# Patient Record
Sex: FEMALE | Race: WHITE | NOT HISPANIC OR LATINO | Employment: FULL TIME | URBAN - METROPOLITAN AREA
[De-identification: names, ages, dates, MRNs, and addresses within clinical notes are randomized per-mention and may not be internally consistent; named-entity substitution may affect disease eponyms.]

---

## 2018-10-09 ENCOUNTER — EVALUATION (OUTPATIENT)
Dept: PHYSICAL THERAPY | Facility: REHABILITATION | Age: 43
End: 2018-10-09
Payer: COMMERCIAL

## 2018-10-09 DIAGNOSIS — M54.12 CERVICAL RADICULOPATHY: Primary | ICD-10-CM

## 2018-10-09 PROCEDURE — 97140 MANUAL THERAPY 1/> REGIONS: CPT | Performed by: PHYSICAL THERAPIST

## 2018-10-09 PROCEDURE — G8985 CARRY GOAL STATUS: HCPCS | Performed by: PHYSICAL THERAPIST

## 2018-10-09 PROCEDURE — 97162 PT EVAL MOD COMPLEX 30 MIN: CPT | Performed by: PHYSICAL THERAPIST

## 2018-10-09 PROCEDURE — G8984 CARRY CURRENT STATUS: HCPCS | Performed by: PHYSICAL THERAPIST

## 2018-10-09 NOTE — LETTER
October 10, 2018    Celestina Trevino MD  [address] [place] [address]    Patient: Mia Barroso   YOB: 1975   Date of Visit: 10/9/2018     Encounter Diagnosis     ICD-10-CM    1  Cervical radiculopathy M54 12        Dear Dr Tam Rush:    Please review the attached Plan of Care from [facility] recent visit  Please verify that you agree therapy should continue by signing the attached document and sending it back to our office  If you have any questions or concerns, please don't hesitate to call  Sincerely,    Leila Main, PT      Referring Provider:      I certify that I have read the below Plan of Care and certify the need for these services furnished under this plan of treatment while under my care  Celestina Trevino MD  [phone]  VIA Facsimile: [phone]          PT Evaluation   Assessment/Plan  Subjective    Today's date: 10/10/2018  Patient name: Mia Barroso  : 1975  MRN: [id number]  Referring provider: Zora Durna MD  Dx:   Encounter Diagnosis   Name Primary? 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  The cart vibrates and was she pushes it  Evaluation findings appear consistent with a nerve impingement but it is not clear if she has a herniated disc or it is related to muscle spasms  She most likely has a double crush process  This would involve a disc, scalenes and some what her pec minor    She has generalized left UE weakness but a specific positive median nerve upper limb tension test   She has weak cervical musculature as well  I will need to address areas above concomitantly to be effective but this will probably cause her some increased discomfort during the treatment process for the first 6 weeks  If she can not tolerated the therapy she may need to get further work up (to confirm HNP) and get more of a team approach with pain management  Objective     Neurological Testing     Sensation   Cervical/Thoracic   Left   Diminished: light touch    Reflexes   Left   Biceps (C5/C6): trace (1+)  Brachioradialis (C6): trace (1+)    Active Range of Motion   Left Shoulder   Flexion: 175 degrees with pain    Passive Range of Motion   Left Shoulder   Flexion: 170 degrees with pain    Strength/Myotome Testing   Cervical Spine     Left   Neck lateral flexion (C3): 4-    Right etr  Neck lateral flexion (C3): 4-    Left Shoulder     Planes of Motion   Flexion: 4   Abduction: 4   External rotation at 0°:  4   Internal rotation at 0°:  4     Isolated Muscles   Lower trapezius: 4+   Middle trapezius: 4   Serratus anterior: 4   Upper trapezius: 4+     Left Elbow   Flexion: 4-  Extension: 4    Left Wrist/Hand   Wrist extension: 4  Wrist flexion: 4-  Ulnar deviation: 4     (2nd hand position)     Trial 1: 28    Right Wrist/Hand      (2nd hand position)     Trial 1: 40    Additional Strength Details  Deep neck flexor strength = 6 seconds (normal = 60 sec)  Very tight scalenes on left  Very tight pec minor bilaterally Left>Right  Very tight suboccipital muscles  Pt  Is right hand dominante    Tests   Cervical     Left   Negative alar ligament integrity  Right   Negative alar ligament integrity  Left Shoulder   Positive ULTT1  Negative crank, Hawkin's and Neer's       Additional Tests Details  (-) VBI    STG:   Pain level 4/10 left UE and neck  Headaches no more than 1x weekly    LTG:  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 Modalities             TENS with MHP?                                          X= performed        Flowsheet Rows      Most Recent Value   PT/OT G-Codes   FOTO information reviewed  No   Assessment Type  Evaluation   G code set  Carrying, Moving & Handling Objects   Carrying, Moving and Handling Objects Current Status ()  CL   Carrying, Moving and Handling Objects Goal Status ()  CHRISTY Ruelas, PT  10/10/2018,7:00 AM

## 2018-10-10 ENCOUNTER — TRANSCRIBE ORDERS (OUTPATIENT)
Dept: PHYSICAL THERAPY | Facility: REHABILITATION | Age: 43
End: 2018-10-10

## 2018-10-10 DIAGNOSIS — M54.12 BRACHIAL NEURITIS: Primary | ICD-10-CM

## 2018-10-10 NOTE — PROGRESS NOTES
PT Evaluation   Assessment/Plan  Subjective    Today's date: 10/10/2018  Patient name: Samira Masters  : 1975  MRN: [id number]  Referring provider: Zully Gutierrez MD  Dx:   Encounter Diagnosis   Name Primary? 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  The cart vibrates and was she pushes it  Evaluation findings appear consistent with a nerve impingement but it is not clear if she has a herniated disc or it is related to muscle spasms  She most likely has a double crush process  This would involve a disc, scalenes and some what her pec minor  She has generalized left UE weakness but a specific positive median nerve upper limb tension test   She has weak cervical musculature as well  I will need to address areas above concomitantly to be effective but this will probably cause her some increased discomfort during the treatment process for the first 6 weeks  If she can not tolerated the therapy she may need to get further work up (to confirm HNP) and get more of a team approach with pain management        Objective     Neurological Testing     Sensation   Cervical/Thoracic   Left   Diminished: light touch    Reflexes   Left   Biceps (C5/C6): trace (1+)  Brachioradialis (C6): trace (1+)    Active Range of Motion   Left Shoulder   Flexion: 175 degrees with pain    Passive Range of Motion   Left Shoulder   Flexion: 170 degrees with pain    Strength/Myotome Testing   Cervical Spine     Left   Neck lateral flexion (C3): 4-    Right etr  Neck lateral flexion (C3): 4-    Left Shoulder     Planes of Motion   Flexion: 4   Abduction: 4   External rotation at 0°: 4   Internal rotation at 0°: 4     Isolated Muscles   Lower trapezius: 4+   Middle trapezius: 4   Serratus anterior: 4   Upper trapezius: 4+     Left Elbow   Flexion: 4-  Extension: 4    Left Wrist/Hand   Wrist extension: 4  Wrist flexion: 4-  Ulnar deviation: 4     (2nd hand position)     Trial 1: 28    Right Wrist/Hand      (2nd hand position)     Trial 1: 40    Additional Strength Details  Deep neck flexor strength = 6 seconds (normal = 60 sec)  Very tight scalenes on left  Very tight pec minor bilaterally Left>Right  Very tight suboccipital muscles  Pt  Is right hand dominante    Tests   Cervical     Left   Negative alar ligament integrity  Right   Negative alar ligament integrity  Left Shoulder   Positive ULTT1  Negative crank, Hawkin's and Neer's  Additional Tests Details  (-) VBI    STG:   Pain level 4/10 left UE and neck  Headaches no more than 1x weekly    LTG:  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 Modalities             TENS with MHP?                                          X= performed        Flowsheet Rows      Most Recent Value   PT/OT G-Codes   FOTO information reviewed  No Assessment Type  Evaluation   G code set  Carrying, Moving & Handling Objects   Carrying, Moving and Handling Objects Current Status ()  CL   Carrying, Moving and Handling Objects Goal Status ()  CHRISTY Bee, PT  10/10/2018,7:00 AM

## 2018-10-16 ENCOUNTER — OFFICE VISIT (OUTPATIENT)
Dept: PHYSICAL THERAPY | Facility: REHABILITATION | Age: 43
End: 2018-10-16
Payer: COMMERCIAL

## 2018-10-16 DIAGNOSIS — M54.12 CERVICAL RADICULOPATHY: Primary | ICD-10-CM

## 2018-10-16 PROCEDURE — 97140 MANUAL THERAPY 1/> REGIONS: CPT | Performed by: PHYSICAL THERAPIST

## 2018-10-16 NOTE — PROGRESS NOTES
Daily Note     Today's date: 10/16/2018  Patient name: Jerad Das  : 1975  MRN: [id number]  Referring provider: Laureano Baldwin MD  Dx:   Encounter Diagnosis     ICD-10-CM    1  Cervical radiculopathy M54 12                   Subjective: Pt reports that the exercises have not been going well for her that she has been highly irritable with the exercises  She didn't work today because of her pain  She is coming into today with a migraine  Objective: See treatment diary below      Assessment: Pt had poor tolerance to light soft tissue work  SO region was hypertonic and hypersensitive  Symptoms were easily created with soft tissue work and migraine was easily brought on  MH helped to decrease some symptoms  TENS was trailed to decrease symptoms  Pt had minimal to no relief post tens  Pt was instructed to complete cervical retraction until NV  Pt would benefit from skilled outpatient PT to address pain, ROM, and posture in order to maximize her level of function  Plan: Continue per plan of care  Progress treatment as tolerated  Precautions:   Severe headaches    Manuals 10/9 10/17           Grade V thoracic - supine x x           Repeated cervical retraction with extension off EOT x            stm C-spine and pec minor             STM SO, UT  x           Exercise Diary                           Seated cervical retraction with extension 10x            Nerve sliders for left             Corner pec stretch 15"x3                         TB ER with chin tuck             DNF head lift             Prone chin tuck with scap retraction             Scalene stretch?                                                                                            Modalities             TENS with MHP?  x20 min                                       X= performed

## 2018-10-16 NOTE — PROGRESS NOTES
Daily Note     Today's date: 10/16/2018  Patient name: Jeff Alvarez  : 1975  MRN: [id number]  Referring provider: Jorge Vasquez MD  Dx:   Encounter Diagnosis     ICD-10-CM    1  Cervical radiculopathy M54 12                   Subjective: Pt reports that       Objective: See treatment diary below      Assessment: Tolerated treatment {Tolerated treatment :6002832108}  Patient {assessment:}      Plan: {PLAN:[id number]}        Precautions:   Severe headaches    Manuals 10/9            Grade V thoracic - supine x            Repeated cervical retraction with extension off EOT x            stm C-spine and pec minor                          Exercise Diary                           Seated cervical retraction with extension 10x            Nerve sliders for left             Corner pec stretch 15"x3                         TB ER with chin tuck             DNF head lift             Prone chin tuck with scap retraction             Scalene stretch? Modalities             TENS with MHP?                                          X= performed

## 2018-10-18 ENCOUNTER — OFFICE VISIT (OUTPATIENT)
Dept: PHYSICAL THERAPY | Facility: REHABILITATION | Age: 43
End: 2018-10-18
Payer: COMMERCIAL

## 2018-10-18 DIAGNOSIS — M54.12 CERVICAL RADICULOPATHY: Primary | ICD-10-CM

## 2018-10-18 PROCEDURE — 97140 MANUAL THERAPY 1/> REGIONS: CPT | Performed by: PHYSICAL THERAPIST

## 2018-10-18 NOTE — PROGRESS NOTES
Daily Note     Today's date: 10/18/2018  Patient name: Nora Faith  : 1975  MRN: [id number]  Referring provider: Ting Elizalde MD  Dx:   Encounter Diagnosis     ICD-10-CM    1  Cervical radiculopathy M54 12                   Subjective: Pt reports that she took medicine today and has not had a headache since 2pm today  Objective: See treatment diary below      Assessment: N/T was additionally recreated with headache symptoms with soft tissue work  Diane Gonzalez, PT, DPT assisted with tx  Due to high irritability with inconsistent recreation of symptoms with no relief or decrease in symptoms it was discussed with patient at this point she should make an appointment with her MD  Pt verbalized understanding and stated "I will make an appointment as soon as possible"  Plan: Pt will follow up with MD        Precautions:   Severe headaches    Manuals 10/9 10/16 10/18          Grade V thoracic - supine x x           Repeated cervical retraction with extension off EOT x            stm C-spine and pec minor             STM SO, UT  x           STM rhomboids   KAB          Scapular mobs   KAB          Exercise Diary                           Seated cervical retraction with extension 10x            Nerve sliders for left             Corner pec stretch 15"x3                         TB ER with chin tuck             DNF head lift             Prone chin tuck with scap retraction             Scalene stretch?                                                                                            Modalities             TENS with MHP?  x20 min                                       X= performed

## 2018-10-23 ENCOUNTER — APPOINTMENT (OUTPATIENT)
Dept: PHYSICAL THERAPY | Facility: REHABILITATION | Age: 43
End: 2018-10-23
Payer: COMMERCIAL

## 2018-10-25 ENCOUNTER — APPOINTMENT (OUTPATIENT)
Dept: PHYSICAL THERAPY | Facility: REHABILITATION | Age: 43
End: 2018-10-25
Payer: COMMERCIAL

## 2018-10-30 ENCOUNTER — APPOINTMENT (OUTPATIENT)
Dept: PHYSICAL THERAPY | Facility: REHABILITATION | Age: 43
End: 2018-10-30
Payer: COMMERCIAL

## 2018-11-05 ENCOUNTER — TELEPHONE (OUTPATIENT)
Dept: NEUROLOGY | Facility: CLINIC | Age: 43
End: 2018-11-05

## 2018-11-19 ENCOUNTER — TRANSCRIBE ORDERS (OUTPATIENT)
Dept: NEUROLOGY | Facility: CLINIC | Age: 43
End: 2018-11-19

## 2018-11-19 DIAGNOSIS — R20.2 PARESTHESIA OF SKIN: ICD-10-CM

## 2018-11-19 DIAGNOSIS — G43.709 CHRONIC MIGRAINE WITHOUT AURA WITHOUT STATUS MIGRAINOSUS, NOT INTRACTABLE: Primary | ICD-10-CM

## 2018-11-21 ENCOUNTER — OFFICE VISIT (OUTPATIENT)
Dept: NEUROLOGY | Facility: CLINIC | Age: 43
End: 2018-11-21
Payer: COMMERCIAL

## 2018-11-21 ENCOUNTER — TELEPHONE (OUTPATIENT)
Dept: NEUROLOGY | Facility: CLINIC | Age: 43
End: 2018-11-21

## 2018-11-21 VITALS
WEIGHT: 164 LBS | HEIGHT: 65 IN | BODY MASS INDEX: 27.32 KG/M2 | HEART RATE: 68 BPM | SYSTOLIC BLOOD PRESSURE: 102 MMHG | DIASTOLIC BLOOD PRESSURE: 88 MMHG

## 2018-11-21 DIAGNOSIS — R53.1 LEFT-SIDED WEAKNESS: ICD-10-CM

## 2018-11-21 DIAGNOSIS — G44.221 CHRONIC TENSION-TYPE HEADACHE, INTRACTABLE: ICD-10-CM

## 2018-11-21 DIAGNOSIS — R20.2 NUMBNESS AND TINGLING OF LEFT UPPER EXTREMITY: ICD-10-CM

## 2018-11-21 DIAGNOSIS — R51.9 CHRONIC DAILY HEADACHE: ICD-10-CM

## 2018-11-21 DIAGNOSIS — R20.0 NUMBNESS AND TINGLING OF LEFT UPPER EXTREMITY: ICD-10-CM

## 2018-11-21 DIAGNOSIS — G43.709 CHRONIC MIGRAINE WITHOUT AURA WITHOUT STATUS MIGRAINOSUS, NOT INTRACTABLE: Primary | ICD-10-CM

## 2018-11-21 DIAGNOSIS — G44.85 STABBING HEADACHE: ICD-10-CM

## 2018-11-21 DIAGNOSIS — R20.2 PARESTHESIA OF SKIN: ICD-10-CM

## 2018-11-21 DIAGNOSIS — E55.9 VITAMIN D DEFICIENCY: ICD-10-CM

## 2018-11-21 PROCEDURE — 99244 OFF/OP CNSLTJ NEW/EST MOD 40: CPT | Performed by: PSYCHIATRY & NEUROLOGY

## 2018-11-21 RX ORDER — SCOPOLAMINE 1 MG/3 DAY
PATCH,TRANSDERMAL 3 DAY TRANSDERMAL
Refills: 2 | COMMUNITY
Start: 2018-08-23 | End: 2020-04-22

## 2018-11-21 RX ORDER — METHYLPREDNISOLONE 4 MG/1
2 TABLET ORAL
Qty: 2 TABLET | Refills: 0 | Status: SHIPPED | OUTPATIENT
Start: 2018-11-21 | End: 2018-11-24

## 2018-11-21 RX ORDER — BIOTIN 10000 MCG
CAPSULE ORAL
COMMUNITY
End: 2019-03-06 | Stop reason: ALTCHOICE

## 2018-11-21 RX ORDER — DIPHENOXYLATE HYDROCHLORIDE AND ATROPINE SULFATE 2.5; .025 MG/1; MG/1
1 TABLET ORAL DAILY
COMMUNITY
End: 2020-04-22

## 2018-11-21 RX ORDER — VENLAFAXINE HYDROCHLORIDE 37.5 MG/1
CAPSULE, EXTENDED RELEASE ORAL
Qty: 60 CAPSULE | Refills: 1 | Status: SHIPPED | OUTPATIENT
Start: 2018-11-21 | End: 2020-04-22

## 2018-11-21 NOTE — PROGRESS NOTES
[address] [facility] Consult  PATIENT:  Solo Meyer  MRN:  [id number]  :  1975  DATE OF SERVICE:  2018  REFERRED BY: Anil Lopes MD  PMD: Terry Gary MD    Assessment/Plan:     Solo Meyer is a 44 y o  female referred here for evaluation of headache  She reports she has had headaches since her teenage years however they have waxed and waned throughout her life  Since the beginning of September for the past 3 months headaches have increased  She now has a chronic daily headache in 2-3 migraines a week  She reports less than 5 times she has had an aura prior to the headache of a Kaleidoscope effect and both eyes for 15-30 minutes  The quality of the headache varies as location and severity  Associated symptoms include nausea, vomiting, photophobia, phonophobia, osmophobia, blurred vision, lightheadedness  Any type of movement makes it worse especially turning her neck  She reports sometimes she will have ptosis of the left or right eye  Occasionally has been told her pupils were dilated and evenly  She has intermittent paresthesias associated with neck pain  She reports left-sided weakness  She has had trouble opening things with her left hand and carrying her laundry with her left arm  She has a history of anxiety and depression and has never been on medication for this  She saw a therapist in the past over 10 years ago and current feels stable however the pain does bring her down and she becomes tearful during the visit about this  She is encouraged to establish care with a therapist     Neurologic assessment reveals left-sided weakness 4+ out of 5  +Lhermitte's sign  She reports she has had 4-5 MRI brains with the most recent 2018 which was unchanged from 2015 for patient, nonspecific white matter changes  She has never had a MRI of the spine  We have discussed headache hygiene in lifestyle factors that could improve her headaches    We discussed preventative supplements that she can start taking including magnesium, riboflavin and melatonin  We also recommended starting venlafaxine 37 5 mg daily for 2 weeks and consider increasing to 75 mg, to treat chronic pain and mood symptoms  Discussed risks/benefits and possible side effects  Recommended routine labs for workup for reversible causes  Due to her neck pain, left-sided weakness, paresthesias, Lhermitte's sign an MRI brain with nonspecific white matter changes suspicious for multiple sclerosis versus due to migraine, we recommended MRI C and T-spine for further evaluation  Diagnoses and all orders for this visit    Vitamin D deficiency  -     Vitamin D 25 hydroxy; Future    Chronic migraine without aura without status migrainosus, not intractable  Chronic daily headache  Chronic tension-type headache, intractable  Left-sided weakness  Numbness and tingling of left upper extremity         Additional Testing:   Neurodiagnostic workup:  -   MRI cervical spine w wo contrast; Future  -     MRI thoracic spine with and without contrast; Future  -     Vitamin B12; Future  -     Vitamin D 25 hydroxy; Future  -     TSH, 3rd generation; Future  -     Comprehensive metabolic panel; Future      Headache/migraine treatment:   Abortive medications (for immediate treatment of a headache): It is ok to take ibuprofen, acetaminophen or naproxen (Advil, Tylenol,  Aleve, Excedrin) if they help your headaches you should limit these to No more than 3 times a week to avoid medication overuse/rebound headaches  Do not take Aleve or ibuprofen the next three days  (steroid)  -     methylprednisolone (MEDROL) 4 mg tablet;  Take 0 5 tablets (2 mg total) by mouth daily with breakfast for 3 days      Over the counter preventive supplements for headaches/migraines   (to take every day to help prevent headaches - not to take at the time of headache):  [x] Magnesium 500mg daily   [x] Riboflavin (Vitamin B2)  400mg daily (adults)   (FYI B2 may make your urine bright/neon yellow)    Prescription preventive medications for headaches/migraines   (to take every day to help prevent headaches - not to take at the time of headache):  Do not start this until after steroids   [x] - venlafaxine (EFFEXOR-XR) 37 5 mg 24 hr capsule; 1 tab PO daily for 2 weeks, if tolerated increase to 2 tabs PO daily    Sleep:   [x] Melatonin - you may take 3 mg nightly for sleep  You should take this 1 hour prior to bedtime consistently every night for it to work  It works by gradually helping to adjust your sleep time over days to weeks, rather than immediately making you feel sleepy  Lifestyle Recommendations:  [x] SLEEP - Maintain a regular sleep schedule: Adults need at least 7-8 hours of uninterrupted a night  Maintain good sleep hygiene:  Going to bed and waking up at consistent times, avoiding excessive daytime naps, avoiding caffeinated beverages in the evening, avoid excessive stimulation in the evening and generally using bed primarily for sleeping  One hour before bedtime would recommend turning lights down lower, decreasing your activity (may read quietly, listen to music at a low volume)  When you get into bed, should eliminate all technology (no texting, emailing, playing with your phone, iPad or tablet in bed)  [x] HYDRATION - Maintain good hydration  Drink  2L of fluid a day (4 typical small water bottles)  [x] DIET - Maintain good nutrition  In particular don't skip meals and try and eat healthy balanced meals regularly  [x] TRIGGERS - Look for other triggers and avoid them: Limit caffeine to 1-2 cups a day or less  Avoid dietary triggers that you have noticed bring on your headaches (this could include aged cheese, peanuts, MSG, aspartame and nitrates)  Education and Follow-up  [x] Please call with any questions or concerns  [x] Return in about 2 weeks (around 12/5/2018)         CC:   We had the pleasure of evaluating Carmen Bella Kirsten in neurological consultation today  she is a 44 y o    right handed female who presents today for evaluation of headaches  History of Present Illness:   What is your current pain level - 4    Headaches started at what age? 13/13 years old would have them a couple of times a year  How often do the headaches occur? There has been ups and downs with them in her life  2013 they increased to 1/month  Back in 2015 was having a migraine 1-2 a week and chronic daily headache, and pins and needles and numbness   Months later she stopped working and these symptoms disapated and migraine 2-3 times a month  Then they went away to a couple of times a year  Increase since the beginning of September, while she was working she was getting pins and needles in left arm and left leg  And the past couple of months has missed over 30 days of work since they are so bad  Now chronic daily headaches and 2-3 migraines a week   What time of the day do the headaches start? no particular time of day - always wakes up with headache but migraines anytime  How long do the headaches last? Migraines last from 2-4 days    Are you ever headache free? No     Aura? with aura - less than 5 times total has happened  Kaleidoscope effect in both eyes   How long does it last? A few minutes 15-30 mins    Describe your usual headache pain quality? It really varies   Throbbing, pulsating, achy, pressure, tight band, dull, shooting, electrical, stabbing, pain that moves around  Where is your headache located? It moves and can be anywhere on head, the stabbing pain can be the size of a fist and move around the head   Does the pain Radiate? no radiation of pain  What is the intensity of pain?  Usually 8-10/10  Associated symptoms:   [x] Nausea       [x] Vomiting        [] Diarrhea         [x] Insomnia           [x] Stiff or sore neck - neck stiff throbbing and shooting pain         [x] Problems with concentration  [x] Photophobia     [x]Phonophobia [x] Osmophobia  [x] Blurred vision  - both eyes, never total vision loss in one eye or both  [x] Prefer quiet, dark room        [x] Light-headed or dizzy - even when turning head to fast   [] Tinnitus     [] Red ear      [x] Ptosis - R or L - maybe L side more     [] Facial droop  [] Lacrimation  [] Nasal congestion/rhinorrhea   [] Flushing of face         [x] Change in pupil size - has been told on occasion that pupils are dilated differently   [x] Hands or feet tingle or feel numb/paresthesias    - entire neck pain, achy, shooting pain like a line down the back and only tingling and numbness on the left side - lasts 10-15 minutes  - usually not in left leg, but can have it happen if she sleeps on that side, or if she is at work and walking and it will happen, over 10-15 creeping down leg   - can start near neck and go all the way down left arm or start in left and go all the way up  - also can happen pins and needles in chin  - hard to say if only happens with really bad migraines but usually is   -     Number of days missed per month because of headaches:  Work days: 34    Things that make the headache worse? Movement, everything - when has migraines, turning neck     Headache triggers: if had pain at night due to poor sleep,   Kept a journal in the past and could not find anything consistent      What time of the year do headaches occur more frequently?   do not seem to be related to any time of the year    Have you seen someone else for headaches or pain? Yes, Dr Alisia Nickerson     Have you had trigger point injection performed and how often? No  Have you had Botox injection performed and how often? No   Have you had epidural injections or transforaminal injections performed? No  Have you used CBD or THC for your headaches and how often? No  Are you current pregnant or planning on getting pregnant? No, tubes tied   Have you ever had any Brain imaging?    MRI Brains x 4-5 - 11/2018 unchanged from 2015 per patient  MRIs of the spine have been denied    What medications do you take or have you taken for your headaches? ABORTIVE:    fioricet/Flornal - stopped taking in 2015  Excedrin - takes the edge off, does not take it away - hurts your stomach  - if has a migraine takes 2 at time daily   - 0-3  Aleve for neck - does not seem to help - takes 2 BID - since September     PREVENTIVE:   One drug - amitriptyline - gained a lot of weight on it    Alternative therapies used in the past for headaches? physical therapy made it worse     Neck pain?: Yes  When did the neck pain start? September 2018  Does your neck pain cause you to have headaches? yes  Is your neck pain associated with? dizziness, , balance problem, numbness or weakness in your arm or leg - left side weaker - trouble carrying laundry and opening bottles   What aggravates neck pain? Everything, lifting, computer work, using phone, chin to chest, looking at ceiling, turning head to left or right  What alleviates neck pain?  hot pack for a little    pain shoots up spine when chin forward     LIFESTYLE  Sleep - "too much" - trouble staying asleep, wakes up with pain in neck or head, in bed 9 hours    Physical activity: walk at work    Water: 3 bottles   Diet:  Do you ever skip meals? no    Mood: anxiety and depression, comes and goes   - never been on a medication  - talked to a therapist many years ago, over 10 years ago   - can feel when it is getting worse, right now stable   - this pain brings her down    The following portions of the patient's history were reviewed and updated as appropriate: allergies, current medications, past family history, past medical history, past social history, past surgical history and problem list     Past Medical History:     Past Medical History:   Diagnosis Date    Migraine        There is no problem list on file for this patient  Medications:      No current outpatient prescriptions on file       No current facility-administered medications for this visit  Allergies:    No Known Allergies    Family History:   Family history of headaches:  migraine headaches in mother, sister and grandmother both sisters  Any family history of aneurysms - No      Social History:   Work:   Education: 12  Lives with daughter Sahara she is 15  2 kids total    Illicit Drugs: denies  Alcohol/tobacco: Denies tobacco use, alcohol intake: social drinker      Objective:   Physical Exam:                                                                 Vitals:            Constitutional:    /88 (BP Location: Right arm, Patient Position: Sitting, Cuff Size: Large)   Pulse 68   Ht 5' 5" (1 651 m)   Wt 74 4 kg (164 lb)   BMI 27 29 kg/m²   BP Readings from Last 3 Encounters:   11/21/18 102/88   04/06/15 118/74   03/12/15 102/68     Pulse Readings from Last 3 Encounters:   11/21/18 68   04/06/15 100   03/12/15 83         Well developed, well nourished, well groomed  No dysmorphic features  HEENT:  Normocephalic atraumatic  No meningismus  Oropharynx is clear and moist  No oral mucosal lesions  Chest:  Respirations regular and unlabored  Cardiovascular:  Regular rate, intact distal pulses  Distal extremities warm without palpable edema or tenderness, no observed significant swelling  Musculoskeletal:  Full range of motion  (see below under neurologic exam for evaluation of motor function and gait)   Skin:  warm and dry, not diaphoretic  No apparent birthmarks or stigmata of neurocutaneous disease  Psychiatric:  Normal behavior and appropriate affect, tearful        Neurological Examination:     Mental status/cognitive function:   Orientated to time, place and person  Recent and remote memory intact  Attention span and concentration as well as fund of knowledge are appropriate for age  Normal language and spontaneous speech  Cranial Nerves:  II-visual fields full     Fundi appear normal bilaterally  III, IV, VI-Pupils were equal, round, and reactive to light and accomodation  Extraocular movements were full and conjugate without nystagmus  conjugate gaze, normal smooth pursuits, normal saccades   V-facial sensation symmetric  VII-facial expression symmetric, intact forehead wrinkle, strong eye closure, symmetric smile    VIII-hearing grossly intact bilaterally   IX, X-palate elevation symmetric, no dysarthria  XI-shoulder shrug strength intact    XII-tongue protrusion midline  +Lhermitte's sign     Motor Exam: symmetric bulk and tone throughout, no pronator drift  Power/strength 5/5 Right upper and lower extremities, no atrophy, fasciculations or abnormal movements noted  Left UE deltoid, biceps, triceps,  4+/5   Sensory: grossly intact light touch in all extremities  Reflexes: brachioradialis 2+, biceps 2+, knee 2+, ankle 2+ bilaterally  No ankle clonus, toes downgoing   Coordination: Finger nose finger intact bilaterally, no apparent dysmetria, ataxia or tremor noted  Gait: steady casual and tandem gait  Romberg Negative  Pertinent lab results:   3/17/15 are the last labs in the system   Vit D 17 7   TSH 1 6    [id number] MRI brain with and without contrast  Personally reviewed  Nonspecific white matter T2 hyperintensities noted which do not enhance with contrast range in size from 3 mm to 6 mm       Review of Systems:   ROS Personally reviewed  Review of Systems   Constitutional: Positive for chills and fever  HENT: Negative  Eyes: Positive for visual disturbance (blurr)  Respiratory: Negative  Cardiovascular: Negative  Gastrointestinal: Positive for nausea  Genitourinary: Negative  Musculoskeletal: Positive for back pain, neck pain and neck stiffness  Skin: Negative  Allergic/Immunologic: Negative  Neurological: Positive for dizziness, light-headedness and headaches  Hematological: Negative      Psychiatric/Behavioral: Positive for decreased concentration  I have spent 60 minutes with Patient  today in which greater than 50% of this time was spent in counseling/coordination of care regarding Diagnostic results, Prognosis, Risks and benefits of tx options, Intructions for management, Importance of tx compliance, Risk factor reductions and Impressions        Author:  Zachariah Mclean MD 11/21/2018 9:51 AM

## 2018-11-21 NOTE — LETTER
2018     Jeffery Gaming MD  [address] [place] [address]    Patient: Michael Alford   YOB: 1975   Date of Visit: 2018       Dear Dr Katy Campoverde:    Thank you for referring Michael Alford to me for evaluation  Below are my notes for this consultation  If you have questions, please do not hesitate to call me  I look forward to following your patient along with you  Sincerely,        Griffin Leigh MD        CC: No Recipients  Griffin Leigh MD  2018  7:32 PM  Sign at close encounter  [facility] Consult  PATIENT:  Michael Alford  MRN:  [id number]  :  1975  DATE OF SERVICE:  2018  REFERRED BY: Vinh Milan MD  PMD: Jeffery Gaming MD    Assessment/Plan:     Michael Alford is a 44 y o  female referred here for evaluation of headache  She reports she has had headaches since her teenage years however they have waxed and waned throughout her life  Since the beginning of September for the past 3 months headaches have increased  She now has a chronic daily headache in 2-3 migraines a week  She reports less than 5 times she has had an aura prior to the headache of a Kaleidoscope effect and both eyes for 15-30 minutes  The quality of the headache varies as location and severity  Associated symptoms include nausea, vomiting, photophobia, phonophobia, osmophobia, blurred vision, lightheadedness  Any type of movement makes it worse especially turning her neck  She reports sometimes she will have ptosis of the left or right eye  Occasionally has been told her pupils were dilated and evenly  She has intermittent paresthesias associated with neck pain  She reports left-sided weakness  She has had trouble opening things with her left hand and carrying her laundry with her left arm  She has a history of anxiety and depression and has never been on medication for this    She saw a therapist in the past over 10 years ago and current feels stable however the pain does bring her down and she becomes tearful during the visit about this  She is encouraged to establish care with a therapist     Neurologic assessment reveals left-sided weakness 4+ out of 5  +Lhermitte's sign  She reports she has had 4-5 MRI brains with the most recent 11/2018 which was unchanged from 2015 for patient, nonspecific white matter changes  She has never had a MRI of the spine  We have discussed headache hygiene in lifestyle factors that could improve her headaches  We discussed preventative supplements that she can start taking including magnesium, riboflavin and melatonin  We also recommended starting venlafaxine 37 5 mg daily for 2 weeks and consider increasing to 75 mg, to treat chronic pain and mood symptoms  Discussed risks/benefits and possible side effects  Recommended routine labs for workup for reversible causes  Due to her neck pain, left-sided weakness, paresthesias, Lhermitte's sign an MRI brain with nonspecific white matter changes suspicious for multiple sclerosis versus due to migraine, we recommended MRI C and T-spine for further evaluation  Diagnoses and all orders for this visit    Vitamin D deficiency  -     Vitamin D 25 hydroxy; Future    Chronic migraine without aura without status migrainosus, not intractable  Chronic daily headache  Chronic tension-type headache, intractable  Left-sided weakness  Numbness and tingling of left upper extremity         Additional Testing:   Neurodiagnostic workup:  -   MRI cervical spine w wo contrast; Future  -     MRI thoracic spine with and without contrast; Future  -     Vitamin B12; Future  -     Vitamin D 25 hydroxy; Future  -     TSH, 3rd generation; Future  -     Comprehensive metabolic panel; Future      Headache/migraine treatment:   Abortive medications (for immediate treatment of a headache):    It is ok to take ibuprofen, acetaminophen or naproxen (Advil, Tylenol, Aleve, Excedrin) if they help your headaches you should limit these to No more than 3 times a week to avoid medication overuse/rebound headaches  Do not take Aleve or ibuprofen the next three days  (steroid)  -     methylprednisolone (MEDROL) 4 mg tablet; Take 0 5 tablets (2 mg total) by mouth daily with breakfast for 3 days      Over the counter preventive supplements for headaches/migraines   (to take every day to help prevent headaches - not to take at the time of headache):  [x] Magnesium 500mg daily   [x] Riboflavin (Vitamin B2)  400mg daily (adults)   (FYI B2 may make your urine bright/neon yellow)    Prescription preventive medications for headaches/migraines   (to take every day to help prevent headaches - not to take at the time of headache):  Do not start this until after steroids   [x] - venlafaxine (EFFEXOR-XR) 37 5 mg 24 hr capsule; 1 tab PO daily for 2 weeks, if tolerated increase to 2 tabs PO daily    Sleep:   [x] Melatonin - you may take 3 mg nightly for sleep  You should take this 1 hour prior to bedtime consistently every night for it to work  It works by gradually helping to adjust your sleep time over days to weeks, rather than immediately making you feel sleepy  Lifestyle Recommendations:  [x] SLEEP - Maintain a regular sleep schedule: Adults need at least 7-8 hours of uninterrupted a night  Maintain good sleep hygiene:  Going to bed and waking up at consistent times, avoiding excessive daytime naps, avoiding caffeinated beverages in the evening, avoid excessive stimulation in the evening and generally using bed primarily for sleeping  One hour before bedtime would recommend turning lights down lower, decreasing your activity (may read quietly, listen to music at a low volume)  When you get into bed, should eliminate all technology (no texting, emailing, playing with your phone, iPad or tablet in bed)  [x] HYDRATION - Maintain good hydration    Drink  2L of fluid a day (4 typical small water bottles)  [x] DIET - Maintain good nutrition  In particular don't skip meals and try and eat healthy balanced meals regularly  [x] TRIGGERS - Look for other triggers and avoid them: Limit caffeine to 1-2 cups a day or less  Avoid dietary triggers that you have noticed bring on your headaches (this could include aged cheese, peanuts, MSG, aspartame and nitrates)  Education and Follow-up  [x] Please call with any questions or concerns  [x] Return in about 2 weeks (around 12/5/2018)  CC:   We had the pleasure of evaluating Dayton Russell in neurological consultation today  she is a 44 y o    right handed female who presents today for evaluation of headaches  History of Present Illness:   What is your current pain level - 4    Headaches started at what age? 13/13 years old would have them a couple of times a year  How often do the headaches occur? There has been ups and downs with them in her life  2013 they increased to 1/month  Back in 2015 was having a migraine 1-2 a week and chronic daily headache, and pins and needles and numbness   Months later she stopped working and these symptoms disapated and migraine 2-3 times a month  Then they went away to a couple of times a year  Increase since the beginning of September, while she was working she was getting pins and needles in left arm and left leg  And the past couple of months has missed over 30 days of work since they are so bad  Now chronic daily headaches and 2-3 migraines a week   What time of the day do the headaches start? no particular time of day - always wakes up with headache but migraines anytime  How long do the headaches last? Migraines last from 2-4 days    Are you ever headache free? No     Aura? with aura - less than 5 times total has happened  Kaleidoscope effect in both eyes   How long does it last? A few minutes 15-30 mins    Describe your usual headache pain quality?  It really varies   Throbbing, pulsating, achy, pressure, tight band, dull, shooting, electrical, stabbing, pain that moves around  Where is your headache located? It moves and can be anywhere on head, the stabbing pain can be the size of a fist and move around the head   Does the pain Radiate? no radiation of pain  What is the intensity of pain? Usually 8-10/10  Associated symptoms:   [x] Nausea       [x] Vomiting        [] Diarrhea         [x] Insomnia           [x] Stiff or sore neck - neck stiff throbbing and shooting pain         [x] Problems with concentration  [x] Photophobia     [x]Phonophobia      [x] Osmophobia  [x] Blurred vision  - both eyes, never total vision loss in one eye or both  [x] Prefer quiet, dark room        [x] Light-headed or dizzy - even when turning head to fast   [] Tinnitus     [] Red ear      [x] Ptosis - R or L - maybe L side more     [] Facial droop  [] Lacrimation  [] Nasal congestion/rhinorrhea   [] Flushing of face         [x] Change in pupil size - has been told on occasion that pupils are dilated differently   [x] Hands or feet tingle or feel numb/paresthesias    - entire neck pain, achy, shooting pain like a line down the back and only tingling and numbness on the left side - lasts 10-15 minutes  - usually not in left leg, but can have it happen if she sleeps on that side, or if she is at work and walking and it will happen, over 10-15 creeping down leg   - can start near neck and go all the way down left arm or start in left and go all the way up  - also can happen pins and needles in chin  - hard to say if only happens with really bad migraines but usually is   -     Number of days missed per month because of headaches:  Work days: 34    Things that make the headache worse?  Movement, everything - when has migraines, turning neck     Headache triggers: if had pain at night due to poor sleep,   Kept a journal in the past and could not find anything consistent      What time of the year do headaches occur more frequently?   do not seem to be related to any time of the year    Have you seen someone else for headaches or pain? Yes, Dr Cy June     Have you had trigger point injection performed and how often? No  Have you had Botox injection performed and how often? No   Have you had epidural injections or transforaminal injections performed? No  Have you used CBD or THC for your headaches and how often? No  Are you current pregnant or planning on getting pregnant? No, tubes tied   Have you ever had any Brain imaging? MRI Brains x 4-5 - 11/2018 unchanged from 2015 per patient  MRIs of the spine have been denied    What medications do you take or have you taken for your headaches? ABORTIVE:    fioricet/Flornal - stopped taking in 2015  Excedrin - takes the edge off, does not take it away - hurts your stomach  - if has a migraine takes 2 at time daily   - 0-3  Aleve for neck - does not seem to help - takes 2 BID - since September     PREVENTIVE:   One drug - amitriptyline - gained a lot of weight on it    Alternative therapies used in the past for headaches? physical therapy made it worse     Neck pain?: Yes  When did the neck pain start? September 2018  Does your neck pain cause you to have headaches? yes  Is your neck pain associated with? dizziness, , balance problem, numbness or weakness in your arm or leg - left side weaker - trouble carrying laundry and opening bottles   What aggravates neck pain? Everything, lifting, computer work, using phone, chin to chest, looking at ceiling, turning head to left or right  What alleviates neck pain?  hot pack for a little    pain shoots up spine when chin forward     LIFESTYLE  Sleep - "too much" - trouble staying asleep, wakes up with pain in neck or head, in bed 9 hours    Physical activity: walk at work    Water: 3 bottles   Diet:  Do you ever skip meals?  no    Mood: anxiety and depression, comes and goes   - never been on a medication  - talked to a therapist many years ago, over 10 years ago   - can feel when it is getting worse, right now stable   - this pain brings her down    The following portions of the patient's history were reviewed and updated as appropriate: allergies, current medications, past family history, past medical history, past social history, past surgical history and problem list     Past Medical History:     Past Medical History:   Diagnosis Date    Migraine        There is no problem list on file for this patient  Medications:      No current outpatient prescriptions on file  No current facility-administered medications for this visit  Allergies:    No Known Allergies    Family History:   Family history of headaches:  migraine headaches in mother, sister and grandmother both sisters  Any family history of aneurysms  No      Social History:   Work:   Education: 12  Lives with daughter Sahara she is 15  2 kids total    Illicit Drugs: denies  Alcohol/tobacco: Denies tobacco use, alcohol intake: social drinker      Objective:   Physical Exam:                                                                 Vitals:            Constitutional:    /88 (BP Location: Right arm, Patient Position: Sitting, Cuff Size: Large)   Pulse 68   Ht 5' 5" (1 651 m)   Wt 74 4 kg (164 lb)   BMI 27 29 kg/m²    BP Readings from Last 3 Encounters:   11/21/18 102/88   04/06/15 118/74   03/12/15 102/68     Pulse Readings from Last 3 Encounters:   11/21/18 68   04/06/15 100   03/12/15 83         Well developed, well nourished, well groomed  No dysmorphic features  HEENT:  Normocephalic atraumatic  No meningismus  Oropharynx is clear and moist  No oral mucosal lesions  Chest:  Respirations regular and unlabored  Cardiovascular:  Regular rate, intact distal pulses  Distal extremities warm without palpable edema or tenderness, no observed significant swelling  Musculoskeletal:  Full range of motion   (see below under neurologic exam for evaluation of motor function and gait)   Skin:  warm and dry, not diaphoretic  No apparent birthmarks or stigmata of neurocutaneous disease  Psychiatric:  Normal behavior and appropriate affect, tearful        Neurological Examination:     Mental status/cognitive function:   Orientated to time, place and person  Recent and remote memory intact  Attention span and concentration as well as fund of knowledge are appropriate for age  Normal language and spontaneous speech  Cranial Nerves:  II-visual fields full  Fundi appear normal bilaterally  III, IV, VI-Pupils were equal, round, and reactive to light and accomodation  Extraocular movements were full and conjugate without nystagmus  conjugate gaze, normal smooth pursuits, normal saccades   V-facial sensation symmetric  VII-facial expression symmetric, intact forehead wrinkle, strong eye closure, symmetric smile    VIII-hearing grossly intact bilaterally   IX, X-palate elevation symmetric, no dysarthria  XI-shoulder shrug strength intact    XII-tongue protrusion midline  +Lhermitte's sign     Motor Exam: symmetric bulk and tone throughout, no pronator drift  Power/strength 5/5 Right upper and lower extremities, no atrophy, fasciculations or abnormal movements noted  Left UE deltoid, biceps, triceps,  4+/5   Sensory: grossly intact light touch in all extremities  Reflexes: brachioradialis 2+, biceps 2+, knee 2+, ankle 2+ bilaterally  No ankle clonus, toes downgoing   Coordination: Finger nose finger intact bilaterally, no apparent dysmetria, ataxia or tremor noted  Gait: steady casual and tandem gait  Romberg Negative  Pertinent lab results:   3/17/15 are the last labs in the system   Vit D 17 7   TSH 1 6    Imagin2018 MRI brain with and without contrast  Personally reviewed  Nonspecific white matter T2 hyperintensities noted which do not enhance with contrast range in size from 3 mm to 6 mm       Review of Systems:   ROS Personally reviewed  Review of Systems   Constitutional: Positive for chills and fever  HENT: Negative  Eyes: Positive for visual disturbance (blurr)  Respiratory: Negative  Cardiovascular: Negative  Gastrointestinal: Positive for nausea  Genitourinary: Negative  Musculoskeletal: Positive for back pain, neck pain and neck stiffness  Skin: Negative  Allergic/Immunologic: Negative  Neurological: Positive for dizziness, light-headedness and headaches  Hematological: Negative  Psychiatric/Behavioral: Positive for decreased concentration  I have spent 60 minutes with Patient  today in which greater than 50% of this time was spent in counseling/coordination of care regarding Diagnostic results, Prognosis, Risks and benefits of tx options, Intructions for management, Importance of tx compliance, Risk factor reductions and Impressions        Author:  Da Roberson MD 11/21/2018 9:51 AM

## 2018-11-21 NOTE — PROGRESS NOTES
Patient ID: Sam Berman is a 44 y o  female  Assessment/Plan:    No problem-specific Assessment & Plan notes found for this encounter  {Assess/PlanSmartLinks:58298}       Subjective:    HPI    {[facility] HPI texts:39471}    {Common ambulatory SmartLinks:35650}         Objective:    Blood pressure 102/88, pulse 68, height 5' 5" (1 651 m), weight 74 4 kg (164 lb)  Physical Exam    Neurological Exam      ROS:    Review of Systems   Constitutional: Positive for chills and fever  HENT: Negative  Eyes: Positive for visual disturbance (blurr)  Respiratory: Negative  Cardiovascular: Negative  Gastrointestinal: Positive for nausea  Genitourinary: Negative  Musculoskeletal: Positive for back pain, neck pain and neck stiffness  Skin: Negative  Allergic/Immunologic: Negative  Neurological: Positive for dizziness, light-headedness and headaches  Hematological: Negative  Psychiatric/Behavioral: Positive for decreased concentration

## 2018-11-21 NOTE — PATIENT INSTRUCTIONS
Vitamin D deficiency  -     Vitamin D 25 hydroxy; Future    Chronic migraine without aura without status migrainosus, not intractable  Chronic daily headache  Chronic tension-type headache, intractable  Left-sided weakness  Numbness and tingling of left upper extremity         Additional Testing:   Neurodiagnostic workup:  -   MRI cervical spine w wo contrast; Future  -     MRI thoracic spine with and without contrast; Future  -     Vitamin B12; Future  -     Vitamin D 25 hydroxy; Future  -     TSH, 3rd generation; Future  -     Comprehensive metabolic panel; Future      Headache/migraine treatment:   Abortive medications (for immediate treatment of a headache): It is ok to take ibuprofen, acetaminophen or naproxen (Advil, Tylenol,  Aleve, Excedrin) if they help your headaches you should limit these to No more than 3 times a week to avoid medication overuse/rebound headaches  Do not take Aleve or ibuprofen the next three days  (steroid)  -     methylprednisolone (MEDROL) 4 mg tablet; Take 0 5 tablets (2 mg total) by mouth daily with breakfast for 3 days      Over the counter preventive supplements for headaches/migraines   (to take every day to help prevent headaches - not to take at the time of headache):  [x] Magnesium 500mg daily   [x] Riboflavin (Vitamin B2)  400mg daily (adults)   (FYI B2 may make your urine bright/neon yellow)    Prescription preventive medications for headaches/migraines   (to take every day to help prevent headaches - not to take at the time of headache):  Do not start this until after steroids   [x] - venlafaxine (EFFEXOR-XR) 37 5 mg 24 hr capsule; 1 tab PO daily for 2 weeks, if tolerated increase to 2 tabs PO daily    Sleep:   [x] Melatonin - you may take 3 mg nightly for sleep  You should take this 1 hour prior to bedtime consistently every night for it to work   It works by gradually helping to adjust your sleep time over days to weeks, rather than immediately making you feel sleepy  Lifestyle Recommendations:  [x] SLEEP - Maintain a regular sleep schedule: Adults need at least 7-8 hours of uninterrupted a night  Maintain good sleep hygiene:  Going to bed and waking up at consistent times, avoiding excessive daytime naps, avoiding caffeinated beverages in the evening, avoid excessive stimulation in the evening and generally using bed primarily for sleeping  One hour before bedtime would recommend turning lights down lower, decreasing your activity (may read quietly, listen to music at a low volume)  When you get into bed, should eliminate all technology (no texting, emailing, playing with your phone, iPad or tablet in bed)  [x] HYDRATION - Maintain good hydration  Drink  2L of fluid a day (4 typical small water bottles)  [x] DIET - Maintain good nutrition  In particular don't skip meals and try and eat healthy balanced meals regularly  [x] TRIGGERS - Look for other triggers and avoid them: Limit caffeine to 1-2 cups a day or less  Avoid dietary triggers that you have noticed bring on your headaches (this could include aged cheese, peanuts, MSG, aspartame and nitrates)  Education and Follow-up  [x] Please call with any questions or concerns  Vitamin D deficiency  -     Vitamin D 25 hydroxy; Future    Chronic migraine without aura without status migrainosus, not intractable  Chronic daily headache  Chronic tension-type headache, intractable  Left-sided weakness  Numbness and tingling of left upper extremity         Additional Testing:   Neurodiagnostic workup:  -   MRI cervical spine w wo contrast; Future  -     MRI thoracic spine with and without contrast; Future  -     Vitamin B12; Future  -     Vitamin D 25 hydroxy; Future  -     TSH, 3rd generation; Future  -     Comprehensive metabolic panel; Future      Headache/migraine treatment:   Abortive medications (for immediate treatment of a headache):    It is ok to take ibuprofen, acetaminophen or naproxen (Advil, Tylenol,  Aleve, Excedrin) if they help your headaches you should limit these to No more than 3 times a week to avoid medication overuse/rebound headaches  Do not take Aleve or ibuprofen the next three days  (steroid)  -     methylprednisolone (MEDROL) 4 mg tablet; Take 0 5 tablets (2 mg total) by mouth daily with breakfast for 3 days      Over the counter preventive supplements for headaches/migraines   (to take every day to help prevent headaches - not to take at the time of headache):  [x] Magnesium 500mg daily   [x] Riboflavin (Vitamin B2)  400mg daily (adults)   (FYI B2 may make your urine bright/neon yellow)    Prescription preventive medications for headaches/migraines   (to take every day to help prevent headaches - not to take at the time of headache):  Do not start this until after steroids   [x] - venlafaxine (EFFEXOR-XR) 37 5 mg 24 hr capsule; 1 tab PO daily for 2 weeks, if tolerated increase to 2 tabs PO daily    Sleep:   [x] Melatonin - you may take 3 mg nightly for sleep  You should take this 1 hour prior to bedtime consistently every night for it to work  It works by gradually helping to adjust your sleep time over days to weeks, rather than immediately making you feel sleepy  Lifestyle Recommendations:  [x] SLEEP - Maintain a regular sleep schedule: Adults need at least 7-8 hours of uninterrupted a night  Maintain good sleep hygiene:  Going to bed and waking up at consistent times, avoiding excessive daytime naps, avoiding caffeinated beverages in the evening, avoid excessive stimulation in the evening and generally using bed primarily for sleeping  One hour before bedtime would recommend turning lights down lower, decreasing your activity (may read quietly, listen to music at a low volume)  When you get into bed, should eliminate all technology (no texting, emailing, playing with your phone, iPad or tablet in bed)  [x] HYDRATION - Maintain good hydration    Drink  2L of fluid a day (4 typical small water bottles)  [x] DIET - Maintain good nutrition  In particular don't skip meals and try and eat healthy balanced meals regularly  [x] TRIGGERS - Look for other triggers and avoid them: Limit caffeine to 1-2 cups a day or less  Avoid dietary triggers that you have noticed bring on your headaches (this could include aged cheese, peanuts, MSG, aspartame and nitrates)  Education and Follow-up  [x] Please call with any questions or concerns     [x] 2-3 weeks

## 2018-11-21 NOTE — TELEPHONE ENCOUNTER
Writer looked into patients insurance  Printed lists of LCSW and LPC for patient  Called patient to discuss  She will review options once received in the mail and call to schedule with therapist of her choice  Writer placed card in mail in case patient has any follow up questions once she receives the mail

## 2018-11-21 NOTE — TELEPHONE ENCOUNTER
MD Christine Monsivais             Could you please help this ok lady find a therapist that her insurance covers? She is eager to find someone

## 2018-11-30 ENCOUNTER — TELEPHONE (OUTPATIENT)
Dept: NEUROLOGY | Facility: CLINIC | Age: 43
End: 2018-11-30

## 2018-11-30 NOTE — TELEPHONE ENCOUNTER
Maura with [facility] states she is assisting the patient with her imaging  She states the patient is authorized to get her MRIs at [address] (patient has had them done here in the past) and wanted to confirm that Dr Suellen Dyer is okay with patient getting the imaging there  If agreeable, we can fax them to [phone]    (no need to call her back)

## 2018-11-30 NOTE — TELEPHONE ENCOUNTER
Yes as long as the CD of the images and the official report are uploaded in our system at least a day prior to her next visit, as I can not otherwise look at CDs that are brought to clinic

## 2018-12-07 ENCOUNTER — TRANSCRIBE ORDERS (OUTPATIENT)
Dept: LAB | Facility: OTHER | Age: 43
End: 2018-12-07

## 2018-12-07 ENCOUNTER — APPOINTMENT (OUTPATIENT)
Dept: LAB | Facility: OTHER | Age: 43
End: 2018-12-07
Payer: COMMERCIAL

## 2018-12-07 DIAGNOSIS — E55.9 VITAMIN D DEFICIENCY: ICD-10-CM

## 2018-12-07 DIAGNOSIS — G43.709 CHRONIC MIGRAINE WITHOUT AURA WITHOUT STATUS MIGRAINOSUS, NOT INTRACTABLE: ICD-10-CM

## 2018-12-07 LAB
25(OH)D3 SERPL-MCNC: 19.7 NG/ML (ref 30–100)
ALBUMIN SERPL BCP-MCNC: 3.8 G/DL (ref 3.5–5)
ALP SERPL-CCNC: 56 U/L (ref 46–116)
ALT SERPL W P-5'-P-CCNC: 16 U/L (ref 12–78)
ANION GAP SERPL CALCULATED.3IONS-SCNC: 4 MMOL/L (ref 4–13)
AST SERPL W P-5'-P-CCNC: 13 U/L (ref 5–45)
BILIRUB SERPL-MCNC: 0.67 MG/DL (ref 0.2–1)
BUN SERPL-MCNC: 12 MG/DL (ref 5–25)
CALCIUM SERPL-MCNC: 9.7 MG/DL (ref 8.3–10.1)
CHLORIDE SERPL-SCNC: 105 MMOL/L (ref 100–108)
CO2 SERPL-SCNC: 27 MMOL/L (ref 21–32)
CREAT SERPL-MCNC: 0.9 MG/DL (ref 0.6–1.3)
GFR SERPL CREATININE-BSD FRML MDRD: 79 ML/MIN/1.73SQ M
GLUCOSE P FAST SERPL-MCNC: 78 MG/DL (ref 65–99)
POTASSIUM SERPL-SCNC: 3.9 MMOL/L (ref 3.5–5.3)
PROT SERPL-MCNC: 7.3 G/DL (ref 6.4–8.2)
SODIUM SERPL-SCNC: 136 MMOL/L (ref 136–145)
TSH SERPL DL<=0.05 MIU/L-ACNC: 2.53 UIU/ML (ref 0.36–3.74)
VIT B12 SERPL-MCNC: 432 PG/ML (ref 100–900)

## 2018-12-07 PROCEDURE — 80053 COMPREHEN METABOLIC PANEL: CPT

## 2018-12-07 PROCEDURE — 36415 COLL VENOUS BLD VENIPUNCTURE: CPT

## 2018-12-07 PROCEDURE — 82607 VITAMIN B-12: CPT

## 2018-12-07 PROCEDURE — 82306 VITAMIN D 25 HYDROXY: CPT

## 2018-12-07 PROCEDURE — 84443 ASSAY THYROID STIM HORMONE: CPT

## 2018-12-08 DIAGNOSIS — E55.9 VITAMIN D DEFICIENCY: Primary | ICD-10-CM

## 2018-12-10 ENCOUNTER — TELEPHONE (OUTPATIENT)
Dept: NEUROLOGY | Facility: CLINIC | Age: 43
End: 2018-12-10

## 2018-12-10 RX ORDER — ERGOCALCIFEROL 1.25 MG/1
50000 CAPSULE ORAL WEEKLY
Qty: 6 CAPSULE | Refills: 0 | Status: SHIPPED | OUTPATIENT
Start: 2018-12-10 | End: 2019-08-09 | Stop reason: ALTCHOICE

## 2018-12-10 NOTE — TELEPHONE ENCOUNTER
----- Message from Sarah Jain MD sent at 12/10/2018  8:53 AM EST -----  Please call patient and let them know their Vitamin D is low  I would recommend they start taking 2000 units Vitamin D daily (over the counter) and I will also prescribe Vit D 50,000 units weekly for 6 weeks

## 2018-12-16 NOTE — PROGRESS NOTES
[facility] Consult - Follow up   PATIENT:  Elisa Cantrell  MRN:  [id number]  :  1975  DATE OF SERVICE:  18  REFERRED BY: Chaparro Mcdowell MD  PMD: Ketty Pro MD    Assessment/Plan:     Chronic migraine without aura without status migrainosus, not intractable  Chronic daily headache  Chronic tension-type headache, intractable   Elisa Cantrell is a 44 y o  female referred here for evaluation of headache  Initial evaluation 2018 where she reported a lifetime of waxing and waning headaches and migraines that increased in 2018  On initial evaluation was having chronic daily headache and 2-3 migraines a week  -   She reports she has had 4-5 MRI brains with the most recent 2018 which was unchanged from  for patient, nonspecific white matter changes  Preventative:  - We have discussed headache hygiene in lifestyle factors that could improve her headaches  - We discussed preventative supplements that she can start taking including magnesium, riboflavin and melatonin  As of 2018 she has not yet started these, recommended at least starting melatonin  - 2018 We started venlafaxine, titrated up to 75 mg, to treat chronic pain and mood symptoms  - as of 2018 she has reported mild decreased frequency and severity of migraines, daily headache continues    Abortive:  - on initial visit 2018 was reporting sensitivity to all medications therefore very small dose Medrol ordered without effect  -2018 added Toradol 10 mg as needed for the more severe headaches and migraines  Discussed use, risks and possible side effects, no more than 10 per month  - 2018 she was describing her stabbing headache which is daily and we discussed trial of gabapentin 100-300 mg as abortive verses possible additional daily preventative if effective    Discussed risks and possible side effects   - has not tried dexamethasone, indomethacin Left-sided weakness  Numbness and tingling of left upper extremity/paresthesias  - She has intermittent paresthesias associated with her headaches as well as neck pain, left back pain up spine per patient and also has some pain in same area that she feels in front across left chest and into left armpit  - She reports left upper extremity weakness that started in September 2018 along with her other symptoms including increase in migraines after returning to work  - She has had trouble opening things with her left hand and carrying her laundry with her left arm   - she has tried physical therapy and reports this made her symptoms worse  -  Due to her neck pain, left-sided weakness (4+ entire LUE in all muscle groups, with breakthrough weakness, paresthesias, Lhermitte's sign and MRI brain with nonspecific white matter changes recommended MRI C and T-spine for further evaluation  - 12/13/2018 MRI C-spine and T-spine with and without contrast was normal  - exam unchanged today, from my expierence, possibly some effort related weakness, but could also be true weakness, and in the setting of paresthesias (although these could be related to HA) would like to rule out other pathologic cause, therefore EMG/NCS of upper extremities ordered  - also discussed following up with her primary care doctor regarding her epigastric pain, as she may have possible gastritis  Since she also mentioned pain radiating across the left chest under her armpits, recommended discussing this with her primary care doctor regarding whether further workup is indicated  She reports she had normal mammogram in the past, no family history of breast cancer         Anxiety and depression   She has a history of anxiety and depression and has never been on medication for this, before 11/21/2018 when venlafaxine started for headache prevention    - She was encouraged to establish care with a therapist, and provided a list of those covered under her insurance by our   - she plans to establish care after the holidays  - she reports mild improvement in her mood since starting venlafaxine 75 mg, although she is wary it is just situational since her son is home for a bit       Vitamin D deficiency  - vitamin-D 19 7 on 12/07/2018  - 6 weeks week with 80602 units, daily 2000 units  - recheck in 6 months                    PATIENT INSTRUCTIONS:    Left arm weakness, Paresthesia  -     EMG 2 Limb Upper Extremity; Future      When looking for a counselor/psychologist, ask if they do cognitive behavioral therapy     Headache/migraine treatment:   Abortive medications (for immediate treatment of a headache): It is ok to take ibuprofen, acetaminophen or naproxen (Advil, Tylenol,  Aleve, Excedrin) if they help your headaches you should limit these to No more than 3 times a week to avoid medication overuse/rebound headaches       You can take this daily as needed 1 to 3 times a day, start out with just 1 and see how you feel, if this helps we can transition to take this daily or increase dose  -     gabapentin (NEURONTIN) 100 mg capsule; Take 1 capsule (100 mg total) by mouth 3 (three) times a day as needed (stabbing headache)    Take this for the more severe headaches, but limited 10 a month due to it can be hard on your stomach  -     ketorolac (TORADOL) 10 mg tablet; Take 1 tablet (10 mg total) by mouth daily as needed for severe pain Max 1/day, 3/week, 10/month   Do not use with other OTC pain meds    Over the counter preventive supplements for headaches/migraines   - you can consider starting these, if things not improving  (to take every day to help prevent headaches - not to take at the time of headache):  [x] Magnesium 500mg daily   [x] Riboflavin (Vitamin B2)  400mg daily  (FYI B2 may make your urine bright/neon yellow)     Prescription preventive medications for headaches/migraines   (to take every day to help prevent headaches - not to take at the time of headache):  [x] - venlafaxine (EFFEXOR-XR) continue 75 mg PO daily     Sleep/headache prevention: * if her going to take any of the supplements, I recommend taking this 1 every night for the next few months  [x] Melatonin -  take 3 mg nightly for sleep  You should take this 1 hour prior to bedtime consistently every night for it to work  It works by gradually helping to adjust your sleep time over days to weeks, rather than immediately making you feel sleepy        Lifestyle Recommendations:  [x] SLEEP - Maintain a regular sleep schedule: Adults need at least 7-8 hours of uninterrupted a night  Maintain good sleep hygiene:  Going to bed and waking up at consistent times, avoiding excessive daytime naps, avoiding caffeinated beverages in the evening, avoid excessive stimulation in the evening and generally using bed primarily for sleeping  One hour before bedtime would recommend turning lights down lower, decreasing your activity (may read quietly, listen to music at a low volume)  When you get into bed, should eliminate all technology (no texting, emailing, playing with your phone, iPad or tablet in bed)  [x] HYDRATION - Maintain good hydration  Drink  2L of fluid a day (4 typical small water bottles)  [x] DIET - Maintain good nutrition  In particular don't skip meals and try and eat healthy balanced meals regularly  [x] TRIGGERS - Look for other triggers and avoid them: Limit caffeine to 1-2 cups a day or less  Avoid dietary triggers that you have noticed bring on your headaches (this could include aged cheese, peanuts, MSG, aspartame and nitrates)      Education and Follow-up  [x] Please call with any questions or concerns     [x] follow up with Primary doctor regarding pain that comes down mid sternum and radiates under left arm that started in September      Has follow-up with Dr Candance Gehrig on 02/14/2019 in [facility] at 3:00 p m , arrive by [phone]  After that, can follow up with either of us, depending on your preference or treatment plan, but just to be safe lets make an appointment for April with me      CC: We had the pleasure of evaluating Dayton Russell in neurological consultation today  she is a 44 y o    right handed female who presents today for evaluation of headaches       History of Present Illness:   Interval history as of 12/17/2018  - medrol 2 mg x 3 days - did not help   - Vitamin-D low and started on 6 weeks of 15494 units weekly and daily 2000 units  - 12/13/2018 MRI C-spine and T-spine with and without contrast normal  - has not met with psychology yet       What medications do you take or have you taken for your headaches? ABORTIVE:      Excedrin - takes the edge off, does not take it away - hurts your stomach  - has not been taking since has not had severe migraine since - has taken once a week (2-3 times that day)         - even ibuprofen is hard on her stomach when taken too much with other medications  - never has tried a triptan   - never has tried toradol, indomethacin, dexamethasone      PREVENTIVE:       - venlafaxine 75 mg started 11/21/2018 - no change yet in headaches - the first week or so had a stomach ache, these symptoms went away      Alternative therapies used in the past for headaches? physical therapy made it worse         What is your current pain level - 7     Headaches started at what age? 13/13 years old would have them a couple of times a year  How often do the headaches occur?  There has been ups and downs with them in her life  - 2013 they increased to 1/month  - Back in 2015 was having a migraine 1-2 a week and chronic daily headache, and pins and needles and numbness   - Months later she stopped working and these symptoms disapated and migraine 2-3 times a month  - Then they went away to a couple of times a year  - Increase since the beginning of September 2018, while she was working she was getting pins and needles in left arm and left leg  And the past couple of months has missed over 30 days of work since they are so bad  - As of 11/21/18 chronic daily headaches and 2-3 migraines a week, this has not yet improved in frequency, improved in severity*   What time of the day do the headaches start?  no particular time of day - always wakes up with headache but migraines anytime  How long do the headaches last?  Migraines last from 2-4 days    Are you ever headache free? No      Aura? Typically not   She reports less than 5 times she has had an aura prior to the headache of a Kaleidoscope effect and both eyes for 15-30 minutes        Describe your usual headache pain quality? It really varies   Throbbing, pulsating, achy, pressure, tight band, dull, shooting, electrical, stabbing, pain that moves around  Where is your headache located? It moves and can be anywhere on head, the stabbing pain can be the size of a fist and move around the head - there all the time   The migraines are either right or left with nausea and visual disturbance   Does the pain Radiate?  no radiation of pain  What is the intensity of pain?  Usually 8-10/10  Associated symptoms:   [x] Nausea       [x] Vomiting        [] Diarrhea         [x] Insomnia           [x] Stiff or sore neck - neck stiff throbbing and shooting pain         [x] Problems with concentration  [x] Photophobia     [x]Phonophobia      [x] Osmophobia  [x] Blurred vision  - both eyes, never total vision loss in one eye or both  [x] Prefer quiet, dark room        [x] Light-headed or dizzy - even when turning head to fast   [] Tinnitus      [] Red ear      [x] Ptosis  She reports sometimes she will have ptosis of the left or right eye      [] Facial droop  [] Lacrimation  [] Nasal congestion/rhinorrhea   [] Flushing of face         [x] Change in pupil size - has been told on occasion that pupils are dilated differently   [x] Hands or feet tingle or feel numb/paresthesias    - entire neck pain, achy, shooting pain like a line down the back and only tingling and numbness on the left side - lasts 10-15 minutes  - usually not in left leg, but can have it happen if she sleeps on that side, or if she is at work and walking and it will happen, over 10-15 creeping down leg   - can start near neck and go all the way down left arm or start in left and go all the way up  - also can happen pins and needles in chin  - happens with really bad migraines      Number of days missed per month because of headaches:  Work days: 29     Things that make the headache worse? Movement, everything - when has migraines, turning neck      Headache triggers: if had pain at night due to poor sleep,   Kept a journal in the past and could not find anything consistent      What time of the year do headaches occur more frequently?   do not seem to be related to any time of the year     Have you seen someone else for headaches or pain? Yes, Dr Cris Manzano     Have you had trigger point injection performed and how often? No  Have you had Botox injection performed and how often? No   Have you had epidural injections or transforaminal injections performed? No  Have you used CBD or THC for your headaches and how often? No  Are you current pregnant or planning on getting pregnant? No, tubes tied   Have you ever had any Brain imaging? MRI Brains x 4-5 - 11/2018 unchanged from 2015 per patient  MRIs of the spine have been denied        Neck pain?:  Yes  Left back pain up spine   Across left chest and into left armpit and down     When did the neck pain start? September 2018  Does your neck pain cause you to have headaches? yes  Is your neck pain associated with? dizziness, , balance problem, numbness or weakness in your arm or leg - left side weaker - trouble carrying laundry and opening bottles   What aggravates neck pain?    Everything, lifting, computer work, using phone, chin to chest, looking at ceiling, turning head to left or right  What alleviates neck pain?   hot pack for a little     pain shoots up spine when chin forward      LIFESTYLE  Sleep -  "too much" - trouble staying asleep, wakes up with pain in neck or head, in bed 9 hours   Physical activity:  walk at work  Water:  3 bottles   Diet:  Do you ever skip meals? no     Mood:  anxiety and depression, comes and goes   - never been on a medication previously   -  talked to a therapist many years ago, over 10 years ago   -  can feel when it is getting worse, right now stable   -  this pain brings her down    - referred 11/21/18 to psychology and started venlafaxine   - has not established care with a therapist or psychology yet, but plans to do in the new year after the holidays  - reports mood is slightly improved, she thinks may be situational since her son recently graduated college and is home for bit before moving again to [facility]     The following portions of the patient's history were reviewed and updated as appropriate: allergies, current medications, past family history, past medical history, past social history, past surgical history and problem list             Past Medical History:     Past Medical History:   Diagnosis Date    Migraine        Patient Active Problem List   Diagnosis    Vitamin D deficiency    Chronic migraine without aura without status migrainosus, not intractable    Chronic daily headache       Medications:      Current Outpatient Prescriptions   Medication Sig Dispense Refill    Biotin 10 MG CAPS Take by mouth      ergocalciferol (VITAMIN D2) 50,000 units Take 1 capsule (50,000 Units total) by mouth once a week For 6 weeks 6 capsule 0    multivitamin (THERAGRAN) TABS Take 1 tablet by mouth      TRANSDERM-SCOP, 1 5 MG, 1 MG/3DAYS TD 72 hr patch APPLY ONE EVERY THREE DAYS AS NEEDED  2    venlafaxine (EFFEXOR-XR) 37 5 mg 24 hr capsule 1 tab PO daily for 2 weeks, if tolerated increase to 2 tabs PO daily 60 capsule 1     No current facility-administered medications for this visit  Allergies:    No Known Allergies    Family History:     Family History   Problem Relation Age of Onset    No Known Problems Sister     No Known Problems Son     No Known Problems Daughter        Social History:   Work:   Education: 15  Lives with daughter Sahara she is 15  2 kids total      Social History     Social History    Marital status: /Civil Union     Spouse name: N/A    Number of children: N/A    Years of education: N/A     Occupational History    Not on file  Social History Main Topics    Smoking status: Never Smoker    Smokeless tobacco: Never Used    Alcohol use Yes      Comment: social    Drug use: No    Sexual activity: Not on file     Other Topics Concern    Not on file     Social History Narrative    No narrative on file         Objective:   Physical Exam:                                                                   Vitals:            Constitutional:    /90 (BP Location: Right arm, Patient Position: Sitting, Cuff Size: Standard)   Pulse 93   Ht 5' (1 524 m)   Wt 74 8 kg (165 lb)   BMI 32 22 kg/m²   BP Readings from Last 3 Encounters:   12/17/18 122/90   11/21/18 102/88   04/06/15 118/74     Pulse Readings from Last 3 Encounters:   12/17/18 93   11/21/18 68   04/06/15 100         Well developed, well nourished, well groomed  No dysmorphic features  HEENT:  Normocephalic atraumatic  No meningismus  Oropharynx is clear and moist  No oral mucosal lesions  Chest:  Respirations regular and unlabored  Cardiovascular:  Regular rate, intact distal pulses  Distal extremities warm without palpable edema or tenderness, no observed significant swelling  Musculoskeletal:  Full range of motion  (see below under neurologic exam for evaluation of motor function and gait)   Skin:  warm and dry, not diaphoretic  No apparent birthmarks or stigmata of neurocutaneous disease     Psychiatric:  Depressed affect       Neurological Examination: Mental status/cognitive function:   Orientated to time, place and person  Recent and remote memory intact  Attention span and concentration as well as fund of knowledge are appropriate for age  Normal language and spontaneous speech  Cranial Nerves:  II-visual fields full  Fundi appear normal bilaterally  III, IV, VI-Pupils were equal, round, and reactive to light and accomodation  Extraocular movements were full and conjugate without nystagmus  conjugate gaze, normal smooth pursuits, normal saccades   V-facial sensation symmetric  VII-facial expression symmetric, intact forehead wrinkle, strong eye closure, symmetric smile    VIII-hearing grossly intact bilaterally   IX, X-palate elevation symmetric, no dysarthria  XI-shoulder shrug strength intact    XII-tongue protrusion midline  Motor Exam: symmetric bulk and tone throughout, no pronator drift  Power/strength 5/5 right upper and bilateral lower extremities, no atrophy, fasciculations or abnormal movements noted  Left UE deltoid, biceps, triceps,  4+/5, breakthrough weakness     Sensory: grossly intact light touch in all extremities  Reflexes: brachioradialis 2+, biceps 2+, knee 2+, ankle 2+ bilaterally  No ankle clonus  Coordination: Finger nose finger intact bilaterally, no apparent dysmetria, ataxia or tremor noted  Gait: steady casual and tandem gait  Romberg Negative         Pertinent lab results:   3/17/15 are the last labs in the system   Vit D 17 7   TSH 1 6     2018:  CMP within normal limits  Vitamin-D 19 7  B12 432  TSH 2 53    Imagin2018 MRI brain with and without contrast  Nonspecific white matter T2 hyperintensities noted which do not enhance with contrast range in size from 3 mm to 6 mm    (Personally reviewed last visit - and had uploaded to PACs and returned to patient today, however, I can not seem to view images in PACs today  )    The 2018 MRI C-spine and T-spine with and without contrast: External imaging, personally reviewed CT and agree with radiology read of Normal  - we will send to PACs to be uploaded     Review of Systems:   ROS Personally reviewed  Review of Systems   Constitutional: Positive for fatigue  HENT: Negative  Eyes: Negative  Respiratory: Negative  Cardiovascular: Negative  Gastrointestinal: Positive for nausea  Endocrine: Negative  Genitourinary: Negative  Musculoskeletal: Positive for neck pain and neck stiffness  Skin: Negative  Allergic/Immunologic: Negative  Neurological: Positive for dizziness, light-headedness and headaches (daily)  Hematological: Negative  Psychiatric/Behavioral: Negative  I have spent 45 minutes with Patient  today in which greater than 50% of this time was spent in counseling/coordination of care regarding Diagnostic results, Prognosis, Risks and benefits of tx options, Intructions for management, Importance of tx compliance, Risk factor reductions and Impressions        Author:  Da Roberson MD 12/17/2018 8:53 AM

## 2018-12-17 ENCOUNTER — OFFICE VISIT (OUTPATIENT)
Dept: NEUROLOGY | Facility: CLINIC | Age: 43
End: 2018-12-17
Payer: COMMERCIAL

## 2018-12-17 VITALS
DIASTOLIC BLOOD PRESSURE: 90 MMHG | WEIGHT: 165 LBS | BODY MASS INDEX: 32.39 KG/M2 | SYSTOLIC BLOOD PRESSURE: 122 MMHG | HEIGHT: 60 IN | HEART RATE: 93 BPM

## 2018-12-17 DIAGNOSIS — F32.A ANXIETY AND DEPRESSION: ICD-10-CM

## 2018-12-17 DIAGNOSIS — R29.898 LEFT ARM WEAKNESS: ICD-10-CM

## 2018-12-17 DIAGNOSIS — E55.9 VITAMIN D DEFICIENCY: ICD-10-CM

## 2018-12-17 DIAGNOSIS — R20.2 PARESTHESIA: ICD-10-CM

## 2018-12-17 DIAGNOSIS — G43.709 CHRONIC MIGRAINE WITHOUT AURA WITHOUT STATUS MIGRAINOSUS, NOT INTRACTABLE: Primary | ICD-10-CM

## 2018-12-17 DIAGNOSIS — R51.9 CHRONIC DAILY HEADACHE: ICD-10-CM

## 2018-12-17 DIAGNOSIS — F41.9 ANXIETY AND DEPRESSION: ICD-10-CM

## 2018-12-17 DIAGNOSIS — G44.229 CHRONIC TENSION-TYPE HEADACHE, NOT INTRACTABLE: ICD-10-CM

## 2018-12-17 PROCEDURE — 99215 OFFICE O/P EST HI 40 MIN: CPT | Performed by: PSYCHIATRY & NEUROLOGY

## 2018-12-17 RX ORDER — GABAPENTIN 100 MG/1
100 CAPSULE ORAL 3 TIMES DAILY PRN
Qty: 90 CAPSULE | Refills: 3 | Status: SHIPPED | OUTPATIENT
Start: 2018-12-17 | End: 2019-03-26

## 2018-12-17 RX ORDER — KETOROLAC TROMETHAMINE 10 MG/1
10 TABLET, FILM COATED ORAL DAILY PRN
Qty: 10 TABLET | Refills: 0 | Status: SHIPPED | OUTPATIENT
Start: 2018-12-17 | End: 2019-01-28

## 2018-12-17 NOTE — PROGRESS NOTES
Patient ID: Tejinder Martinez is a 44 y o  female  Assessment/Plan:    No problem-specific Assessment & Plan notes found for this encounter  {Assess/PlanSmartLinks:69647}       Subjective:    HPI    {[facility] HPI texts:99383}    {Common ambulatory SmartLinks:74972}         Objective:    Blood pressure 122/90, pulse 93, height 5' (1 524 m), weight 74 8 kg (165 lb)  Physical Exam    Neurological Exam      ROS:    Review of Systems   Constitutional: Positive for fatigue  HENT: Negative  Eyes: Negative  Respiratory: Negative  Cardiovascular: Negative  Gastrointestinal: Positive for nausea  Endocrine: Negative  Genitourinary: Negative  Musculoskeletal: Positive for neck pain and neck stiffness  Skin: Negative  Allergic/Immunologic: Negative  Neurological: Positive for dizziness, light-headedness and headaches (daily)  Hematological: Negative  Psychiatric/Behavioral: Negative

## 2018-12-18 ENCOUNTER — HOSPITAL ENCOUNTER (OUTPATIENT)
Dept: NEUROLOGY | Facility: AMBULATORY SURGERY CENTER | Age: 43
Discharge: HOME | End: 2018-12-18
Payer: COMMERCIAL

## 2018-12-18 DIAGNOSIS — R29.898 LEFT ARM WEAKNESS: ICD-10-CM

## 2018-12-18 DIAGNOSIS — R20.2 PARESTHESIA: ICD-10-CM

## 2018-12-18 PROCEDURE — 95886 MUSC TEST DONE W/N TEST COMP: CPT | Performed by: PSYCHIATRY & NEUROLOGY

## 2018-12-18 PROCEDURE — 95911 NRV CNDJ TEST 9-10 STUDIES: CPT | Performed by: PSYCHIATRY & NEUROLOGY

## 2018-12-19 ENCOUNTER — TELEPHONE (OUTPATIENT)
Dept: NEUROLOGY | Facility: CLINIC | Age: 43
End: 2018-12-19

## 2018-12-19 DIAGNOSIS — R29.898 LEFT ARM WEAKNESS: ICD-10-CM

## 2018-12-19 DIAGNOSIS — M54.12 CERVICAL RADICULOPATHY: Primary | ICD-10-CM

## 2018-12-19 DIAGNOSIS — R20.2 PARESTHESIA: ICD-10-CM

## 2018-12-19 NOTE — TELEPHONE ENCOUNTER
Made patient aware  Verbalizes clear understanding     ----- Message from Kimberly Ritchie MD sent at 12/19/2018  9:41 AM EST -----  Could you please call patient and let her know that the EMG results from yesterday show that she has left C5-C6 radiculopathy and that I recommend following up with pain management to see what other options there are for treatment  (I just put in referral now ) This is not a referral to a surgeon, the pain specialists may offer options regarding injections or other options that could really help her neck pain and possibly her headaches as well

## 2019-01-04 ENCOUNTER — TELEPHONE (OUTPATIENT)
Dept: NEUROLOGY | Facility: CLINIC | Age: 44
End: 2019-01-04

## 2019-01-17 DIAGNOSIS — G43.019 INTRACTABLE MIGRAINE WITHOUT AURA AND WITHOUT STATUS MIGRAINOSUS: Primary | ICD-10-CM

## 2019-01-17 NOTE — TELEPHONE ENCOUNTER
pt called and states that meds are not helping her migraines  currently taking venlafaxine 75mg daily  gabapentin 100mg  1 cap tid prn stabbing headache  ketorolac 10mg 1 tab prn  mag   vit d 2000 units  she did not start vit b2 and is not taking melatonin   having daily migraines  tend to last 2-4 days, never fully resolve  typically between 8-9/10  +N/V/light/sound sensitivity  during headaches when she lays down or sits up she gets extremely dizzy  it reminds her like she is on a carosel with her eyes closed and she is seeing a hint of light  her eyes are closed at this time  she has not seen pain management yet as she has heard from them  i forwarded referral for scheduling and gave pt their phone number  lately she has been having insomnia  in the last 3 weeks it has occured  once every week, she had gone 48 hours without sleeping  currently has a headache   7-8/10   earlier today had nausea, but not athis moment    [phone]

## 2019-01-17 NOTE — TELEPHONE ENCOUNTER
I would recommend for now: Depakote 500 mg PO QHS for 3 days then 250 mg QHS for 2 days then stop  If this is effective for her we could consider switching her to this medication daily, or in the future we could increase her venlafaxine a bit or increase the gabapentin

## 2019-01-18 RX ORDER — DIVALPROEX SODIUM 250 MG/1
TABLET, DELAYED RELEASE ORAL
Qty: 8 TABLET | Refills: 0 | Status: SHIPPED | OUTPATIENT
Start: 2019-01-18 | End: 2019-01-28

## 2019-01-18 NOTE — TELEPHONE ENCOUNTER
The Depakote may help and if she tolerates it with improvement in her headache, we could consider having her take this nightly

## 2019-01-18 NOTE — TELEPHONE ENCOUNTER
Pt made aware  She is agreeable to depakote  Script entered for Cheryl Arnold  She states that she was able to get 4 hours of sleep this am but that is the first time she sleep since Tuesday night  She tried melatonin in the past, she states that it was not effective for her  She is unsure what dose she took  She is asking if you can recommend anything to possibly help her sleep  She is agreeable to trying again    Please advise

## 2019-01-28 ENCOUNTER — CLINICAL SUPPORT (OUTPATIENT)
Dept: PAIN MEDICINE | Facility: CLINIC | Age: 44
End: 2019-01-28
Payer: COMMERCIAL

## 2019-01-28 VITALS
TEMPERATURE: 98.6 F | HEIGHT: 65 IN | SYSTOLIC BLOOD PRESSURE: 119 MMHG | DIASTOLIC BLOOD PRESSURE: 84 MMHG | HEART RATE: 88 BPM | WEIGHT: 170 LBS | BODY MASS INDEX: 28.32 KG/M2

## 2019-01-28 DIAGNOSIS — M54.12 CERVICAL RADICULOPATHY: Primary | ICD-10-CM

## 2019-01-28 PROCEDURE — 99244 OFF/OP CNSLTJ NEW/EST MOD 40: CPT | Performed by: ANESTHESIOLOGY

## 2019-01-28 NOTE — PROGRESS NOTES
Assessment:  1  Cervical radiculopathy        Plan:  18-year-old female with a history of migraines presenting for initial consultation regarding neck pain and cervical radiculopathy into the left upper extremity for the past 5 months  She denies any trauma or inciting event  MRI of the patient's cervical and thoracic spine were unremarkable and did not show any compressive pathology or degenerative disease  However, EMG of the patient's upper extremities shows chronic left C5-6 radiculopathy  The patient is currently taking venlafaxine 75 mg daily and gabapentin 100 mg t i d  Which is prescribed by Neurology  She has tried various NSAIDs without relief  She has done physical therapy in the past which has been ineffective  1  I will schedule the patient for cervical epidural steroid injection to reduce the inflammatory component of her pain  2  The patient may continue with venlafaxine and gabapentin as prescribed by Neurology  Patient may benefit from further titration upwards on these medications  I will leave this to the discretion of Neurology  3  Patient will continue with her home exercise program  4  I will follow up the patient in 2 months    Complete risks and benefits including bleeding, infection, tissue reaction, nerve injury and allergic reaction were discussed  The approach was demonstrated using models and literature was provided  Verbal and written consent was obtained  My impressions and treatment recommendations were discussed in detail with the patient who verbalized understanding and had no further questions  Discharge instructions were provided  I personally saw and examined the patient and I agree with the above discussed plan of care  No orders of the defined types were placed in this encounter  No orders of the defined types were placed in this encounter        History of Present Illness:    Ruthy Rodriguez is a 44 y o  female with a history of migraines presenting for initial consultation regarding a 5 month history of neck pain that radiates into the left upper extremity in no specific dermatomal fashion with associated numbness, paresthesias, and subjective weakness  She denies any right upper extremity symptoms, bladder bowel incontinence  She does have some balance issues  MRI of the patient's cervical and thoracic spine were unremarkable and did not show any compressive pathology or degenerative disease  However, EMG of the patient's upper extremities shows chronic left C5-6 radiculopathy  The patient is currently taking venlafaxine 75 mg daily and gabapentin 100 mg t i d  Which is prescribed by Neurology  She has tried various NSAIDs without relief  She has done physical therapy in the past which has been ineffective  She has not found any relief with a TENS unit  She does find some moderate transient relief with heat and ice application  The patient rates her pain a 9/10 on the pain is nearly constant  The pain does not follow any particular pattern throughout the day  The pain is described as shooting, numbness, dull, aching, pins and needles, and throbbing  The pain is increased with lying down, standing, bending, sitting, walking, exercise, and relaxation  There are no specific positional alleviating factors  I have personally reviewed and/or updated the patient's past medical history, past surgical history, family history, social history, current medications, allergies, and vital signs today  Other than as stated above, the patient denies any interval changes in medications, medical condition, mental condition, symptoms, or allergies since the last office visit  Review of Systems:    Review of Systems   Constitutional: Positive for fever and unexpected weight change  HENT: Negative for trouble swallowing  Eyes: Positive for visual disturbance  Respiratory: Negative for shortness of breath and wheezing      Cardiovascular: Positive for chest pain  Negative for palpitations  Gastrointestinal: Positive for nausea and vomiting  Negative for constipation and diarrhea  Endocrine: Negative for cold intolerance, heat intolerance and polydipsia  Genitourinary: Negative for difficulty urinating and frequency  Musculoskeletal: Negative for arthralgias, gait problem, joint swelling and myalgias  Skin: Negative for rash  Neurological: Positive for dizziness and weakness  Negative for seizures, syncope and headaches  Hematological: Does not bruise/bleed easily  Psychiatric/Behavioral: Negative for dysphoric mood  All other systems reviewed and are negative        Patient Active Problem List   Diagnosis    Vitamin D deficiency    Chronic migraine without aura without status migrainosus, not intractable    Chronic daily headache    Left arm weakness    Anxiety and depression    Chronic tension-type headache, not intractable    Paresthesia    Cervical radiculopathy       Past Medical History:   Diagnosis Date    Depression     Migraine        Past Surgical History:   Procedure Laterality Date    TUBAL LIGATION         Family History   Problem Relation Age of Onset    No Known Problems Sister     No Known Problems Son     No Known Problems Daughter        Social History     Occupational History          Social History Main Topics    Smoking status: Never Smoker    Smokeless tobacco: Never Used    Alcohol use Yes      Comment: social    Drug use: No    Sexual activity: Not on file       Current Outpatient Prescriptions on File Prior to Visit   Medication Sig    Biotin 10 MG CAPS Take by mouth    ergocalciferol (VITAMIN D2) 50,000 units Take 1 capsule (50,000 Units total) by mouth once a week For 6 weeks    gabapentin (NEURONTIN) 100 mg capsule Take 1 capsule (100 mg total) by mouth 3 (three) times a day as needed (stabbing headache)    multivitamin (THERAGRAN) TABS Take 1 tablet by mouth    TRANSDERM-SCOP, 1 5 MG, 1 MG/3DAYS TD 72 hr patch APPLY ONE EVERY THREE DAYS AS NEEDED    venlafaxine (EFFEXOR-XR) 37 5 mg 24 hr capsule 1 tab PO daily for 2 weeks, if tolerated increase to 2 tabs PO daily    [DISCONTINUED] divalproex sodium (DEPAKOTE) 250 mg EC tablet Take 2 tabs at bedtime for 3 days then take 1 tab at bedtime for 2 days and then stop   [DISCONTINUED] ketorolac (TORADOL) 10 mg tablet Take 1 tablet (10 mg total) by mouth daily as needed for severe pain Max 1/day, 3/week, 10/month  Do not use with other OTC pain meds     No current facility-administered medications on file prior to visit  No Known Allergies    Physical Exam:    /84   Pulse 88   Temp 98 6 °F (37 °C) (Oral)   Ht 5' 5" (1 651 m)   Wt 77 1 kg (170 lb)   BMI 28 29 kg/m²     Constitutional: normal, well developed, well nourished, alert, in no distress and non-toxic and no overt pain behavior  Eyes: anicteric  HEENT: grossly intact  Neck: supple, symmetric, trachea midline and no masses   Pulmonary:even and unlabored  Cardiovascular:No edema or pitting edema present  Skin:Normal without rashes or lesions and well hydrated  Psychiatric:Mood and affect appropriate  Neurologic:Cranial Nerves II-XII grossly intact  Musculoskeletal:normal gait  Bilateral cervical paraspinals and left trapezius tender to palpation ropy in texture  Full range of motion of cervical spine in all planes  Bilateral biceps, triceps, and brachioradialis reflexes were 2/4 and symmetrical   Negative Cordero's reflex bilaterally  Bilateral upper extremity strength 5/5 in all muscle groups with the exception of left  strength which was 4/5  Sensation intact to light touch in the C5 through T1 dermatomes bilaterally, however decreased to light touch in the entire left upper extremity in no specific dermatomal fashion  Negative Spurling's bilaterally      Imaging      PACS Images     Show images for EXTERNAL IMAGING   Order Report      Order Details   Scans on Order [id number]     Radiology Results Ext - Document on 12/14/2018 12:33 PM : Radiology   Imaging     EXTERNAL IMAGING (Order #[id number]) on 12/14/2018 - Imaging Information   Exam Information     Status Exam Begun  Exam Ended    Final [99]       External Results Report     Open External Results Report    Encounter     View Encounter          Transcription                  Associated Documents:    Radiology Results Ext - Document on 12/14/2018 12:33 PM : Radiology        Document Information      Patient Care Timeline     No data selected in time range   Pre-op Summary     Pre-op           Recovery Summary     Recovery              PACS Images     Show images for EXTERNAL IMAGING   Order Report      Order Details   Scans on Order [id number]     Radiology Results Ext - Document on 12/14/2018 12:33 PM : Radiology   Imaging     EXTERNAL IMAGING (Order #[id number]) on 12/14/2018 - Imaging Information   Exam Information     Status Exam Begun  Exam Ended    Final [99]       External Results Report     Open External Results Report    Encounter     View Encounter          Transcription                  Associated Documents:    Radiology Results Ext - Document on 12/14/2018 12:33 PM : Radiology        Document Information      Patient Care Timeline     No data selected in time range   Pre-op Summary     Pre-op           Recovery Summary     Recovery

## 2019-01-29 ENCOUNTER — TELEPHONE (OUTPATIENT)
Dept: RADIOLOGY | Facility: CLINIC | Age: 44
End: 2019-01-29

## 2019-01-29 ENCOUNTER — TELEPHONE (OUTPATIENT)
Dept: PAIN MEDICINE | Facility: CLINIC | Age: 44
End: 2019-01-29

## 2019-01-29 NOTE — TELEPHONE ENCOUNTER
Called patient to schedule HUGH, 02/26/19 arrival 2:15, pt denies RX blood thinners and NSAIDS    Went over pre procedure instructions, NPO 1 hr prior, if sick or on abx needs to call to rs, wear loose, comf clothing like Tshirt- no jewelry, needs   Pt verbalized understanding

## 2019-01-29 NOTE — TELEPHONE ENCOUNTER
Patient  [address]     Patient called to schedule a procedure appt for the [facility]  Please call to schedule  Thank you Tulio Martinez

## 2019-02-19 ENCOUNTER — TELEPHONE (OUTPATIENT)
Dept: NEUROLOGY | Facility: CLINIC | Age: 44
End: 2019-02-19

## 2019-02-19 NOTE — TELEPHONE ENCOUNTER
Received fax but letter was not address to [place] [id number]  Inform patient to contact [employer] and have letter readdress to [place] [id number]   Once received will send  to [facility] to forward Neurology office notes

## 2019-02-19 NOTE — TELEPHONE ENCOUNTER
Letter received and fax to [facility] and scan into patient chart

## 2019-02-19 NOTE — TELEPHONE ENCOUNTER
Left message for Tiffanie Rebolledo  To fax note from [facility] [place] [address] as the ones sent thru [facility] is not the original  [employer] number was left   Once received will send to [facility] to release office notes

## 2019-02-26 ENCOUNTER — HOSPITAL ENCOUNTER (OUTPATIENT)
Dept: RADIOLOGY | Facility: CLINIC | Age: 44
Discharge: HOME | End: 2019-02-26
Payer: COMMERCIAL

## 2019-02-26 VITALS
OXYGEN SATURATION: 98 % | SYSTOLIC BLOOD PRESSURE: 116 MMHG | TEMPERATURE: 97.8 F | DIASTOLIC BLOOD PRESSURE: 79 MMHG | RESPIRATION RATE: 18 BRPM | HEART RATE: 74 BPM

## 2019-02-26 DIAGNOSIS — M54.12 CERVICAL RADICULOPATHY: ICD-10-CM

## 2019-02-26 PROCEDURE — 62321 NJX INTERLAMINAR CRV/THRC: CPT | Performed by: ANESTHESIOLOGY

## 2019-02-26 RX ORDER — LIDOCAINE HYDROCHLORIDE 10 MG/ML
5 INJECTION, SOLUTION EPIDURAL; INFILTRATION; INTRACAUDAL; PERINEURAL ONCE
Status: COMPLETED | OUTPATIENT
Start: 2019-02-26 | End: 2019-02-26

## 2019-02-26 RX ORDER — PAPAVERINE HCL 150 MG
10 CAPSULE, EXTENDED RELEASE ORAL ONCE
Status: COMPLETED | OUTPATIENT
Start: 2019-02-26 | End: 2019-02-26

## 2019-02-26 RX ADMIN — IOHEXOL 1 ML: 300 INJECTION, SOLUTION INTRAVENOUS at 09:51

## 2019-02-26 RX ADMIN — DEXAMETHASONE SODIUM PHOSPHATE 10 MG: 10 INJECTION, SOLUTION INTRAMUSCULAR; INTRAVENOUS at 09:52

## 2019-02-26 RX ADMIN — LIDOCAINE HYDROCHLORIDE 3 ML: 10 INJECTION, SOLUTION EPIDURAL; INFILTRATION; INTRACAUDAL; PERINEURAL at 09:49

## 2019-02-26 NOTE — DISCHARGE INSTRUCTIONS
Epidural Steroid Injection   WHAT YOU NEED TO KNOW:   An epidural steroid injection (KESHAV) is a procedure to inject steroid medicine into the epidural space  The epidural space is between your spinal cord and vertebrae  Steroids reduce inflammation and fluid buildup in your spine that may be causing pain  You may be given pain medicine along with the steroids  ACTIVITY  · Do not drive or operate machinery today  · No strenuous activity today - bending, lifting, etc   · You may resume normal activites starting tomorrow - start slowly and as tolerated  · You may shower today, but no tub baths or hot tubs  · You may have numbness for several hours from the local anesthetic  Please use caution and common sense, especially with weight-bearing activities  CARE OF THE INJECTION SITE  · If you have soreness or pain, apply ice to the area today (20 minutes on/20 minutes off)  · Starting tomorrow, you may use warm, moist heat or ice if needed  · You may have an increase or change in your discomfort for 36-48 hours after your treatment  · Apply ice and continue with any pain medication you have been prescribed  · Notify the [facility] if you have any of the following: redness, drainage, swelling, headache, stiff neck or fever above 100°F     SPECIAL INSTRUCTIONS  · Our office will contact you in approximately 7 days for a progress report  MEDICATIONS  · Continue to take all routine medications  · Our office may have instructed you to hold some medications  If you have a problem specifically related to your procedure, please call our office at [phone]  Problems not related to your procedure should be directed to your primary care physician

## 2019-02-26 NOTE — H&P
History of Present Illness: The patient is a 44 y o  female who presents with complaints of neck and arm pain  Patient Active Problem List   Diagnosis    Vitamin D deficiency    Chronic migraine without aura without status migrainosus, not intractable    Chronic daily headache    Left arm weakness    Anxiety and depression    Chronic tension-type headache, not intractable    Paresthesia    Cervical radiculopathy       Past Medical History:   Diagnosis Date    Depression     Migraine        Past Surgical History:   Procedure Laterality Date    TUBAL LIGATION           Current Outpatient Medications:     Biotin 10 MG CAPS, Take by mouth, Disp: , Rfl:     ergocalciferol (VITAMIN D2) 50,000 units, Take 1 capsule (50,000 Units total) by mouth once a week For 6 weeks, Disp: 6 capsule, Rfl: 0    gabapentin (NEURONTIN) 100 mg capsule, Take 1 capsule (100 mg total) by mouth 3 (three) times a day as needed (stabbing headache), Disp: 90 capsule, Rfl: 3    multivitamin (THERAGRAN) TABS, Take 1 tablet by mouth, Disp: , Rfl:     TRANSDERM-SCOP, 1 5 MG, 1 MG/3DAYS TD 72 hr patch, APPLY ONE EVERY THREE DAYS AS NEEDED, Disp: , Rfl: 2    venlafaxine (EFFEXOR-XR) 37 5 mg 24 hr capsule, 1 tab PO daily for 2 weeks, if tolerated increase to 2 tabs PO daily, Disp: 60 capsule, Rfl: 1    No Known Allergies    Physical Exam:   Vitals:    02/26/19 0932   BP: 129/84   Pulse: 90   Resp: 18   Temp: 97 8 °F (36 6 °C)   SpO2: 99%     General: Awake, Alert, Oriented x 3, Mood and affect appropriate  Respiratory: Respirations even and unlabored  Cardiovascular: Peripheral pulses intact; no edema  Musculoskeletal Exam:  Bilateral cervical paraspinals tender to palpation  ASA Score: 2    Patient/Chart Verification  Patient ID Verified: Verbal  ID Band Applied: No  Consents Confirmed: Procedural  H&P( within 30 days) Verified:  To be obtained in the Pre-Procedure area  Interval H&P(within 24 hr) Complete (required for Outpatients and Surgery Admit only): To be obtained in the Pre-Procedure area  Allergies Reviewed: Yes  Anticoag/NSAID held?: NA  Currently on antibiotics?: No  Pregnancy denied?: Yes    Assessment:   1   Cervical radiculopathy        Plan: cervical radiculopathy - HUGH

## 2019-03-05 ENCOUNTER — TELEPHONE (OUTPATIENT)
Dept: PAIN MEDICINE | Facility: CLINIC | Age: 44
End: 2019-03-05

## 2019-03-05 NOTE — PROGRESS NOTES
[facility]  PATIENT:  Sam Berman  MRN:  [id number]  :  1975  DATE OF SERVICE:  3/6/2019      Assessment/Plan:        Problem List Items Addressed This Visit        Cardiovascular and Mediastinum    Chronic migraine without aura without status migrainosus, not intractable - Primary    Relevant Medications    magnesium oxide (MAG-OX) 400 mg    Riboflavin ([employer] VITAMIN B-2) 100 MG TABS    topiramate (TOPAMAX) 25 mg tablet    OLANZapine (ZyPREXA) 5 mg tablet    rizatriptan (MAXALT) 10 MG tablet    prochlorperazine (COMPAZINE) 10 mg tablet    cyclobenzaprine (FLEXERIL) 10 mg tablet       Nervous and Auditory    Cervical radiculopathy    Relevant Medications    cyclobenzaprine (FLEXERIL) 10 mg tablet    RESOLVED: Chronic tension-type headache, not intractable    Relevant Medications    topiramate (TOPAMAX) 25 mg tablet    rizatriptan (MAXALT) 10 MG tablet    cyclobenzaprine (FLEXERIL) 10 mg tablet       Other    Trigeminal autonomic cephalgias    Relevant Medications    topiramate (TOPAMAX) 25 mg tablet    OLANZapine (ZyPREXA) 5 mg tablet    rizatriptan (MAXALT) 10 MG tablet    cyclobenzaprine (FLEXERIL) 10 mg tablet            Diagnosis: Suspect patient has  Chronic migraine headaches  Suspect trigeminal autonomic cephalgia  Chronic cervicalgia with cervical dystonia  Left cervical radiculopathy at C5-C6      Preventive therapy for headaches:   -Over-the-counter supplements: to decrease intensity and frequency of migraines  - Magnesium Oxide 400mg a day  If any diarrhea or upset stomach, decrease dose  as tolerated  -  B2 200 mg twice a day   This supplement will change the color of the urine to fluorescent yellow no matter how hydrated, which is normal    - Topamax:  1 tab at bedtime for 3 days then 2 tabs at bedtime for 3 days then 3 tabs at bedtime for 3 days then 4 tabs after that  - Olanzapine 5 mg at bedtime daily  - Will apply for Botox  Abortive therapy for headaches:   - At the onset of a severe headache patient is to take Maxalt (rizatriptan) 10 mg and Compazine (prochlorperazine) 10 mg  If headache persists she may take her rizatriptan again 10 mg but this time with Benadryl 50 mg     Neck pain/cervical dystonia mild:   - Has tries physical therapy which made symptoms worse  - Currently seeing Pain Management and had an epidural injection  - will start patient on Flexeril 10 mg at bedtime for 10 days then increase up to 10 mg twice a day     Headache management instructions  - When patient has a moderate to severe headache, they should seek rest, initiate relaxation and apply cold compresses to the head  - Maintain regular sleep schedule  Adults need at least 7-8 hours of uninterrupted a night  - Limit over the counter medications such as Tylenol, Ibuprofen, Aleve, Excedrin  (No more than 3 times a week)  - Maintain headache diary  We discussed an NOEL for a smart phone is "Migraine juan ramon"  - Limit caffeine to 1-2 cups 8 to 16 oz a day or less  - Avoid dietary trigger  (aged cheese, peanuts, MSG, aspartame and nitrates)  - Patient is to have regular frequent meals to prevent headache onset  - Please drink at least 64 ounces of water a day to help remain hydrated  Please call with any questions or concerns  Office number is [phone]        History of Present Illness: We had the pleasure of evaluating Jona Louise in neurological consultation today for headaches  As you know,  she is a 44 y o  right handed  female  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 Daily headache: daily  Severe headache: daily    What time of the day do the headaches start?    Daily pain: wakes up with them  Severe headache: there all the time    How long do the headaches last?   Daily headache:  7-8/10  Severe headache: 8 to 10/10 - 10/10 for for few days    Are you ever headache free? Few days a month with no pain at time    Aura/Warning and how long does it last - blurry vision - within half an hour    Describe your usual headache -   Daily pain: Throbbing  Severe pain: Stabbing    Where is your headache located? Daily headache: anywhere in the head  Severe headache: left frontal and parietal region    What is the intensity of pain? Daily headaches: 7-8/10  Severe headaches:     Associated symptoms:   - Decrease of appetite, nausea, vomiting, diarrhea  - Insomnia  - Photophobia, phonophobia, sensitivity to smell   - Problem with concentration  - Blurred vision,   - Pupil dilated on the right  - Hands or feet tingle or feel numb  -  flushing of face  - Lacrimation, runny or stuffed-up nose,   - light-headed or dizzy, stiff or sore neck  - prefer to be alone and in a dark room, unable to work    Number of days missed per month because of headaches:  Work (or school) days: has not worked since November  Social or Family activities: often    Headache are worse if the patient: cough, sneeze, bending over  Headache triggers:  Not sure  What time of the year do headaches occur more frequently? Not sure    Have you seen someone else for headaches or pain? Yes  Have you had trigger point injection performed and how often? No  Have you had Botox injection performed and how often? No   Have you had epidural injections or transforaminal injections performed? Yes    What medications do you take or have you taken for your headaches? PREVENTATIVE:  - melatonin, multivitamin, B12, biotin, vitamin-D  - Depakote, gabapentin  -amitriptyline (gain weight on it) venlafaxine  ABORTIVE:  - methylprednisolone,  dexamethasone  - ketorolac  - Fioricet (stopped in 2015)  - Excedrin, Aleve, ibuprofen    Neck pain/cervical dystonia:    When did the neck pain start? yes  Does your neck pain cause you to have headaches? yes  At what time of the day is your neck pain:  - Worst: in am and pm  - Best: maybe during the day  Is your neck pain associated with: dizziness,  What aggravates neck pain? lifting, turning head to left or right  What alleviates neck pain? Nothing at this time  Head tilt: to the left and prominent sternocleidomastoid muscles  Has tried physical therapy:  Which may neck pain much worse  EM2018: This is an abnormal study  There is electrophysiological evidence consistent with an active subacute to chronic left C5/C6 radiculopathy  There was no electrophysiological evidence of median or ulnar entrapment neuropathy, or brachial plexopathy in the left upper extremity     Sleep Habit  Is your sleep restful? no  How often do you get up at night? 3-4 times due to pain  Do you wake up with headaches? yes  Do you snore while asleep? no  Have you been told that you stop breathing during sleeping?    no  Do you wake up tired in the morning? yes  Do you take frequent naps during the day? no  Do you have jaw pain? yes  Do you grind/clench your teeth at night? yes  Do you have restless leg syndrome? no  Do you have nightmare or sleep walk? no    Alternative therapies used in the past for headaches? physical therapy  Have you used CBD or THC for your headaches and how often? No  Are you current pregnant or planning on getting pregnant? No  Have you ever had any Brain imaging? yes   I personally reviewed these images      Past Medical History:   Diagnosis Date    Depression     Migraine        Patient Active Problem List   Diagnosis    Vitamin D deficiency    Chronic migraine without aura without status migrainosus, not intractable    Left arm weakness    Anxiety and depression    Cervical radiculopathy    Trigeminal autonomic cephalgias       Medications:      Current Outpatient Medications   Medication Sig Dispense Refill    cyanocobalamin (VITAMIN B-12) 100 mcg tablet Take 100 mcg by mouth daily      ergocalciferol (VITAMIN D2) 50,000 units Take 1 capsule (50,000 Units total) by mouth once a week For 6 weeks 6 capsule 0    gabapentin (NEURONTIN) 100 mg capsule Take 1 capsule (100 mg total) by mouth 3 (three) times a day as needed (stabbing headache) 90 capsule 3    MELATONIN PO Take 1 tablet by mouth daily      multivitamin (THERAGRAN) TABS Take 1 tablet by mouth daily       TRANSDERM-SCOP, 1 5 MG, 1 MG/3DAYS TD 72 hr patch APPLY ONE EVERY THREE DAYS AS NEEDED  2    venlafaxine (EFFEXOR-XR) 37 5 mg 24 hr capsule 1 tab PO daily for 2 weeks, if tolerated increase to 2 tabs PO daily (Patient taking differently: Take 75 mg by mouth daily 1 tab PO daily for 2 weeks, if tolerated increase to 2 tabs PO daily) 60 capsule 1    cyclobenzaprine (FLEXERIL) 10 mg tablet 1 tab at bedtime for 10 days then 1 tab twice a day after that 60 tablet 3    magnesium oxide (MAG-OX) 400 mg 1 tab in a m  Daily 30 tablet 6    OLANZapine (ZyPREXA) 5 mg tablet 1 at bedtime daily 30 tablet 6    prochlorperazine (COMPAZINE) 10 mg tablet 1 tab at onset of migraine, can repeat in 8 hours, can take with triptan/NSAID 20 tablet 3    Riboflavin ([employer]S VITAMIN B-2) 100 MG TABS 2 tabs in a m  2 tabs in p m  120 tablet 6    rizatriptan (MAXALT) 10 MG tablet 1 at onset of a migraine headache May repeat every 2 hours if needed, max 20mg/24 hours 12 tablet 3    topiramate (TOPAMAX) 25 mg tablet 1 tab at bedtime for 3 days then 2 tabs at bedtime for 3 days then 3 tabs at bedtime for 3 days then 4 tabs after that 120 tablet 2     No current facility-administered medications for this visit           Allergies:    No Known Allergies    Family History:     Family History   Problem Relation Age of Onset    No Known Problems Sister     No Known Problems Son     No Known Problems Daughter        Social History:     Social History     Socioeconomic History    Marital status: /Civil Union     Spouse name: Not on file    Number of children: Not on file    Years of education: Not on file    Highest education level: Not on file   Occupational History    Occupation:    Social Needs    Financial resource strain: Not on file    Food insecurity:     Worry: Not on file     Inability: Not on file   Namely needs:     Medical: Not on file     Non-medical: Not on file   Tobacco Use    Smoking status: Never Smoker    Smokeless tobacco: Never Used   Substance and Sexual Activity    Alcohol use: Yes     Frequency: 2-4 times a month     Comment: Socially     Drug use: No    Sexual activity: Not on file   Lifestyle    Physical activity:     Days per week: Not on file     Minutes per session: Not on file    Stress: Not on file   Relationships    Social connections:     Talks on phone: Not on file     Gets together: Not on file     Attends Jain service: Not on file     Active member of club or organization: Not on file     Attends meetings of clubs or organizations: Not on file     Relationship status: Not on file    Intimate partner violence:     Fear of current or ex partner: Not on file     Emotionally abused: Not on file     Physically abused: Not on file     Forced sexual activity: Not on file   Other Topics Concern    Not on file   Social History Narrative    Pt lives with daughter         Objective:   Physical Exam:                                                                   Vitals:               /76 (BP Location: Left arm, Patient Position: Sitting, Cuff Size: Standard)   Pulse 88   Ht 5' 5" (1 651 m)   Wt 82 6 kg (182 lb 3 2 oz)   BMI 30 32 kg/m²   BP Readings from Last 3 Encounters:   03/06/19 110/76   02/26/19 116/79   01/28/19 119/84     Pulse Readings from Last 3 Encounters:   03/06/19 88   02/26/19 74   01/28/19 88                CONSTITUTIONAL: Well developed, well nourished, well groomed  No dysmorphic features  Eyes:  PERRLA, EOM normal      Neck:  Normal ROM, neck supple  HEENT:  Normocephalic atraumatic  No meningismus      Oropharynx is clear and moist  No oral mucosal lesions  Chest:  Respirations regular and unlabored  Cardiovascular:  Distal extremities warm without palpable edema or tenderness, no observed significant swelling  Musculoskeletal:  Full range of motion  Skin:  warm and dry   Psychiatric:  Normal behavior and appropriate affect        Neurological Examination:   Mental status/cognitive function: Orientated to time, place and person  Cranial Nerves: 2 to 12 intact  Motor Exam:  5/5 upper and lower extremity  Sensory: grossly intact light touch in all extremities  Reflexes: 2/4 throughout  Coordination: Finger nose finger intact bilaterally, no tremor noted  Gait: steady casual and tandem gait  Romberg Negative  Review of Systems:   Review of Systems   Constitutional: Positive for appetite change, fatigue and fever  HENT: Negative  Eyes: Positive for photophobia and pain  Blurred Vision    Respiratory: Negative  Cardiovascular: Positive for chest pain  Gastrointestinal: Positive for nausea and vomiting  Endocrine: Negative  Genitourinary: Negative  Musculoskeletal: Positive for neck pain and neck stiffness  Skin: Negative  Allergic/Immunologic: Negative  Neurological: Positive for dizziness, weakness, light-headedness, numbness and headaches  Hematological: Bruises/bleeds easily  Psychiatric/Behavioral: Positive for decreased concentration and sleep disturbance (Trouble falling and staying asleep )  I personally reviewed and updated the ROS that was entered by the medical assistant       I have spent 60 minutes with Patient  today in which greater than 50% of this time was spent in counseling/coordination of care regarding Diagnostic results, Prognosis, Risks and benefits of tx options, Intructions for management, Patient and family education, Importance of tx compliance, Risk factor reductions, Impressions and plan of care        Author:  Hitesh Vargas MD 3/6/2019 10:01 AM

## 2019-03-06 ENCOUNTER — TELEPHONE (OUTPATIENT)
Dept: NEUROLOGY | Facility: CLINIC | Age: 44
End: 2019-03-06

## 2019-03-06 ENCOUNTER — OFFICE VISIT (OUTPATIENT)
Dept: NEUROLOGY | Facility: CLINIC | Age: 44
End: 2019-03-06
Payer: COMMERCIAL

## 2019-03-06 VITALS
BODY MASS INDEX: 30.35 KG/M2 | DIASTOLIC BLOOD PRESSURE: 76 MMHG | HEART RATE: 88 BPM | SYSTOLIC BLOOD PRESSURE: 110 MMHG | WEIGHT: 182.2 LBS | HEIGHT: 65 IN

## 2019-03-06 DIAGNOSIS — G44.099 TRIGEMINAL AUTONOMIC CEPHALGIAS: ICD-10-CM

## 2019-03-06 DIAGNOSIS — G43.709 CHRONIC MIGRAINE WITHOUT AURA WITHOUT STATUS MIGRAINOSUS, NOT INTRACTABLE: Primary | ICD-10-CM

## 2019-03-06 DIAGNOSIS — G44.229 CHRONIC TENSION-TYPE HEADACHE, NOT INTRACTABLE: ICD-10-CM

## 2019-03-06 DIAGNOSIS — M54.12 CERVICAL RADICULOPATHY: ICD-10-CM

## 2019-03-06 PROBLEM — R51.9 CHRONIC DAILY HEADACHE: Status: RESOLVED | Noted: 2018-12-17 | Resolved: 2019-03-06

## 2019-03-06 PROBLEM — R20.2 PARESTHESIA: Status: RESOLVED | Noted: 2018-12-17 | Resolved: 2019-03-06

## 2019-03-06 PROCEDURE — 99215 OFFICE O/P EST HI 40 MIN: CPT | Performed by: PSYCHIATRY & NEUROLOGY

## 2019-03-06 RX ORDER — OLANZAPINE 5 MG/1
TABLET ORAL
Qty: 30 TABLET | Refills: 6 | Status: SHIPPED | OUTPATIENT
Start: 2019-03-06 | End: 2019-03-26

## 2019-03-06 RX ORDER — TOPIRAMATE 25 MG/1
TABLET ORAL
Qty: 120 TABLET | Refills: 2 | Status: SHIPPED | OUTPATIENT
Start: 2019-03-06 | End: 2020-04-22

## 2019-03-06 RX ORDER — UBIDECARENONE 75 MG
100 CAPSULE ORAL DAILY
COMMUNITY
End: 2020-04-22

## 2019-03-06 RX ORDER — RIZATRIPTAN BENZOATE 10 MG/1
TABLET ORAL
Qty: 12 TABLET | Refills: 3 | Status: SHIPPED | OUTPATIENT
Start: 2019-03-06 | End: 2020-04-22 | Stop reason: SDUPTHER

## 2019-03-06 RX ORDER — PROCHLORPERAZINE MALEATE 10 MG
TABLET ORAL
Qty: 20 TABLET | Refills: 3 | Status: SHIPPED | OUTPATIENT
Start: 2019-03-06 | End: 2020-04-22 | Stop reason: SDUPTHER

## 2019-03-06 RX ORDER — CYCLOBENZAPRINE HCL 10 MG
TABLET ORAL
Qty: 60 TABLET | Refills: 3 | Status: SHIPPED | OUTPATIENT
Start: 2019-03-06 | End: 2020-04-22

## 2019-03-06 NOTE — TELEPHONE ENCOUNTER
Called patient and left a message to call back to discuss scheduling infusion  I want to see which infusion center she would like to use before I call to check for availability

## 2019-03-06 NOTE — TELEPHONE ENCOUNTER
Patient called back and said that the [facility] works best for her but she can also do the [facility] infusion center as well

## 2019-03-06 NOTE — PATIENT INSTRUCTIONS
Diagnosis: Suspect patient has  Chronic migraine headaches  Suspect trigeminal autonomic cephalgia  Chronic cervicalgia with cervical dystonia  Left cervical radiculopathy at C5-C6      Preventive therapy for headaches:   -Over-the-counter supplements: to decrease intensity and frequency of migraines  - Magnesium Oxide 400mg a day  If any diarrhea or upset stomach, decrease dose  as tolerated  -  B2 200 mg twice a day  This supplement will change the color of the urine to fluorescent yellow no matter how hydrated, which is normal    - Topamax:  1 tab at bedtime for 3 days then 2 tabs at bedtime for 3 days then 3 tabs at bedtime for 3 days then 4 tabs after that  - olanzapine 5 mg at bedtime daily  - will apply for Botox  Abortive therapy for headaches:   - at the onset of a severe headache patient is to take Maxalt (rizatriptan) 10 mg and Compazine (prochlorperazine) 10 mg  If headache persists she may take her rizatriptan again 10 mg but this time with Benadryl 50 mg     Neck pain/cervical dystonia mild:   - has tries physical therapy which made symptoms worse  - currently seeing Pain Management and had an epidural injection  - will start patient on Flexeril 10 mg at bedtime for 10 days then increase up to 10 mg twice a day     Headache management instructions  - When patient has a moderate to severe headache, they should seek rest, initiate relaxation and apply cold compresses to the head  - Maintain regular sleep schedule  Adults need at least 7-8 hours of uninterrupted a night  - Limit over the counter medications such as Tylenol, Ibuprofen, Aleve, Excedrin  (No more than 3 times a week)  - Maintain headache diary  We discussed an NOEL for a smart phone is "Migraine juan ramon"  - Limit caffeine to 1-2 cups 8 to 16 oz a day or less  - Avoid dietary trigger  (aged cheese, peanuts, MSG, aspartame and nitrates)  - Patient is to have regular frequent meals to prevent headache onset      - Please drink at least 64 ounces of water a day to help remain hydrated  Please call with any questions or concerns   Office number is [phone]

## 2019-03-07 NOTE — TELEPHONE ENCOUNTER
Patient called back and I informed her of her appointments and I told her I would mail her the address and her appointment dates and times

## 2019-03-08 ENCOUNTER — TELEPHONE (OUTPATIENT)
Dept: PAIN MEDICINE | Facility: MEDICAL CENTER | Age: 44
End: 2019-03-08

## 2019-03-08 NOTE — TELEPHONE ENCOUNTER
SW patient, states she had Left HUGH on 2/26/19, patient is still having pain (pins/needles) with no relief since the procedure  Patient said now the right arm to elbow is bruised and swollen with weakness noted and having pain  Patient is aware it can take several weeks for the injection to work  Patient was seen by her Neuro physician today and started on a new medication, but she can't remember the name of it and hasn't started taking it yet  +Patient's main concern is could the injection on her left side be related to the pain she is experiencing on her right side? Patient is asking if she should schedule the right side for HUGH? Patient said she has stayed away from tylenol, aleve and advil because she is on so many other medications  Advised patient to use ice / heat to the area and will c/b with JW recommendations

## 2019-03-08 NOTE — TELEPHONE ENCOUNTER
It can take up to 2 weeks for the patient to notice full effect of injection  Although we did focus the medication off to the left, it is a liquid steroid and will also likely provide relief for the right sided symptoms as well  If the arm just feels bruised and swollen, this can be common for postprocedural pain, however if the arm is physically bruised and swollen, the patient should be evaluated  Also, if the patient had any trauma to the arm and it is bruised and swollen she should have it evaluated to ensure nothing else is going on  Agree with recommendations given by RN  I did review the neurologist's note and did not see any mention of physical bruising or swelling to the upper extremity    We will see how the patient does over the next week to determine what next step in treatment will be

## 2019-03-08 NOTE — TELEPHONE ENCOUNTER
Pt is calling stating that she is having bruising and swelling on her  right elbow with pain that goes up her neck as well  Pt is not sure if this is from the injection or something else but wanted to let the doctor know         Pt can be reached at [phone]

## 2019-03-11 NOTE — TELEPHONE ENCOUNTER
S/w pt, reviewed below  She said the bruising/swelling in her elbow really is not as concerning as the pin/needles and numbness feeling she is having in all her limbs  Pt said she has always had this in her L arm but now its in all her limbs  Pt said her joints are all very sore as well  She said she feels very weak  Pt denies doing anything to aggravate it  RN informed pt that she may have some inflammation of the nerves and I will forward to JW to see if he has any recommendations  Pt has been using ice/heat

## 2019-03-11 NOTE — TELEPHONE ENCOUNTER
Pt left a voicemail today @ 11:45a stating that she is returning a call to the nurse but would also like to mention that she has been feeling pins and needles all weekend in all her limbs mainly in her right arm and left leg/foot   Pt can be reached at [phone]

## 2019-03-11 NOTE — TELEPHONE ENCOUNTER
No further recommendations at this time  The patient is to inform us if there is any signs or symptoms of infection including bleeding, infection, fever, chills, or drainage from the site

## 2019-03-15 RX ORDER — DIPHENHYDRAMINE HCL 25 MG
50 TABLET ORAL ONCE
Status: COMPLETED | OUTPATIENT
Start: 2019-03-18 | End: 2019-03-18

## 2019-03-15 RX ORDER — METHOCARBAMOL 500 MG/1
1000 TABLET, FILM COATED ORAL ONCE
Status: COMPLETED | OUTPATIENT
Start: 2019-03-18 | End: 2019-03-18

## 2019-03-15 RX ORDER — SODIUM CHLORIDE 9 MG/ML
20 INJECTION, SOLUTION INTRAVENOUS CONTINUOUS
Status: DISCONTINUED | OUTPATIENT
Start: 2019-03-18 | End: 2019-03-21 | Stop reason: HOSPADM

## 2019-03-18 ENCOUNTER — HOSPITAL ENCOUNTER (OUTPATIENT)
Dept: INFUSION CENTER | Facility: CLINIC | Age: 44
Discharge: HOME | End: 2019-03-18
Payer: COMMERCIAL

## 2019-03-18 VITALS
HEART RATE: 79 BPM | SYSTOLIC BLOOD PRESSURE: 117 MMHG | RESPIRATION RATE: 18 BRPM | DIASTOLIC BLOOD PRESSURE: 84 MMHG | TEMPERATURE: 97.7 F

## 2019-03-18 PROCEDURE — 96367 TX/PROPH/DG ADDL SEQ IV INF: CPT

## 2019-03-18 PROCEDURE — 96366 THER/PROPH/DIAG IV INF ADDON: CPT

## 2019-03-18 PROCEDURE — 96375 TX/PRO/DX INJ NEW DRUG ADDON: CPT

## 2019-03-18 PROCEDURE — 96365 THER/PROPH/DIAG IV INF INIT: CPT

## 2019-03-18 RX ORDER — DIPHENHYDRAMINE HCL 25 MG
50 TABLET ORAL ONCE
Status: COMPLETED | OUTPATIENT
Start: 2019-03-19 | End: 2019-03-19

## 2019-03-18 RX ORDER — LORAZEPAM 2 MG/ML
1 INJECTION INTRAMUSCULAR EVERY 6 HOURS PRN
Status: DISCONTINUED | OUTPATIENT
Start: 2019-03-18 | End: 2019-03-19 | Stop reason: HOSPADM

## 2019-03-18 RX ORDER — SODIUM CHLORIDE 9 MG/ML
20 INJECTION, SOLUTION INTRAVENOUS CONTINUOUS
Status: DISCONTINUED | OUTPATIENT
Start: 2019-03-19 | End: 2019-03-22 | Stop reason: HOSPADM

## 2019-03-18 RX ORDER — METHOCARBAMOL 500 MG/1
1000 TABLET, FILM COATED ORAL ONCE
Status: COMPLETED | OUTPATIENT
Start: 2019-03-19 | End: 2019-03-19

## 2019-03-18 RX ADMIN — MAGNESIUM SULFATE HEPTAHYDRATE: 500 INJECTION, SOLUTION INTRAMUSCULAR; INTRAVENOUS at 12:25

## 2019-03-18 RX ADMIN — SODIUM CHLORIDE 1000 MG: 0.9 INJECTION, SOLUTION INTRAVENOUS at 09:44

## 2019-03-18 RX ADMIN — ONDANSETRON 4 MG: 2 INJECTION, SOLUTION INTRAMUSCULAR; INTRAVENOUS at 10:26

## 2019-03-18 RX ADMIN — VALPROATE SODIUM 1000 MG: 100 INJECTION, SOLUTION INTRAVENOUS at 11:56

## 2019-03-18 RX ADMIN — METHOCARBAMOL 1000 MG: 500 TABLET ORAL at 08:16

## 2019-03-18 RX ADMIN — FAMOTIDINE 20 MG: 10 INJECTION INTRAVENOUS at 09:01

## 2019-03-18 RX ADMIN — LORAZEPAM 1 MG: 2 INJECTION INTRAMUSCULAR; INTRAVENOUS at 12:40

## 2019-03-18 RX ADMIN — DIHYDROERGOTAMINE MESYLATE: 1 INJECTION, SOLUTION INTRAMUSCULAR; INTRAVENOUS; SUBCUTANEOUS at 10:53

## 2019-03-18 RX ADMIN — DIPHENHYDRAMINE HCL 50 MG: 25 TABLET, FILM COATED ORAL at 08:16

## 2019-03-18 RX ADMIN — SODIUM CHLORIDE 20 ML/HR: 0.9 INJECTION, SOLUTION INTRAVENOUS at 08:55

## 2019-03-18 NOTE — PLAN OF CARE
Problem: PAIN - ADULT  Goal: Verbalizes/displays adequate comfort level or baseline comfort level  Description  Interventions:  - Encourage patient to monitor pain and request assistance  - Assess pain using appropriate pain scale  - Administer analgesics based on type and severity of pain and evaluate response  - Implement non-pharmacological measures as appropriate and evaluate response  - Consider cultural and social influences on pain and pain management  - Notify physician/advanced practitioner if interventions unsuccessful or patient reports new pain  Outcome: Progressing     Problem: INFECTION - ADULT  Goal: Absence or prevention of progression during hospitalization  Description  INTERVENTIONS:  - Assess and monitor for signs and symptoms of infection  - Monitor lab/diagnostic results  - Monitor all insertion sites, i e  indwelling lines, tubes, and drains  - Monitor endotracheal (as able) and nasal secretions for changes in amount and color  - [place] appropriate cooling/warming therapies per order  - Administer medications as ordered  - Instruct and encourage patient and family to use good hand hygiene technique  - Identify and instruct in appropriate isolation precautions for identified infection/condition  Outcome: Progressing  Goal: Absence of fever/infection during neutropenic period  Description  INTERVENTIONS:  - Monitor WBC  - Implement neutropenic guidelines  Outcome: Progressing     Problem: Knowledge Deficit  Goal: Patient/family/caregiver demonstrates understanding of disease process, treatment plan, medications, and discharge instructions  Description  Complete learning assessment and assess knowledge base    Interventions:  - Provide teaching at level of understanding  - Provide teaching via preferred learning methods  Outcome: Progressing

## 2019-03-18 NOTE — PROGRESS NOTES
Pt  Tolerated infusion without adverse event  Lorazepam given IV at 1240 for anxiety and restlessness  Pt  Verbalized peripheral symptoms that she typically gets with Migraines  Pt  Did not feel pain medication was needed  Pt  Noted sleeping post Lorazepam and stated she did feel much more relaxed after nap    Pt  States headache remained a 5/10 on numerical pain scale, but was not a migraine headache

## 2019-03-18 NOTE — PROGRESS NOTES
Pt  Denies new symptoms or concerns at this time  Pt  Has not taken Maxalt for at least 1 week, Headache cocktail ordered for infusion today and daily through 3/22/19  Pt  Verbalizes headache pain 5/10, but denied need for pain medication at this time

## 2019-03-19 ENCOUNTER — HOSPITAL ENCOUNTER (OUTPATIENT)
Dept: INFUSION CENTER | Facility: CLINIC | Age: 44
Discharge: HOME | End: 2019-03-19
Payer: COMMERCIAL

## 2019-03-19 VITALS
TEMPERATURE: 97.8 F | SYSTOLIC BLOOD PRESSURE: 124 MMHG | DIASTOLIC BLOOD PRESSURE: 82 MMHG | RESPIRATION RATE: 16 BRPM | HEART RATE: 95 BPM

## 2019-03-19 PROCEDURE — 96366 THER/PROPH/DIAG IV INF ADDON: CPT

## 2019-03-19 PROCEDURE — 96367 TX/PROPH/DG ADDL SEQ IV INF: CPT

## 2019-03-19 PROCEDURE — 96365 THER/PROPH/DIAG IV INF INIT: CPT

## 2019-03-19 RX ORDER — SODIUM CHLORIDE 9 MG/ML
20 INJECTION, SOLUTION INTRAVENOUS CONTINUOUS
Status: DISCONTINUED | OUTPATIENT
Start: 2019-03-20 | End: 2019-03-23 | Stop reason: HOSPADM

## 2019-03-19 RX ORDER — METHOCARBAMOL 500 MG/1
1000 TABLET, FILM COATED ORAL ONCE
Status: COMPLETED | OUTPATIENT
Start: 2019-03-20 | End: 2019-03-20

## 2019-03-19 RX ORDER — DIPHENHYDRAMINE HCL 25 MG
50 TABLET ORAL ONCE
Status: COMPLETED | OUTPATIENT
Start: 2019-03-20 | End: 2019-03-20

## 2019-03-19 RX ADMIN — SODIUM CHLORIDE 20 ML/HR: 0.9 INJECTION, SOLUTION INTRAVENOUS at 08:58

## 2019-03-19 RX ADMIN — METHOCARBAMOL 1000 MG: 500 TABLET ORAL at 09:03

## 2019-03-19 RX ADMIN — SODIUM CHLORIDE 1000 MG: 0.9 INJECTION, SOLUTION INTRAVENOUS at 09:39

## 2019-03-19 RX ADMIN — FAMOTIDINE 20 MG: 10 INJECTION INTRAVENOUS at 09:04

## 2019-03-19 RX ADMIN — MAGNESIUM SULFATE HEPTAHYDRATE: 500 INJECTION, SOLUTION INTRAMUSCULAR; INTRAVENOUS at 12:28

## 2019-03-19 RX ADMIN — ONDANSETRON 4 MG: 2 INJECTION, SOLUTION INTRAMUSCULAR; INTRAVENOUS at 10:26

## 2019-03-19 RX ADMIN — DIHYDROERGOTAMINE MESYLATE: 1 INJECTION, SOLUTION INTRAMUSCULAR; INTRAVENOUS; SUBCUTANEOUS at 11:00

## 2019-03-19 RX ADMIN — VALPROATE SODIUM 1000 MG: 100 INJECTION, SOLUTION INTRAVENOUS at 11:56

## 2019-03-19 RX ADMIN — DIPHENHYDRAMINE HCL 50 MG: 25 TABLET ORAL at 08:55

## 2019-03-19 NOTE — PROGRESS NOTES
Patient arrived on unit for Day #2 of headache infusion  Denies taking any triptans within last 24 hours  Headache pain on admission was a "4 throbbing pain" Denies any nausea/vomiting/dizziness  Infusion initiated

## 2019-03-19 NOTE — PLAN OF CARE
Problem: Potential for Falls  Goal: Patient will remain free of falls  Description  INTERVENTIONS:  - Assess patient frequently for physical needs  -  Identify cognitive and physical deficits and behaviors that affect risk of falls    -  [place] fall precautions as indicated by assessment   - Educate patient/family on patient safety including physical limitations  - Instruct patient to call for assistance with activity based on assessment  - Modify environment to reduce risk of injury  - Consider OT/PT consult to assist with strengthening/mobility  Outcome: Progressing

## 2019-03-19 NOTE — PROGRESS NOTES
Patient tolerated infusion  Patient stated her headache pain is at a "5"  Denies any nausea/vomiting/dizziness  Aware to return tomorrow for Day #3 of infusion  AVS given to patient   Family member drove home

## 2019-03-20 ENCOUNTER — HOSPITAL ENCOUNTER (OUTPATIENT)
Dept: INFUSION CENTER | Facility: CLINIC | Age: 44
Discharge: HOME | End: 2019-03-20
Payer: COMMERCIAL

## 2019-03-20 VITALS
SYSTOLIC BLOOD PRESSURE: 115 MMHG | DIASTOLIC BLOOD PRESSURE: 82 MMHG | RESPIRATION RATE: 16 BRPM | HEART RATE: 52 BPM | TEMPERATURE: 97.8 F

## 2019-03-20 PROCEDURE — 96366 THER/PROPH/DIAG IV INF ADDON: CPT

## 2019-03-20 PROCEDURE — 96365 THER/PROPH/DIAG IV INF INIT: CPT

## 2019-03-20 PROCEDURE — 96367 TX/PROPH/DG ADDL SEQ IV INF: CPT

## 2019-03-20 RX ORDER — DIPHENHYDRAMINE HCL 25 MG
50 TABLET ORAL ONCE
Status: COMPLETED | OUTPATIENT
Start: 2019-03-21 | End: 2019-03-21

## 2019-03-20 RX ORDER — METHOCARBAMOL 500 MG/1
1000 TABLET, FILM COATED ORAL ONCE
Status: COMPLETED | OUTPATIENT
Start: 2019-03-21 | End: 2019-03-21

## 2019-03-20 RX ORDER — SODIUM CHLORIDE 9 MG/ML
20 INJECTION, SOLUTION INTRAVENOUS CONTINUOUS
Status: DISCONTINUED | OUTPATIENT
Start: 2019-03-21 | End: 2019-03-24 | Stop reason: HOSPADM

## 2019-03-20 RX ADMIN — VALPROATE SODIUM 1000 MG: 100 INJECTION, SOLUTION INTRAVENOUS at 11:29

## 2019-03-20 RX ADMIN — MAGNESIUM SULFATE HEPTAHYDRATE: 500 INJECTION, SOLUTION INTRAMUSCULAR; INTRAVENOUS at 12:19

## 2019-03-20 RX ADMIN — SODIUM CHLORIDE: 9 INJECTION, SOLUTION INTRAVENOUS at 10:30

## 2019-03-20 RX ADMIN — SODIUM CHLORIDE 20 ML/HR: 0.9 INJECTION, SOLUTION INTRAVENOUS at 08:23

## 2019-03-20 RX ADMIN — FAMOTIDINE 20 MG: 10 INJECTION INTRAVENOUS at 08:44

## 2019-03-20 RX ADMIN — SODIUM CHLORIDE 500 MG: 0.9 INJECTION, SOLUTION INTRAVENOUS at 09:09

## 2019-03-20 RX ADMIN — ONDANSETRON 4 MG: 2 INJECTION, SOLUTION INTRAMUSCULAR; INTRAVENOUS at 09:56

## 2019-03-20 RX ADMIN — METHOCARBAMOL 1000 MG: 500 TABLET ORAL at 08:43

## 2019-03-20 RX ADMIN — DIPHENHYDRAMINE HCL 50 MG: 25 TABLET ORAL at 08:28

## 2019-03-20 NOTE — PLAN OF CARE
Problem: Potential for Falls  Goal: Patient will remain free of falls  Description  INTERVENTIONS:  - Assess patient frequently for physical needs  -  Identify cognitive and physical deficits and behaviors that affect risk of falls    -  [place] fall precautions as indicated by assessment   - Educate patient/family on patient safety including physical limitations  - Instruct patient to call for assistance with activity based on assessment  - Modify environment to reduce risk of injury  - Consider OT/PT consult to assist with strengthening/mobility  Outcome: Progressing

## 2019-03-20 NOTE — PROGRESS NOTES
After the DHE, patient stated she felt a tightness around head, headache pain increased to a "6", and chest tightness  [id number]  O2 sat 98% on RA  Offered pain med but patient refused at present time  Depacon hung as ordered   Will monitor

## 2019-03-20 NOTE — PROGRESS NOTES
Patient completed infusion  Patient continues to have  a tight feeling in hands and legs  Headache pain at a "6 throbbing type pain"  VS 97 8-52-/82  Pulse  OX 97% on RA  Spoke with Dr Edgardo Stallworth and made aware of patient's symptoms after DHE  Dr Edgardo Stallworth stated that she will decrease DHE dose tomorrow  Patient made aware  Encouraged patient to call Dr Edgardo Stallworth if any issues after leaving infusion  Patient verbalized understanding  AVS given to patient  Aware to return tomorrow   Mother driving home

## 2019-03-20 NOTE — PROGRESS NOTES
Patient arrived on unit for Day #3 of infusion  Stated she didn't sleep well last night  Headache pain at a "4 throbbing on top of head"  Denies any nausea/vomiting/dizziness   Infusion initiated

## 2019-03-21 ENCOUNTER — HOSPITAL ENCOUNTER (OUTPATIENT)
Dept: INFUSION CENTER | Facility: CLINIC | Age: 44
Discharge: HOME | End: 2019-03-21
Payer: COMMERCIAL

## 2019-03-21 VITALS
DIASTOLIC BLOOD PRESSURE: 82 MMHG | HEART RATE: 70 BPM | TEMPERATURE: 98.2 F | SYSTOLIC BLOOD PRESSURE: 119 MMHG | RESPIRATION RATE: 18 BRPM

## 2019-03-21 RX ADMIN — DIPHENHYDRAMINE HCL 50 MG: 25 TABLET ORAL at 08:35

## 2019-03-21 RX ADMIN — METHOCARBAMOL 1000 MG: 500 TABLET ORAL at 08:35

## 2019-03-21 RX ADMIN — SODIUM CHLORIDE 20 ML/HR: 0.9 INJECTION, SOLUTION INTRAVENOUS at 08:42

## 2019-03-21 NOTE — PLAN OF CARE
Problem: Potential for Falls  Goal: Patient will remain free of falls  Description  INTERVENTIONS:  - Assess patient frequently for physical needs  -  Identify cognitive and physical deficits and behaviors that affect risk of falls    -  Brady fall precautions as indicated by assessment   - Educate patient/family on patient safety including physical limitations  - Instruct patient to call for assistance with activity based on assessment  - Modify environment to reduce risk of injury  - Consider OT/PT consult to assist with strengthening/mobility  Outcome: Progressing

## 2019-03-21 NOTE — PROGRESS NOTES
Presented today for headache infusion  Multiple IV sticks attempted unsuccessfully by 2 nurses  Dr Jai Pappas notified of inability to obtain access  She will call the patient at home this afternoon and treat with po meds  Patient agreeble to same   AVS given and discussed

## 2019-03-22 ENCOUNTER — TELEPHONE (OUTPATIENT)
Dept: NEUROLOGY | Facility: CLINIC | Age: 44
End: 2019-03-22

## 2019-03-22 DIAGNOSIS — G43.709 CHRONIC MIGRAINE WITHOUT AURA WITHOUT STATUS MIGRAINOSUS, NOT INTRACTABLE: Primary | ICD-10-CM

## 2019-03-22 RX ORDER — KETOROLAC TROMETHAMINE 10 MG/1
TABLET, FILM COATED ORAL
Qty: 6 TABLET | Refills: 3 | Status: SHIPPED | OUTPATIENT
Start: 2019-03-22 | End: 2020-04-22

## 2019-03-22 NOTE — TELEPHONE ENCOUNTER
Pt called back in regarding this, upset that she has not heard anything back yet, states she would like to get an answer before the weekend  I did call over, [facility] answered and stated she was going to bring this to your attention

## 2019-03-22 NOTE — TELEPHONE ENCOUNTER
I spoke with pt regarding records request  I explained that per 2/19 encounter, request for office notes (after 11/15/18) was faxed to [facility]  States that [facility] [employer] has not received notes  I advised pt that we can fax over notes ASAP but will need JAMIL on file as this was not included in [employer] company's request and we unfortunately do we have one signed by pt on file  I also advised her that I would call over to her  to make her aware and to also see if they have an JAMIL that can be faxed to us ASAP  [facility]  Cintia Rist [phone]  ext [phone] however received message that she is away from her desk  Her message did provide her direct fax # [phone]  I left a detailed message and requested she call us ASAP  I made pt aware  Pt will come to [place] office on this Monday to sign JAMIL as she does not acces to a fax at this time  Pt very appreciative  I will keep this encounter open so we can follow up on Monday

## 2019-03-22 NOTE — TELEPHONE ENCOUNTER
Pt called stated that she was not able to get her HA infusion yesterday bc they were not able to get an IV on her yesterday  She had 3 full days of infusion, and was notified that her infusion today was cancelled  States that she is not any better questioning recommendations?

## 2019-03-22 NOTE — TELEPHONE ENCOUNTER
Will have patient increase her olanzapine to 2 tabs at bedtime  For her headaches will have patient take Toradol 10 mg t i d  For 48 hours  Scripts sent out  Also discussed with patient that given patient is feeling short of breath if this continues patient should go to the ER to rule out pulmonary embolism  Patient also states that after her epidural she is feeling worse with weakness and pain will see her pain management on Tuesday  Inform patient that if she feels symptoms are worse she should go the ER for evaluation for this as well  Patient was to be put on short-term disability for her chronic symptoms  Patient states that her employer has not received this information and her short-term disability may be in jeopardy  Our office will contact patient to review this with her

## 2019-03-25 NOTE — TELEPHONE ENCOUNTER
JAMIL received and scanned into chart  Office notes faxed directly to   [employer] for CM to return call and confirm receipt  I did LM making pt aware to contact company and call us with any questions/concerns

## 2019-03-26 ENCOUNTER — TELEPHONE (OUTPATIENT)
Dept: NEUROLOGY | Facility: CLINIC | Age: 44
End: 2019-03-26

## 2019-03-26 ENCOUNTER — OFFICE VISIT (OUTPATIENT)
Dept: PAIN MEDICINE | Facility: CLINIC | Age: 44
End: 2019-03-26
Payer: COMMERCIAL

## 2019-03-26 VITALS
WEIGHT: 178 LBS | SYSTOLIC BLOOD PRESSURE: 130 MMHG | HEIGHT: 65 IN | BODY MASS INDEX: 29.66 KG/M2 | DIASTOLIC BLOOD PRESSURE: 83 MMHG

## 2019-03-26 DIAGNOSIS — M54.12 CERVICAL RADICULOPATHY: Primary | ICD-10-CM

## 2019-03-26 DIAGNOSIS — G43.709 CHRONIC MIGRAINE WITHOUT AURA WITHOUT STATUS MIGRAINOSUS, NOT INTRACTABLE: Primary | ICD-10-CM

## 2019-03-26 PROCEDURE — 99214 OFFICE O/P EST MOD 30 MIN: CPT | Performed by: NURSE PRACTITIONER

## 2019-03-26 RX ORDER — OLANZAPINE 5 MG/1
5 TABLET ORAL
COMMUNITY
End: 2020-04-22

## 2019-03-26 RX ORDER — GABAPENTIN 300 MG/1
300 CAPSULE ORAL 3 TIMES DAILY
Qty: 90 CAPSULE | Refills: 1 | Status: SHIPPED | OUTPATIENT
Start: 2019-03-26 | End: 2019-04-30 | Stop reason: SDUPTHER

## 2019-03-26 NOTE — TELEPHONE ENCOUNTER
Received a message form sol from [employer] stating that she did receive 3 faxes from our office

## 2019-03-26 NOTE — PATIENT INSTRUCTIONS
Gabapentin 300mg: take 1 capsule by mouth at bedtime x 1 week, then increase to 1 capsule twice a day x 1 week, then increase 1 capsule in the morning and 2 capsules at bedtime and CONTINUE  Call with any side effects or concerns

## 2019-03-26 NOTE — TELEPHONE ENCOUNTER
Referral Notes   Number of Notes: 1   Type Date User Summary Attachment   General 03/14/2019 10:42 AM Jignesh Kennedy care coordination  -   Note    Botox- authorization #: [id number] valid from 3/09/2019 until 03/08/2020  Please use [address]         Thank you!

## 2019-03-26 NOTE — PROGRESS NOTES
Assessment:  1  Cervical radiculopathy        Plan:  1  Patient has gabapentin on hand on an as needed basis for migraines, however states she is seeing neurology who has prescribed her other modalities for migraines  I will initiate the patient on gabapentin on a continual basis as this time to address the neuropathic component of her neck and upper extremity symptoms  She will initiate on 300mg QHS and titrate to 300mg TID  Patient was educated on medications most common side effects which include dizziness, drowsiness, and swelling of the hands and feet  Patient was instructed to call the office with any adverse medication side effects  The patient is also aware that if they would like to come off of the medication, they need to let us know as suddenly stopping the medication puts them at risk to have a seizure  2  I will schedule the patient for repeat cervical epidural steroid injection to address the inflammatory component of the patient's pain  She did get relief of neck pain, however did not receive much relief from bilateral upper extremity symptoms  Complete risks and benefits including bleeding, infection, tissue reaction, nerve injury and allergic reaction were discussed  The patient was agreeable and verbalized an understanding  3   Patient will continue to follow with neurology as scheduled  4  If patient fails conservative therapy, will consider neurosurgical consultation as the patient has evidence of left cervical radiculopathy on EMG  5   The patient will follow-up in after the procedure for medication prescription refill and reevaluation  The patient was advised to contact the office should their symptoms worsen in the interim  The patient was agreeable and verbalized an understanding  History of Present Illness:     The patient is a 44 y o  female with a history of migraines last seen on 1/28/19 who presents for a follow up office visit in regards to chronic neck pain that radiates into the bilateral upper extremities, left greater than right with associated numbness and tingling  The patient denies bowel or bladder incontinence  The patient is s/p cervical epidural steroid injection with Dr Kendrick Schulz on February 26, 2019 and reports mild relief of her neck pain, however continues with the neuropathic symptoms on the underside of her arms  She's also noticing numbness and tingling in her fingers and toes as well  MRI of the cervical and thoracic spine were unremarkable and did not show any compressive pathology or degenerative disease  EMG of the patient's upper extremities showed a chronic left C5-6 radiculopathy  Patient continues to follow with Neurology for chronic migraines  She has had a full rheumatology workup in the past which negative, however she does question whether she should have a Lyme titer redrawn  The patient currently rates her pain as 7/10 on the numeric pain rating scale  States the pain is constant in nature and follows no particular pattern throughout the day  She characterizes the pain as throbbing, shooting, numbness and pins and needles  The patient has tried various NSAIDs without relief  She has done PT without relief  She is on topamax, maxalt and tizanidine as prescribed by neurology without much relief at this time  I have personally reviewed and/or updated the patient's past medical history, past surgical history, family history, social history, current medications, allergies, and vital signs today  Review of Systems:    Review of Systems   Respiratory: Negative for shortness of breath  Cardiovascular: Negative for chest pain  Gastrointestinal: Negative for constipation, diarrhea, nausea and vomiting  Musculoskeletal: Negative for arthralgias, gait problem, joint swelling and myalgias  Skin: Negative for rash  Neurological: Negative for dizziness, seizures and weakness     All other systems reviewed and are negative  Past Medical History:   Diagnosis Date    Depression     Migraine        Past Surgical History:   Procedure Laterality Date    TUBAL LIGATION         Family History   Problem Relation Age of Onset    No Known Problems Sister     No Known Problems Son     No Known Problems Daughter        Social History     Occupational History    Occupation:    Tobacco Use    Smoking status: Never Smoker    Smokeless tobacco: Never Used   Substance and Sexual Activity    Alcohol use: Yes     Frequency: 2-4 times a month     Comment: Socially     Drug use: No    Sexual activity: Not on file         Current Outpatient Medications:     OLANZapine (ZYPREXA) 5 mg tablet, Take 5 mg by mouth daily at bedtime, Disp: , Rfl:     prochlorperazine (COMPAZINE) 10 mg tablet, 1 tab at onset of migraine, can repeat in 8 hours, can take with triptan/NSAID, Disp: 20 tablet, Rfl: 3    cyanocobalamin (VITAMIN B-12) 100 mcg tablet, Take 100 mcg by mouth daily, Disp: , Rfl:     cyclobenzaprine (FLEXERIL) 10 mg tablet, 1 tab at bedtime for 10 days then 1 tab twice a day after that, Disp: 60 tablet, Rfl: 3    ergocalciferol (VITAMIN D2) 50,000 units, Take 1 capsule (50,000 Units total) by mouth once a week For 6 weeks, Disp: 6 capsule, Rfl: 0    gabapentin (NEURONTIN) 300 mg capsule, Take 1 capsule (300 mg total) by mouth 3 (three) times a day, Disp: 90 capsule, Rfl: 1    ketorolac (TORADOL) 10 mg tablet, Three times a day for two days, Disp: 6 tablet, Rfl: 3    magnesium oxide (MAG-OX) 400 mg, 1 tab in a m   Daily, Disp: 30 tablet, Rfl: 6    multivitamin (THERAGRAN) TABS, Take 1 tablet by mouth daily , Disp: , Rfl:     Riboflavin (CVS VITAMIN B-2) 100 MG TABS, 2 tabs in a m  2 tabs in p m , Disp: 120 tablet, Rfl: 6    rizatriptan (MAXALT) 10 MG tablet, 1 at onset of a migraine headache May repeat every 2 hours if needed, max 20mg/24 hours, Disp: 12 tablet, Rfl: 3    topiramate (TOPAMAX) 25 mg tablet, 1 tab at bedtime for 3 days then 2 tabs at bedtime for 3 days then 3 tabs at bedtime for 3 days then 4 tabs after that, Disp: 120 tablet, Rfl: 2    TRANSDERM-SCOP, 1 5 MG, 1 MG/3DAYS TD 72 hr patch, APPLY ONE EVERY THREE DAYS AS NEEDED, Disp: , Rfl: 2    venlafaxine (EFFEXOR-XR) 37 5 mg 24 hr capsule, 1 tab PO daily for 2 weeks, if tolerated increase to 2 tabs PO daily (Patient taking differently: Take 75 mg by mouth daily 1 tab PO daily for 2 weeks, if tolerated increase to 2 tabs PO daily), Disp: 60 capsule, Rfl: 1    No Known Allergies    Physical Exam:    /83   Ht 5' 5" (1 651 m)   Wt 80 7 kg (178 lb)   BMI 29 62 kg/m²     Constitutional:normal, well developed, well nourished, alert, in no distress and non-toxic and no overt pain behavior  Eyes:anicteric  HEENT:grossly intact  Neck:supple, symmetric, trachea midline and no masses   Pulmonary:even and unlabored  Cardiovascular:No edema or pitting edema present  Skin:Normal without rashes or lesions and well hydrated  Psychiatric:Mood and affect appropriate  Neurologic:Cranial Nerves II-XII grossly intact  Musculoskeletal:normal gait  Bilateral cervical paraspinal musculature tender to palpation         Imaging  FL spine and pain procedure    (Results Pending)         Orders Placed This Encounter   Procedures    FL spine and pain procedure

## 2019-03-27 ENCOUNTER — TELEPHONE (OUTPATIENT)
Dept: PAIN MEDICINE | Facility: CLINIC | Age: 44
End: 2019-03-27

## 2019-03-27 NOTE — TELEPHONE ENCOUNTER
Faxed records [address] [place] [address] Life [employer] Case # [id number] Jennifer Silver  @ [phone]

## 2019-03-27 NOTE — TELEPHONE ENCOUNTER
Regarding: FW: Non-Urgent Medical Question  Contact: [phone]      ----- Message -----  From: Cristobal Bianchi  Sent: 3/26/2019   3:24 PM  To: Neurology [facility]  Subject: Non-Urgent Medical Question                      ----- Message from [address] [place] [address] [address], Generic sent at 3/26/2019  3:24 PM EDT -----    Dr Murtaza Lovett,  As per all of the symptoms that I have and speaking to Reshma at [facility] at today's appointment, can we try to rule out Lyme by setting up appointment at an Infections Disease Doctor  She recommended that it be run by you  I am scheduled for a second epidural injection on 4/10, but after speaking with her there seems to be more to all of this   In the long run, to rule this out would be ideal  And I am asking this way because I am not scheduled to return to your office until May 1st   Thank you,  Cristobal Bianchi

## 2019-03-29 NOTE — TELEPHONE ENCOUNTER
Called and spoke with rep Andria Hart who stated that the order could be here by Tuesday 04/02/19 pending on whether or not they get consent from the patient  I gave the address to the  and our office hours  I will call back again on Monday to check on the status

## 2019-04-04 NOTE — TELEPHONE ENCOUNTER
Called and spoke with rep Lugo who stated that they spoke to the patient and she stated she would call back to go over co-payment and Orma Seats has not yet received a call back  I will call the patient tomorrow and ask her to call in

## 2019-04-08 NOTE — TELEPHONE ENCOUNTER
Called and spoke with  [facility] who stated that the order is currently on hold  They are waiting to hear back from the patient to see whether or not she will get the co-payment assistance

## 2019-04-09 ENCOUNTER — APPOINTMENT (OUTPATIENT)
Dept: LAB | Facility: OTHER | Age: 44
End: 2019-04-09
Payer: COMMERCIAL

## 2019-04-09 ENCOUNTER — TRANSCRIBE ORDERS (OUTPATIENT)
Dept: LAB | Facility: OTHER | Age: 44
End: 2019-04-09

## 2019-04-09 ENCOUNTER — TELEPHONE (OUTPATIENT)
Dept: PAIN MEDICINE | Facility: CLINIC | Age: 44
End: 2019-04-09

## 2019-04-09 DIAGNOSIS — M79.10 MYALGIA: Primary | ICD-10-CM

## 2019-04-09 DIAGNOSIS — M79.10 MYALGIA: ICD-10-CM

## 2019-04-09 DIAGNOSIS — M25.50 PAIN IN JOINT, MULTIPLE SITES: Primary | ICD-10-CM

## 2019-04-09 DIAGNOSIS — R53.83 TIREDNESS: ICD-10-CM

## 2019-04-09 LAB
ALBUMIN SERPL BCP-MCNC: 3.6 G/DL (ref 3.5–5)
ALP SERPL-CCNC: 79 U/L (ref 46–116)
ALT SERPL W P-5'-P-CCNC: 23 U/L (ref 12–78)
ANION GAP SERPL CALCULATED.3IONS-SCNC: 9 MMOL/L (ref 4–13)
AST SERPL W P-5'-P-CCNC: 16 U/L (ref 5–45)
BASOPHILS # BLD AUTO: 0.04 THOUSANDS/ΜL (ref 0–0.1)
BASOPHILS NFR BLD AUTO: 1 % (ref 0–1)
BILIRUB SERPL-MCNC: 0.31 MG/DL (ref 0.2–1)
BUN SERPL-MCNC: 17 MG/DL (ref 5–25)
CALCIUM SERPL-MCNC: 9.1 MG/DL (ref 8.3–10.1)
CHLORIDE SERPL-SCNC: 108 MMOL/L (ref 100–108)
CO2 SERPL-SCNC: 22 MMOL/L (ref 21–32)
CREAT SERPL-MCNC: 0.93 MG/DL (ref 0.6–1.3)
CRP SERPL QL: <3 MG/L
EOSINOPHIL # BLD AUTO: 0.34 THOUSAND/ΜL (ref 0–0.61)
EOSINOPHIL NFR BLD AUTO: 4 % (ref 0–6)
ERYTHROCYTE [DISTWIDTH] IN BLOOD BY AUTOMATED COUNT: 12.9 % (ref 11.6–15.1)
ERYTHROCYTE [SEDIMENTATION RATE] IN BLOOD: 22 MM/HOUR (ref 0–20)
GFR SERPL CREATININE-BSD FRML MDRD: 76 ML/MIN/1.73SQ M
GLUCOSE P FAST SERPL-MCNC: 81 MG/DL (ref 65–99)
HCT VFR BLD AUTO: 41.1 % (ref 34.8–46.1)
HGB BLD-MCNC: 13.5 G/DL (ref 11.5–15.4)
IMM GRANULOCYTES # BLD AUTO: 0.04 THOUSAND/UL (ref 0–0.2)
IMM GRANULOCYTES NFR BLD AUTO: 1 % (ref 0–2)
LYMPHOCYTES # BLD AUTO: 2.77 THOUSANDS/ΜL (ref 0.6–4.47)
LYMPHOCYTES NFR BLD AUTO: 34 % (ref 14–44)
MCH RBC QN AUTO: 30.8 PG (ref 26.8–34.3)
MCHC RBC AUTO-ENTMCNC: 32.8 G/DL (ref 31.4–37.4)
MCV RBC AUTO: 94 FL (ref 82–98)
MONOCYTES # BLD AUTO: 0.67 THOUSAND/ΜL (ref 0.17–1.22)
MONOCYTES NFR BLD AUTO: 8 % (ref 4–12)
NEUTROPHILS # BLD AUTO: 4.29 THOUSANDS/ΜL (ref 1.85–7.62)
NEUTS SEG NFR BLD AUTO: 52 % (ref 43–75)
NRBC BLD AUTO-RTO: 0 /100 WBCS
PLATELET # BLD AUTO: 296 THOUSANDS/UL (ref 149–390)
PMV BLD AUTO: 10.9 FL (ref 8.9–12.7)
POTASSIUM SERPL-SCNC: 4.7 MMOL/L (ref 3.5–5.3)
PROT SERPL-MCNC: 7.7 G/DL (ref 6.4–8.2)
RBC # BLD AUTO: 4.39 MILLION/UL (ref 3.81–5.12)
SODIUM SERPL-SCNC: 139 MMOL/L (ref 136–145)
WBC # BLD AUTO: 8.15 THOUSAND/UL (ref 4.31–10.16)

## 2019-04-09 PROCEDURE — 86038 ANTINUCLEAR ANTIBODIES: CPT

## 2019-04-09 PROCEDURE — 36415 COLL VENOUS BLD VENIPUNCTURE: CPT

## 2019-04-09 PROCEDURE — 85025 COMPLETE CBC W/AUTO DIFF WBC: CPT

## 2019-04-09 PROCEDURE — 86140 C-REACTIVE PROTEIN: CPT

## 2019-04-09 PROCEDURE — 86618 LYME DISEASE ANTIBODY: CPT

## 2019-04-09 PROCEDURE — 80053 COMPREHEN METABOLIC PANEL: CPT

## 2019-04-09 PROCEDURE — 86617 LYME DISEASE ANTIBODY: CPT

## 2019-04-09 PROCEDURE — 85652 RBC SED RATE AUTOMATED: CPT

## 2019-04-10 ENCOUNTER — HOSPITAL ENCOUNTER (OUTPATIENT)
Dept: RADIOLOGY | Facility: CLINIC | Age: 44
Discharge: HOME | End: 2019-04-10
Attending: ANESTHESIOLOGY | Admitting: ANESTHESIOLOGY
Payer: COMMERCIAL

## 2019-04-10 VITALS
OXYGEN SATURATION: 98 % | SYSTOLIC BLOOD PRESSURE: 126 MMHG | TEMPERATURE: 97.8 F | RESPIRATION RATE: 20 BRPM | DIASTOLIC BLOOD PRESSURE: 90 MMHG | HEART RATE: 100 BPM

## 2019-04-10 DIAGNOSIS — M54.12 CERVICAL RADICULOPATHY: ICD-10-CM

## 2019-04-10 LAB — RYE IGE QN: NEGATIVE

## 2019-04-10 PROCEDURE — 62321 NJX INTERLAMINAR CRV/THRC: CPT | Performed by: ANESTHESIOLOGY

## 2019-04-10 RX ORDER — LIDOCAINE HYDROCHLORIDE 10 MG/ML
5 INJECTION, SOLUTION EPIDURAL; INFILTRATION; INTRACAUDAL; PERINEURAL ONCE
Status: COMPLETED | OUTPATIENT
Start: 2019-04-10 | End: 2019-04-10

## 2019-04-10 RX ORDER — PAPAVERINE HCL 150 MG
10 CAPSULE, EXTENDED RELEASE ORAL ONCE
Status: COMPLETED | OUTPATIENT
Start: 2019-04-10 | End: 2019-04-10

## 2019-04-10 RX ADMIN — IOHEXOL 1 ML: 300 INJECTION, SOLUTION INTRAVENOUS at 11:50

## 2019-04-10 RX ADMIN — DEXAMETHASONE SODIUM PHOSPHATE 10 MG: 10 INJECTION, SOLUTION INTRAMUSCULAR; INTRAVENOUS at 11:52

## 2019-04-10 RX ADMIN — LIDOCAINE HYDROCHLORIDE 3 ML: 10 INJECTION, SOLUTION EPIDURAL; INFILTRATION; INTRACAUDAL; PERINEURAL at 11:48

## 2019-04-11 LAB
B BURGDOR IGG SER IA-ACNC: 0.13
B BURGDOR IGM SER IA-ACNC: 2.05

## 2019-04-12 LAB

## 2019-04-12 NOTE — TELEPHONE ENCOUNTER
Botox Arrived in the [facility] location, it has been logged and stored in the fridge       200 units SDV  LOT# [id number]  NDC# 7952-6118-66  EXP- 08/2021

## 2019-04-17 ENCOUNTER — TELEPHONE (OUTPATIENT)
Dept: PAIN MEDICINE | Facility: CLINIC | Age: 44
End: 2019-04-17

## 2019-04-30 ENCOUNTER — TELEPHONE (OUTPATIENT)
Dept: PAIN MEDICINE | Facility: CLINIC | Age: 44
End: 2019-04-30

## 2019-04-30 ENCOUNTER — OFFICE VISIT (OUTPATIENT)
Dept: PAIN MEDICINE | Facility: CLINIC | Age: 44
End: 2019-04-30
Payer: COMMERCIAL

## 2019-04-30 VITALS
BODY MASS INDEX: 31.32 KG/M2 | WEIGHT: 188 LBS | DIASTOLIC BLOOD PRESSURE: 84 MMHG | HEIGHT: 65 IN | SYSTOLIC BLOOD PRESSURE: 131 MMHG | HEART RATE: 84 BPM

## 2019-04-30 DIAGNOSIS — G89.29 CHRONIC PAIN OF RIGHT UPPER EXTREMITY: ICD-10-CM

## 2019-04-30 DIAGNOSIS — M79.601 CHRONIC PAIN OF RIGHT UPPER EXTREMITY: ICD-10-CM

## 2019-04-30 DIAGNOSIS — M54.12 CERVICAL RADICULOPATHY: Primary | ICD-10-CM

## 2019-04-30 PROCEDURE — 99213 OFFICE O/P EST LOW 20 MIN: CPT | Performed by: NURSE PRACTITIONER

## 2019-04-30 RX ORDER — CYCLOSPORINE 0.5 MG/ML
EMULSION OPHTHALMIC
COMMUNITY
Start: 2016-05-29 | End: 2019-08-09 | Stop reason: ALTCHOICE

## 2019-04-30 RX ORDER — GABAPENTIN 300 MG/1
300 CAPSULE ORAL 3 TIMES DAILY
Qty: 90 CAPSULE | Refills: 2 | Status: SHIPPED | OUTPATIENT
Start: 2019-04-30 | End: 2019-12-11 | Stop reason: SDUPTHER

## 2019-05-01 ENCOUNTER — PROCEDURE VISIT (OUTPATIENT)
Dept: NEUROLOGY | Facility: CLINIC | Age: 44
End: 2019-05-01
Payer: COMMERCIAL

## 2019-05-01 VITALS — TEMPERATURE: 98 F | DIASTOLIC BLOOD PRESSURE: 84 MMHG | HEART RATE: 83 BPM | SYSTOLIC BLOOD PRESSURE: 129 MMHG

## 2019-05-01 DIAGNOSIS — G43.709 CHRONIC MIGRAINE WITHOUT AURA WITHOUT STATUS MIGRAINOSUS, NOT INTRACTABLE: Primary | ICD-10-CM

## 2019-05-01 PROCEDURE — 64615 CHEMODENERV MUSC MIGRAINE: CPT | Performed by: PSYCHIATRY & NEUROLOGY

## 2019-05-09 ENCOUNTER — OFFICE VISIT (OUTPATIENT)
Dept: OBGYN CLINIC | Facility: OTHER | Age: 44
End: 2019-05-09
Payer: COMMERCIAL

## 2019-05-09 ENCOUNTER — APPOINTMENT (OUTPATIENT)
Dept: RADIOLOGY | Facility: OTHER | Age: 44
End: 2019-05-09
Payer: COMMERCIAL

## 2019-05-09 ENCOUNTER — TRANSCRIBE ORDERS (OUTPATIENT)
Dept: OBGYN CLINIC | Facility: OTHER | Age: 44
End: 2019-05-09

## 2019-05-09 VITALS
WEIGHT: 191 LBS | DIASTOLIC BLOOD PRESSURE: 72 MMHG | HEART RATE: 100 BPM | SYSTOLIC BLOOD PRESSURE: 116 MMHG | BODY MASS INDEX: 31.82 KG/M2 | HEIGHT: 65 IN

## 2019-05-09 DIAGNOSIS — M25.521 PAIN IN RIGHT ELBOW: ICD-10-CM

## 2019-05-09 DIAGNOSIS — M77.11 LATERAL EPICONDYLITIS OF RIGHT ELBOW: ICD-10-CM

## 2019-05-09 DIAGNOSIS — M54.12 CERVICAL RADICULOPATHY: ICD-10-CM

## 2019-05-09 DIAGNOSIS — M25.511 RIGHT SHOULDER PAIN, UNSPECIFIED CHRONICITY: ICD-10-CM

## 2019-05-09 DIAGNOSIS — M25.511 RIGHT SHOULDER PAIN, UNSPECIFIED CHRONICITY: Primary | ICD-10-CM

## 2019-05-09 PROCEDURE — 99243 OFF/OP CNSLTJ NEW/EST LOW 30: CPT | Performed by: ORTHOPAEDIC SURGERY

## 2019-05-09 PROCEDURE — 73030 X-RAY EXAM OF SHOULDER: CPT

## 2019-05-09 PROCEDURE — 73080 X-RAY EXAM OF ELBOW: CPT

## 2019-06-06 ENCOUNTER — TELEPHONE (OUTPATIENT)
Dept: NEUROLOGY | Facility: CLINIC | Age: 44
End: 2019-06-06

## 2019-07-12 ENCOUNTER — TELEPHONE (OUTPATIENT)
Dept: NEUROLOGY | Facility: CLINIC | Age: 44
End: 2019-07-12

## 2019-07-12 NOTE — TELEPHONE ENCOUNTER
Type Date User Summary Attachment   General 07/12/2019  3:39  [address] Coordination -   Note    Botox- authorization #: [id number]- 2nd visit- valid from 3/9/2019 until 3/8/2020   Please use [address]         Thank you,     Tila Zaman

## 2019-07-16 NOTE — TELEPHONE ENCOUNTER
Per Carina Sweeney- patient now has [place] as of 8/1/19  Will await a copy of new insurance cards  Once they are received I will obtain authorization  Patient's Botox appointment has been rescheduled for 8/20/19

## 2019-08-02 ENCOUNTER — TELEPHONE (OUTPATIENT)
Dept: NEUROLOGY | Facility: CLINIC | Age: 44
End: 2019-08-02

## 2019-08-02 NOTE — TELEPHONE ENCOUNTER
Type Date User Summary Attachment   General 08/02/2019  2:03  [address] Coordination  -   Note    Botox- authorization #: [id number]- valid from 8/2/2019 until 8/2/2020   Please use [facility]      Thank you,     Anyi Love

## 2019-08-02 NOTE — TELEPHONE ENCOUNTER
Called [facility]- spoke with Veronica Dotson- I made her aware I was calling to enroll the patient for Botox with there specialty pharmacy  I was able to provide the patient's demographics, insurance information, and contact information  She was able to get me over to a pharmacist to provide a verbal Rx  Spoke with Garry Jose, pharmacist- provided a verbal Rx for Botox 200 units QTY: 1 under [facility] Lelia for Chronic migraine with 3 refills  Need by date set for 8/14/2019  Will do a status check in 2 days

## 2019-08-02 NOTE — TELEPHONE ENCOUNTER
Type Date User Summary Attachment   General 08/02/2019  2:03  [address] Coordination  -   Note    Botox- authorization #: [id number]- valid from 8/2/2019 until 8/2/2020   Please use [facility]      Thank you,     Lexi Santos

## 2019-08-05 NOTE — TELEPHONE ENCOUNTER
Patient's new insurance information:    [address]  ID#: [id number]  [address] [place] [address] Sw: [id number]  RXPCN: [id number]  [id number]: [id number]    New insurance card will be scanned into the patient's registration tab under documents as well as media

## 2019-08-05 NOTE — TELEPHONE ENCOUNTER
Called [facility]- spoke with Hayden Jansen- I made her aware I was calling to provide insurance information for the patient's Botox  She states the patient's order is in insurance verification  Patient will need to call in to give consent to ship and take care of HIPPA and new patient enrollment  Order marked as STAT

## 2019-08-07 NOTE — TELEPHONE ENCOUNTER
Called [facility]- spoke with Mattie Arroyo- she states the patient's Botox order is currently in insurance verification- she states this was just received on 7/5/2019- they tried to process this under pharmacy but it was not covered  Order marked as STAT with a need by date of 8/13/2019  Will do a status check in 2 days

## 2019-08-08 ENCOUNTER — TELEPHONE (OUTPATIENT)
Dept: INTERNAL MEDICINE CLINIC | Facility: CLINIC | Age: 44
End: 2019-08-08

## 2019-08-09 ENCOUNTER — OFFICE VISIT (OUTPATIENT)
Dept: INTERNAL MEDICINE CLINIC | Facility: CLINIC | Age: 44
End: 2019-08-09
Payer: COMMERCIAL

## 2019-08-09 VITALS
WEIGHT: 183.8 LBS | SYSTOLIC BLOOD PRESSURE: 100 MMHG | DIASTOLIC BLOOD PRESSURE: 72 MMHG | HEART RATE: 76 BPM | HEIGHT: 65 IN | OXYGEN SATURATION: 98 % | BODY MASS INDEX: 30.62 KG/M2 | TEMPERATURE: 98.4 F

## 2019-08-09 DIAGNOSIS — E55.9 VITAMIN D DEFICIENCY: ICD-10-CM

## 2019-08-09 DIAGNOSIS — Z13.220 SCREENING, LIPID: ICD-10-CM

## 2019-08-09 DIAGNOSIS — T75.3XXS MOTION SICKNESS, SEQUELA: ICD-10-CM

## 2019-08-09 DIAGNOSIS — Z12.39 SCREENING FOR BREAST CANCER: Primary | ICD-10-CM

## 2019-08-09 DIAGNOSIS — F41.9 ANXIETY AND DEPRESSION: ICD-10-CM

## 2019-08-09 DIAGNOSIS — F32.A ANXIETY AND DEPRESSION: ICD-10-CM

## 2019-08-09 PROBLEM — T75.3XXA MOTION SICKNESS: Status: ACTIVE | Noted: 2019-08-09

## 2019-08-09 PROBLEM — M77.11 LATERAL EPICONDYLITIS OF RIGHT ELBOW: Status: RESOLVED | Noted: 2019-05-09 | Resolved: 2019-08-09

## 2019-08-09 PROCEDURE — 3008F BODY MASS INDEX DOCD: CPT | Performed by: INTERNAL MEDICINE

## 2019-08-09 PROCEDURE — 99203 OFFICE O/P NEW LOW 30 MIN: CPT | Performed by: INTERNAL MEDICINE

## 2019-08-09 NOTE — ASSESSMENT & PLAN NOTE
Continue follow-up with Neurology in current regimen  She gets Botox injections every 3 months which have essentially resolved her migraine headaches  Previously having migraine headaches daily

## 2019-08-09 NOTE — PROGRESS NOTES
Assessment/Plan:    Chronic migraine without aura without status migrainosus, not intractable  Continue follow-up with Neurology in current regimen  She gets Botox injections every 3 months which have essentially resolved her migraine headaches  Previously having migraine headaches daily  Cervical radiculopathy  Continue follow-up with spine and pain  Anxiety and depression  She is requesting referral to Psychology/Behavioral Health  Defer on restarting Effexor  Motion sickness  Continue with PRN scopolamine patches  Diagnoses and all orders for this visit:    Screening for breast cancer  -     Mammo screening bilateral w cad; Future    Vitamin D deficiency    Anxiety and depression  -     Ambulatory referral to [facility]; Future    Motion sickness, sequela    Screening, lipid  -     Lipid panel; Future    BMI 30 0-30 9,adult    Other orders  -     Cancel: Mammo screening bilateral w 3d & cad; Future        BMI Counseling: Body mass index is 30 59 kg/m²  Discussed the patient's BMI with her  The BMI is above average  BMI counseling and education was provided to the patient  Nutrition recommendations include reducing portion sizes, decreasing overall calorie intake, 3-5 servings of fruits/vegetables daily, reducing fast food intake, consuming healthier snacks, decreasing soda and/or juice intake, moderation in carbohydrate intake, increasing intake of lean protein, reducing intake of saturated fat and trans fat and reducing intake of cholesterol  Exercise recommendations include moderate aerobic physical activity for 150 minutes/week and exercising 3-5 times per week  Subjective:      Patient ID: Jus Yu is a 38 y o  female  38year old female is seen today to establish care  Lab studies from 4/2019 and 12/2018 reviewed  She has a PMHx of migraines, cervical radiculopathy-LUE (nontraumatic), anxiety/depression, vitamin d deficiency     She sees spine and pain for management of cervical radiculopathy and neurology for management of migraine headaches  She socially drinks alcohol denies any tobacco or illicit drug use  Regarding her migraines, she was having migraine headaches regularly without improvement of oral medications however did have resolution of migraine headaches with current regimen which consist of Botox injections every 3 months, Topamax and magnesium oxide for prophylaxis, and Triptan for breakthrough headaches  Again, she follows up with Neurology regularly  She follows up with spine and pain management regarding cervical radiculopathy-radiation to left upper extremity  She is currently on gabapentin 3 times a day and as needed Flexeril for muscle spasms and Toradol for pain  Anxiety   Presents for initial visit  Onset was more than 5 years ago  The problem has been unchanged  Symptoms include excessive worry and nervous/anxious behavior  Patient reports no chest pain, compulsions, confusion, decreased concentration, depressed mood, dizziness, dry mouth, feeling of choking, hyperventilation, impotence, insomnia, irritability, malaise, muscle tension, nausea, obsessions, palpitations, panic, restlessness, shortness of breath or suicidal ideas  The severity of symptoms is mild  The patient sleeps 7 hours per night  The quality of sleep is fair  Nighttime awakenings: one to two, several      There is no history of anemia, anxiety/panic attacks, arrhythmia, asthma, bipolar disorder, CAD, CHF, chronic lung disease, depression, fibromyalgia, hyperthyroidism or suicide attempts  Past treatments include non-SSRI antidepressants (stopped taking in May 2019 due to insurance coverage  )  The treatment provided mild relief  Compliance with prior treatments has been variable         The following portions of the patient's history were reviewed and updated as appropriate: allergies, current medications, past family history, past medical history, past social history, past surgical history and problem list     Review of Systems   Constitutional: Negative for activity change, appetite change, chills, diaphoresis, fatigue, fever and irritability  HENT: Negative for congestion, postnasal drip, rhinorrhea, sinus pressure, sinus pain, sneezing and sore throat  Eyes: Negative for visual disturbance  Respiratory: Negative for apnea, cough, choking, chest tightness, shortness of breath and wheezing  Cardiovascular: Negative for chest pain, palpitations and leg swelling  Gastrointestinal: Negative for abdominal distention, abdominal pain, anal bleeding, blood in stool, constipation, diarrhea, nausea and vomiting  Endocrine: Negative for cold intolerance and heat intolerance  Genitourinary: Negative for difficulty urinating, dysuria, hematuria and impotence  Musculoskeletal: Negative  Skin: Negative  Neurological: Negative for dizziness, weakness, light-headedness, numbness and headaches  Hematological: Negative for adenopathy  Psychiatric/Behavioral: Negative for agitation, confusion, decreased concentration, sleep disturbance and suicidal ideas  The patient is nervous/anxious  The patient does not have insomnia  All other systems reviewed and are negative  Past Medical History:   Diagnosis Date    Anxiety     Depression     Migraine          Current Outpatient Medications:     Botulinum Toxin Type A 200 units SOLR, Inject up to 200 units IM by physician into the head and neck muscles every three months, Disp: 200 Units, Rfl: 4    magnesium oxide (MAG-OX) 400 mg, 1 tab in a m   Daily, Disp: 30 tablet, Rfl: 6    multivitamin (THERAGRAN) TABS, Take 1 tablet by mouth daily , Disp: , Rfl:     Riboflavin ([employer] VITAMIN B-2) 100 MG TABS, 2 tabs in a m  2 tabs in p m , Disp: 120 tablet, Rfl: 6    TRANSDERM-SCOP, 1 5 MG, 1 MG/3DAYS TD 72 hr patch, APPLY ONE EVERY THREE DAYS AS NEEDED, Disp: , Rfl: 2    cyanocobalamin (VITAMIN B-12) 100 mcg tablet, Take 100 mcg by mouth daily, Disp: , Rfl:     cyclobenzaprine (FLEXERIL) 10 mg tablet, 1 tab at bedtime for 10 days then 1 tab twice a day after that (Patient not taking: Reported on 8/9/2019), Disp: 60 tablet, Rfl: 3    gabapentin (NEURONTIN) 300 mg capsule, Take 1 capsule (300 mg total) by mouth 3 (three) times a day (Patient not taking: Reported on 8/9/2019), Disp: 90 capsule, Rfl: 2    ketorolac (TORADOL) 10 mg tablet, Three times a day for two days (Patient not taking: Reported on 8/9/2019), Disp: 6 tablet, Rfl: 3    OLANZapine (ZYPREXA) 5 mg tablet, Take 5 mg by mouth daily at bedtime, Disp: , Rfl:     prochlorperazine (COMPAZINE) 10 mg tablet, 1 tab at onset of migraine, can repeat in 8 hours, can take with triptan/NSAID (Patient not taking: Reported on 8/9/2019), Disp: 20 tablet, Rfl: 3    rizatriptan (MAXALT) 10 MG tablet, 1 at onset of a migraine headache May repeat every 2 hours if needed, max 20mg/24 hours (Patient not taking: Reported on 8/9/2019), Disp: 12 tablet, Rfl: 3    topiramate (TOPAMAX) 25 mg tablet, 1 tab at bedtime for 3 days then 2 tabs at bedtime for 3 days then 3 tabs at bedtime for 3 days then 4 tabs after that (Patient not taking: Reported on 8/9/2019), Disp: 120 tablet, Rfl: 2    venlafaxine (EFFEXOR-XR) 37 5 mg 24 hr capsule, 1 tab PO daily for 2 weeks, if tolerated increase to 2 tabs PO daily (Patient not taking: Reported on 8/9/2019), Disp: 60 capsule, Rfl: 1    No Known Allergies    Social History   Past Surgical History:   Procedure Laterality Date    TUBAL LIGATION       Family History   Problem Relation Age of Onset    Hypertension Mother     Hypertension Father     No Known Problems Sister     COPD Maternal Grandmother     No Known Problems Son     No Known Problems Daughter     Lung cancer Paternal Grandmother     Heart disease Paternal Grandfather        Objective:  /72 (BP Location: Left arm, Patient Position: Sitting, Cuff Size: Large)   Pulse 76   Temp 98 4 °F (36 9 °C) (Oral)   Ht 5' 5" (1 651 m)   Wt 83 4 kg (183 lb 12 8 oz) Comment: with sandals  SpO2 98% Comment: ra  BMI 30 59 kg/m²     No results found for this or any previous visit (from the past 1344 hour(s))  Physical Exam   Constitutional: She is oriented to person, place, and time  She appears well-developed and well-nourished  No distress  HENT:   Head: Normocephalic and atraumatic  Eyes: Pupils are equal, round, and reactive to light  Conjunctivae and EOM are normal  Right eye exhibits no discharge  Left eye exhibits no discharge  Neck: Normal range of motion  Neck supple  No JVD present  No thyromegaly present  Cardiovascular: Normal rate, regular rhythm, normal heart sounds and intact distal pulses  Exam reveals no gallop and no friction rub  No murmur heard  Pulmonary/Chest: Effort normal and breath sounds normal  No respiratory distress  She has no wheezes  She has no rales  She exhibits no tenderness  Abdominal: Soft  She exhibits no distension  There is no tenderness  Musculoskeletal: Normal range of motion  She exhibits no edema, tenderness or deformity  Lymphadenopathy:     She has no cervical adenopathy  Neurological: She is alert and oriented to person, place, and time  No cranial nerve deficit  Coordination normal    Skin: Skin is warm and dry  No rash noted  She is not diaphoretic  No erythema  No pallor  Psychiatric: She has a normal mood and affect  Her behavior is normal  Judgment and thought content normal    Nursing note and vitals reviewed

## 2019-08-09 NOTE — TELEPHONE ENCOUNTER
[facility] [place] she states the patient's Botox order is still in insurance verification  She is aware the patient's Botox is needed by 8/13/19  Will do a status check in 2 days

## 2019-08-12 NOTE — TELEPHONE ENCOUNTER
Called [facility]- spoke with Freda- she states the patient's Botox order is in insurance verification  Order has been marked as STAT with an e-mail sent to expedite the process  Will do a status check tomorrow

## 2019-08-14 NOTE — TELEPHONE ENCOUNTER
Patient returned my call- she is aware that her Botox order is ready to be set up for delivery and will call to give consent to ship today  Will contact [facility] around lunch time to coordinate delivery

## 2019-08-14 NOTE — TELEPHONE ENCOUNTER
Called [facility]- spoke with [place]- she states the patient's Botox order is ready to be scheduled for delivery  Patient's consent is not on file  Will contact the patient to call and give consent to ship

## 2019-08-14 NOTE — TELEPHONE ENCOUNTER
Called [facility]- spoke with [place]- he states the patient's Botox order is ready to be setup for delivery  Patient's consent is on file  Botox to be delivered on Thursday 8/15/2019 to the [facility] location via [address]- a signature will be required  Colon Diller,    Please await Botox delivery and document once it has arrived  Please let me know if you do not receive the patient's Botox order      Thank you,    Eun Galvan

## 2019-08-14 NOTE — TELEPHONE ENCOUNTER
Called patient- lmom awaiting a call back  When patient calls back please let her know that her Botox order is all ready to be set up for delivery  Patient must call [facility] to give consent to ship as soon as possible so I can get her Botox delivered by Friday  Please provide the patient with the phone number to [facility] noted below        [facility]- [phone]      Thank you

## 2019-08-15 ENCOUNTER — TRANSCRIBE ORDERS (OUTPATIENT)
Dept: NEUROLOGY | Facility: CLINIC | Age: 44
End: 2019-08-15

## 2019-08-15 DIAGNOSIS — G43.709 CHRONIC MIGRAINE WITHOUT AURA WITHOUT STATUS MIGRAINOSUS, NOT INTRACTABLE: Primary | ICD-10-CM

## 2019-08-15 DIAGNOSIS — G44.229 CHRONIC TENSION-TYPE HEADACHE, NOT INTRACTABLE: ICD-10-CM

## 2019-08-15 DIAGNOSIS — G44.099 OTHER TRIGEMINAL AUTONOMIC CEPHALGIAS (TAC), NOT INTRACTABLE: ICD-10-CM

## 2019-08-15 DIAGNOSIS — M54.12 RADICULOPATHY OF CERVICAL REGION: ICD-10-CM

## 2019-08-15 NOTE — TELEPHONE ENCOUNTER
Botox 200 unit SDV quantity of 1 (LOT # [id number]) arrived today  Placed in [address]  and logged

## 2019-08-20 ENCOUNTER — TRANSCRIBE ORDERS (OUTPATIENT)
Dept: LAB | Facility: OTHER | Age: 44
End: 2019-08-20

## 2019-08-20 ENCOUNTER — APPOINTMENT (OUTPATIENT)
Dept: LAB | Facility: OTHER | Age: 44
End: 2019-08-20
Payer: COMMERCIAL

## 2019-08-20 ENCOUNTER — PROCEDURE VISIT (OUTPATIENT)
Dept: NEUROLOGY | Facility: CLINIC | Age: 44
End: 2019-08-20
Payer: COMMERCIAL

## 2019-08-20 VITALS — SYSTOLIC BLOOD PRESSURE: 126 MMHG | HEART RATE: 86 BPM | TEMPERATURE: 97.8 F | DIASTOLIC BLOOD PRESSURE: 71 MMHG

## 2019-08-20 DIAGNOSIS — G43.709 CHRONIC MIGRAINE WITHOUT AURA WITHOUT STATUS MIGRAINOSUS, NOT INTRACTABLE: Primary | ICD-10-CM

## 2019-08-20 DIAGNOSIS — G44.229 CHRONIC TENSION-TYPE HEADACHE, NOT INTRACTABLE: ICD-10-CM

## 2019-08-20 DIAGNOSIS — M54.12 RADICULOPATHY OF CERVICAL REGION: ICD-10-CM

## 2019-08-20 DIAGNOSIS — Z13.220 SCREENING, LIPID: ICD-10-CM

## 2019-08-20 DIAGNOSIS — G44.099 OTHER TRIGEMINAL AUTONOMIC CEPHALGIAS (TAC), NOT INTRACTABLE: ICD-10-CM

## 2019-08-20 LAB
CHOLEST SERPL-MCNC: 248 MG/DL (ref 50–200)
HDLC SERPL-MCNC: 47 MG/DL (ref 40–60)
LDLC SERPL CALC-MCNC: 152 MG/DL (ref 0–100)
NONHDLC SERPL-MCNC: 201 MG/DL
TRIGL SERPL-MCNC: 246 MG/DL

## 2019-08-20 PROCEDURE — 36415 COLL VENOUS BLD VENIPUNCTURE: CPT

## 2019-08-20 PROCEDURE — 80061 LIPID PANEL: CPT

## 2019-08-20 PROCEDURE — 64615 CHEMODENERV MUSC MIGRAINE: CPT | Performed by: PSYCHIATRY & NEUROLOGY

## 2019-08-20 PROCEDURE — 64615 CHEMODENERV MUSC MIGRAINE: CPT | Performed by: PHYSICIAN ASSISTANT

## 2019-08-23 ENCOUNTER — HOSPITAL ENCOUNTER (OUTPATIENT)
Dept: RADIOLOGY | Facility: IMAGING CENTER | Age: 44
Discharge: HOME | End: 2019-08-23
Payer: COMMERCIAL

## 2019-08-23 VITALS — WEIGHT: 180 LBS | BODY MASS INDEX: 29.99 KG/M2 | HEIGHT: 65 IN

## 2019-08-23 DIAGNOSIS — Z12.39 SCREENING FOR BREAST CANCER: ICD-10-CM

## 2019-08-23 PROCEDURE — 77067 SCR MAMMO BI INCL CAD: CPT

## 2019-08-27 NOTE — PROGRESS NOTES
Chemodenervation  Date/Time: 8/27/2019 7:37 AM  Performed by: Ijeoma Ordaz PA-C  Authorized by: Ijeoma Ordaz PA-C     Pre-procedure details:     Prepped With: Alcohol    Anesthesia (see MAR for exact dosages): Anesthesia method:  None  Procedure details:     Position:  Upright  Botox:     Botox Type:  Type A    Brand:  Botox    mL's of Botulinum Toxin:  200    Final Concentration per CC:  200 units    Needle Gauge:  30 G 2 5 inch  Procedures:     Botox Procedures: chronic headache      Indications: migraines    Injection Location:     Head / Face:  L superior cervical paraspinal, R superior cervical paraspinal, L , R , L frontalis, R frontalis, L medial occipitalis, R medial occipitalis, procerus, R temporalis, L temporalis, R superior trapezius and L superior trapezius    L  injection amount:  5 unit(s)    R  injection amount:  5 unit(s)    L lateral frontalis:  5 unit(s)    R lateral frontalis:  5 unit(s)    L medial frontalis:  5 unit(s)    R medial frontalis:  5 unit(s)    L temporalis injection amount:  20 unit(s)    R temporalis injection amount:  20 unit(s)    Procerus injection amount:  5 unit(s)    L medial occipitalis injection amount:  15 unit(s)    R medial occipitalis injection amount:  15 unit(s)    L superior cervical paraspinal injection amount:  10 unit(s)    R superior cervical paraspinal injection amount:  10 unit(s)    L superior trapezius injection amount:  15 unit(s)    R superior trapezius injection amount:  15 unit(s)  Total Units:     Total units used:  200    Total units discarded:  0  Post-procedure details:     Chemodenervation:  Chronic migraine    Facial Nerve Location[de-identified]  Bilateral facial nerve    Patient tolerance of procedure:   Tolerated well, no immediate complications  Comments:         5 units orbicularis oculi bilaterally  35 units frontalis  All medically necessary

## 2019-09-04 ENCOUNTER — TELEPHONE (OUTPATIENT)
Dept: INTERNAL MEDICINE CLINIC | Facility: CLINIC | Age: 44
End: 2019-09-04

## 2019-09-04 NOTE — TELEPHONE ENCOUNTER
The 10-year ASCVD risk score (Salud Epps et al , 2013) is: 1 3%    Values used to calculate the score:      Age: 38 years      Sex: Female      Is Non- : No      Diabetic: No      Tobacco smoker: No      Systolic Blood Pressure: 130 mmHg      Is BP treated: No      HDL Cholesterol: 47 mg/dL      Total Cholesterol: 248 mg/dL    Lipid panel reviewed  Will discuss with patient lifestyle modifications with diet, exercise, and weight loss  Overall 10 year ASCVD risk is 1 3% however her LDL is elevated at 152  Continue to monitor off lipid-lowering medications at this time, recheck lipid panel in 6 months  If cholesterol levels remain elevated, will consider starting lipid-lowering medications such as a statin

## 2019-10-30 ENCOUNTER — TELEPHONE (OUTPATIENT)
Dept: NEUROLOGY | Facility: CLINIC | Age: 44
End: 2019-10-30

## 2019-10-30 NOTE — TELEPHONE ENCOUNTER
Type Date User Summary Attachment   General 10/29/2019  4:11 PM Neelima Taylor care coordination  -   Note    Botox- authorization #: [id number]- valid from 8/2/2019 until 8/2/2020   Please use [facility]      Thank you,     [facility]

## 2019-11-01 NOTE — TELEPHONE ENCOUNTER
Called [facility] and spoke with rep Dion Galvan to do STAT fill request for Botox 200 unit SDV quantity of 1  Note need medication by 11/13/19 to [facility] office  Call on Wednesday for status update

## 2019-11-06 NOTE — TELEPHONE ENCOUNTER
Received a call from David Gamboa from [facility]- she states she is calling to set up delivery for the patient's Botox order  Patient's consent is on file  Botox to be delivered Tuesday 11/12/2019 to the [facility] location via [address]- a signature will be required  Rios Gamboa,    Please await Botox delivery and document once it has arrived  Please let me know if you do not receive the patient's Botox order      Thanks,    Aneta Myers

## 2019-11-19 ENCOUNTER — TRANSCRIBE ORDERS (OUTPATIENT)
Dept: NEUROLOGY | Facility: CLINIC | Age: 44
End: 2019-11-19

## 2019-11-19 ENCOUNTER — PROCEDURE VISIT (OUTPATIENT)
Dept: NEUROLOGY | Facility: CLINIC | Age: 44
End: 2019-11-19
Payer: COMMERCIAL

## 2019-11-19 VITALS — SYSTOLIC BLOOD PRESSURE: 123 MMHG | TEMPERATURE: 97.9 F | HEART RATE: 96 BPM | DIASTOLIC BLOOD PRESSURE: 89 MMHG

## 2019-11-19 DIAGNOSIS — M54.12 RADICULOPATHY, CERVICAL REGION: ICD-10-CM

## 2019-11-19 DIAGNOSIS — G43.709 CHRONIC MIGRAINE WITHOUT AURA WITHOUT STATUS MIGRAINOSUS, NOT INTRACTABLE: Primary | ICD-10-CM

## 2019-11-19 DIAGNOSIS — G44.099 OTHER TRIGEMINAL AUTONOMIC CEPHALGIAS (TAC), NOT INTRACTABLE: ICD-10-CM

## 2019-11-19 DIAGNOSIS — G43.709 CHRONIC MIGRAINE WITHOUT AURA, NOT INTRACTABLE, WITHOUT STATUS MIGRAINOSUS: Primary | ICD-10-CM

## 2019-11-19 DIAGNOSIS — G43.709 CHRONIC MIGRAINE WITHOUT AURA, NOT INTRACTABLE, WITHOUT STATUS MIGRAINOSUS: ICD-10-CM

## 2019-11-19 PROCEDURE — 64615 CHEMODENERV MUSC MIGRAINE: CPT | Performed by: PHYSICIAN ASSISTANT

## 2019-11-19 NOTE — PROGRESS NOTES
Chemodenervation  Date/Time: 11/19/2019 3:51 PM  Performed by: Georgi Lechuga PA-C  Authorized by: Georgi Lechuga PA-C     Pre-procedure details:     Prepped With: Alcohol    Anesthesia (see MAR for exact dosages): Anesthesia method:  None  Procedure details:     Position:  Upright  Botox:     Botox Type:  Type A    Brand:  Botox    mL's of Botulinum Toxin:  200    Final Concentration per CC:  200 units    Needle Gauge:  30 G 2 5 inch  Procedures:     Botox Procedures: chronic headache      Indications: migraines    Injection Location:     Head / Face:  L superior cervical paraspinal, R superior cervical paraspinal, L , R , L frontalis, R frontalis, L medial occipitalis, R medial occipitalis, procerus, R temporalis, L temporalis, R superior trapezius and L superior trapezius    L  injection amount:  5 unit(s)    R  injection amount:  5 unit(s)    L lateral frontalis:  5 unit(s)    R lateral frontalis:  5 unit(s)    L medial frontalis:  5 unit(s)    R medial frontalis:  5 unit(s)    L temporalis injection amount:  20 unit(s)    R temporalis injection amount:  20 unit(s)    Procerus injection amount:  5 unit(s)    L medial occipitalis injection amount:  15 unit(s)    R medial occipitalis injection amount:  15 unit(s)    L superior cervical paraspinal injection amount:  10 unit(s)    R superior cervical paraspinal injection amount:  10 unit(s)    L superior trapezius injection amount:  15 unit(s)    R superior trapezius injection amount:  15 unit(s)  Total Units:     Total units used:  200    Total units discarded:  0  Post-procedure details:     Chemodenervation:  Chronic migraine    Facial Nerve Location[de-identified]  Bilateral facial nerve    Patient tolerance of procedure:   Tolerated well, no immediate complications  Comments:      5 units orbicularis oculi bilaterally  35 units frontalis  All medically necessary

## 2019-12-11 ENCOUNTER — OFFICE VISIT (OUTPATIENT)
Dept: PAIN MEDICINE | Facility: CLINIC | Age: 44
End: 2019-12-11
Payer: COMMERCIAL

## 2019-12-11 VITALS
HEIGHT: 65 IN | HEART RATE: 86 BPM | DIASTOLIC BLOOD PRESSURE: 79 MMHG | BODY MASS INDEX: 30.32 KG/M2 | WEIGHT: 182 LBS | SYSTOLIC BLOOD PRESSURE: 112 MMHG

## 2019-12-11 DIAGNOSIS — M54.12 CERVICAL RADICULOPATHY: ICD-10-CM

## 2019-12-11 PROCEDURE — 99214 OFFICE O/P EST MOD 30 MIN: CPT | Performed by: NURSE PRACTITIONER

## 2019-12-11 RX ORDER — GABAPENTIN 300 MG/1
300 CAPSULE ORAL 3 TIMES DAILY
Qty: 90 CAPSULE | Refills: 0 | Status: SHIPPED | OUTPATIENT
Start: 2019-12-11 | End: 2020-01-17 | Stop reason: SDUPTHER

## 2019-12-11 NOTE — PATIENT INSTRUCTIONS
Take 1 cap at bedtime x 5 days, then increase to 1 capsule twice a day x 5 days, then increase to 1 capsule three times a day and continue on this dose until seen back in the office

## 2019-12-11 NOTE — PROGRESS NOTES
Assessment:  1  Cervical radiculopathy        Plan:  Patient returns to the office with neck and left upper extremity pain  She has had cervical epidural steroid injections in the past   Although in the past she did not feel they were working, she states now that she has not had an injection in quite some time, and the pain is returning, she realizes that they were quite helpful for her left upper extremity symptoms and wishes to repeat cervical epidural steroid injection at this time  Her right upper extremity symptoms have resolved  She is no longer taking medication for her pain or headaches and only is receiving Botox injections from Neurology  She is willing to retry gabapentin for her symptoms  1   I will schedule the patient for a left cervical epidural steroid injection to address the inflammatory component of the patient's pain  Complete risks and benefits including bleeding, infection, tissue reaction, nerve injury and allergic reaction were discussed  The patient was agreeable and verbalized an understanding  2  I will reinitiate the patient on gabapentin 300 mg q h s  And titrate up to 900 mg daily  Patient was educated on medications most common side effects which include dizziness, drowsiness, and swelling of the hands and feet  Patient was instructed to call the office with any adverse medication side effects  The patient is also aware that if they would like to come off of the medication, they need to let us know as suddenly stopping the medication puts them at risk to have a seizure  The patient was agreeable and verbalized an understanding  3  Patient will continue with her home exercise program  4  The patient will continue to follow with Neurology as scheduled  5  I will follow up with the patient after the procedure or sooner if needed    M*Modal software was used to dictate this note  It may contain errors with dictating incorrect words or incorrect spelling   Please contact the provider directly with any questions  History of Present Illness: The patient is a 38 y o  female with a history of migraines last seen on 4/30/19 who presents for a follow up office visit in regards to chronic neck pain that radiates into the left upper extremity secondary to cervical radiculopathy  The patient denies bowel or bladder incontinence or balance issues  She has had cervical epidural steroid injections in the past   Although in the past she did not feel they were working, she states now that she has not had an injection in quite some time, and the pain is returning, she realizes that they were quite helpful for her left upper extremity symptoms and wishes to repeat cervical epidural steroid injection at this time  Her right upper extremity symptoms have resolved  She is no longer taking medication for her pain or headaches and only is receiving Botox injections from Neurology  She is willing to retry gabapentin for her symptoms  MRI of the cervical spine and thoracic spine were unremarkable and did not show any compressive pathology or degenerative disease, however EMG of the bilateral upper extremities revealed a chronic left C5-6 radiculopathy  The patient currently rates her pain a 7/10 on the numeric pain rating scale  She states her pain is constant nature and bothersome throughout the entirety of the day  She characterizes the pain as dull aching, throbbing, pressure-like, numbness and pins and needles  I have personally reviewed and/or updated the patient's past medical history, past surgical history, family history, social history, current medications, allergies, and vital signs today  Review of Systems:    Review of Systems   Respiratory: Negative for shortness of breath  Cardiovascular: Negative for chest pain  Gastrointestinal: Positive for nausea  Negative for constipation, diarrhea and vomiting  Musculoskeletal: Positive for joint swelling   Negative for arthralgias, gait problem and myalgias  Skin: Negative for rash  Neurological: Positive for weakness  Negative for dizziness and seizures  All other systems reviewed and are negative  Past Medical History:   Diagnosis Date    Anxiety     Depression     Migraine        Past Surgical History:   Procedure Laterality Date    TUBAL LIGATION         Family History   Problem Relation Age of Onset    Hypertension Mother     Hypertension Father     No Known Problems Sister     COPD Maternal Grandmother     No Known Problems Son     No Known Problems Daughter     No Known Problems Sister     Lung cancer Paternal Grandmother     Heart disease Paternal Grandfather     Stroke Paternal Grandfather     No Known Problems Maternal Aunt     Mental illness Maternal Aunt     Mental illness Maternal Aunt     No Known Problems Maternal Uncle     Lung cancer Paternal Aunt     No Known Problems Paternal Aunt     No Known Problems Paternal Aunt     Mental illness Paternal Aunt     Stroke Paternal Uncle        Social History     Occupational History    Occupation: [employer]   Tobacco Use    Smoking status: Never Smoker    Smokeless tobacco: Never Used   Substance and Sexual Activity    Alcohol use: Yes     Frequency: 2-4 times a month     Comment: Socially     Drug use: No    Sexual activity: Not on file         Current Outpatient Medications:     Botulinum Toxin Type A 200 units SOLR, Inject up to 200 units IM by physician into the head and neck muscles every three months, Disp: 200 Units, Rfl: 4    cyanocobalamin (VITAMIN B-12) 100 mcg tablet, Take 100 mcg by mouth daily, Disp: , Rfl:     magnesium oxide (MAG-OX) 400 mg, 1 tab in a m   Daily, Disp: 30 tablet, Rfl: 6    multivitamin (THERAGRAN) TABS, Take 1 tablet by mouth daily , Disp: , Rfl:     OLANZapine (ZYPREXA) 5 mg tablet, Take 5 mg by mouth daily at bedtime, Disp: , Rfl:     Riboflavin (CVS VITAMIN B-2) 100 MG TABS, 2 tabs in a m  2 tabs in p m , Disp: 120 tablet, Rfl: 6    topiramate (TOPAMAX) 25 mg tablet, 1 tab at bedtime for 3 days then 2 tabs at bedtime for 3 days then 3 tabs at bedtime for 3 days then 4 tabs after that, Disp: 120 tablet, Rfl: 2    TRANSDERM-SCOP, 1 5 MG, 1 MG/3DAYS TD 72 hr patch, APPLY ONE EVERY THREE DAYS AS NEEDED, Disp: , Rfl: 2    venlafaxine (EFFEXOR-XR) 37 5 mg 24 hr capsule, 1 tab PO daily for 2 weeks, if tolerated increase to 2 tabs PO daily, Disp: 60 capsule, Rfl: 1    cyclobenzaprine (FLEXERIL) 10 mg tablet, 1 tab at bedtime for 10 days then 1 tab twice a day after that (Patient not taking: Reported on 8/9/2019), Disp: 60 tablet, Rfl: 3    gabapentin (NEURONTIN) 300 mg capsule, Take 1 capsule (300 mg total) by mouth 3 (three) times a day, Disp: 90 capsule, Rfl: 0    ketorolac (TORADOL) 10 mg tablet, Three times a day for two days (Patient not taking: Reported on 8/9/2019), Disp: 6 tablet, Rfl: 3    prochlorperazine (COMPAZINE) 10 mg tablet, 1 tab at onset of migraine, can repeat in 8 hours, can take with triptan/NSAID (Patient not taking: Reported on 8/9/2019), Disp: 20 tablet, Rfl: 3    rizatriptan (MAXALT) 10 MG tablet, 1 at onset of a migraine headache May repeat every 2 hours if needed, max 20mg/24 hours (Patient not taking: Reported on 8/9/2019), Disp: 12 tablet, Rfl: 3    No Known Allergies    Physical Exam:    /79   Pulse 86   Ht 5' 5" (1 651 m)   Wt 82 6 kg (182 lb)   BMI 30 29 kg/m²     Constitutional:normal, well developed, well nourished, alert, in no distress and non-toxic and no overt pain behavior  Eyes:anicteric  HEENT:grossly intact  Neck:supple, symmetric, trachea midline and no masses   Pulmonary:even and unlabored  Cardiovascular:No edema or pitting edema present  Skin:Normal without rashes or lesions and well hydrated  Psychiatric:Mood and affect appropriate  Neurologic:Cranial Nerves II-XII grossly intact  Musculoskeletal:normal gait        Imaging  FL spine and pain procedure    (Results Pending)         Orders Placed This Encounter   Procedures    FL spine and pain procedure

## 2019-12-24 ENCOUNTER — HOSPITAL ENCOUNTER (OUTPATIENT)
Dept: RADIOLOGY | Facility: CLINIC | Age: 44
Discharge: HOME | End: 2019-12-24
Payer: COMMERCIAL

## 2019-12-24 VITALS
DIASTOLIC BLOOD PRESSURE: 74 MMHG | SYSTOLIC BLOOD PRESSURE: 105 MMHG | RESPIRATION RATE: 20 BRPM | HEART RATE: 87 BPM | TEMPERATURE: 97.8 F | OXYGEN SATURATION: 99 %

## 2019-12-24 DIAGNOSIS — M54.12 CERVICAL RADICULOPATHY: ICD-10-CM

## 2019-12-24 PROCEDURE — 62321 NJX INTERLAMINAR CRV/THRC: CPT | Performed by: ANESTHESIOLOGY

## 2019-12-24 RX ORDER — PAPAVERINE HCL 150 MG
10 CAPSULE, EXTENDED RELEASE ORAL ONCE
Status: COMPLETED | OUTPATIENT
Start: 2019-12-24 | End: 2019-12-24

## 2019-12-24 RX ORDER — LIDOCAINE HYDROCHLORIDE 10 MG/ML
5 INJECTION, SOLUTION EPIDURAL; INFILTRATION; INTRACAUDAL; PERINEURAL ONCE
Status: COMPLETED | OUTPATIENT
Start: 2019-12-24 | End: 2019-12-24

## 2019-12-24 RX ADMIN — DEXAMETHASONE SODIUM PHOSPHATE 10 MG: 10 INJECTION, SOLUTION INTRAMUSCULAR; INTRAVENOUS at 09:15

## 2019-12-24 RX ADMIN — IOHEXOL 1 ML: 300 INJECTION, SOLUTION INTRAVENOUS at 09:14

## 2019-12-24 RX ADMIN — LIDOCAINE HYDROCHLORIDE 3 ML: 10 INJECTION, SOLUTION EPIDURAL; INFILTRATION; INTRACAUDAL; PERINEURAL at 09:12

## 2019-12-24 NOTE — DISCHARGE INSTR - LAB
Epidural Steroid Injection   WHAT YOU NEED TO KNOW:   An epidural steroid injection (KESHAV) is a procedure to inject steroid medicine into the epidural space  The epidural space is between your spinal cord and vertebrae  Steroids reduce inflammation and fluid buildup in your spine that may be causing pain  You may be given pain medicine along with the steroids  ACTIVITY  · Do not drive or operate machinery today  · No strenuous activity today - bending, lifting, etc   · You may resume normal activites starting tomorrow - start slowly and as tolerated  · You may shower today, but no tub baths or hot tubs  · You may have numbness for several hours from the local anesthetic  Please use caution and common sense, especially with weight-bearing activities  CARE OF THE INJECTION SITE  · If you have soreness or pain, apply ice to the area today (20 minutes on/20 minutes off)  · Starting tomorrow, you may use warm, moist heat or ice if needed  · You may have an increase or change in your discomfort for 36-48 hours after your treatment  · Apply ice and continue with any pain medication you have been prescribed  · Notify [facility] if you have any of the following: redness, drainage, swelling, headache, stiff neck or fever above 100°F     SPECIAL INSTRUCTIONS  · Our office will contact you in approximately 7 days for a progress report  MEDICATIONS  · Continue to take all routine medications  · Our office may have instructed you to hold some medications  If you have a problem specifically related to your procedure, please call our office at [phone]  Problems not related to your procedure should be directed to your primary care physician

## 2019-12-31 ENCOUNTER — TELEPHONE (OUTPATIENT)
Dept: PAIN MEDICINE | Facility: CLINIC | Age: 44
End: 2019-12-31

## 2019-12-31 NOTE — TELEPHONE ENCOUNTER
Pt reports 0% improvement and 5/10 pain level  Pt also wanted to know if she should have scheduled a f/u appt

## 2020-01-06 NOTE — TELEPHONE ENCOUNTER
Patient states that she has seen a lot of improvement  Patient states that she has some throbbing that is intermittent  Patient states that the throbbing is tolerant  Pain scale is 1/10 with 80%  Patient states that she started taking the Gabapentin after the injection  Patient states that she was suppose to start earlier, but had a family emergency  Patient wondering if she should make a follow up appointment

## 2020-01-16 ENCOUNTER — APPOINTMENT (OUTPATIENT)
Dept: LAB | Age: 45
End: 2020-01-16

## 2020-01-16 ENCOUNTER — APPOINTMENT (OUTPATIENT)
Dept: URGENT CARE | Age: 45
End: 2020-01-16

## 2020-01-16 DIAGNOSIS — Z02.1 PRE-EMPLOYMENT EXAMINATION: ICD-10-CM

## 2020-01-16 DIAGNOSIS — Z02.1 PRE-EMPLOYMENT EXAMINATION: Primary | ICD-10-CM

## 2020-01-16 LAB
HBV SURFACE AB SER-ACNC: 4.97 MIU/ML
RUBV IGG SERPL IA-ACNC: 45.7 IU/ML

## 2020-01-16 PROCEDURE — 86765 RUBEOLA ANTIBODY: CPT

## 2020-01-16 PROCEDURE — 86706 HEP B SURFACE ANTIBODY: CPT

## 2020-01-16 PROCEDURE — 86735 MUMPS ANTIBODY: CPT

## 2020-01-16 PROCEDURE — 86762 RUBELLA ANTIBODY: CPT

## 2020-01-16 PROCEDURE — 36415 COLL VENOUS BLD VENIPUNCTURE: CPT

## 2020-01-16 PROCEDURE — 86787 VARICELLA-ZOSTER ANTIBODY: CPT

## 2020-01-16 PROCEDURE — 86480 TB TEST CELL IMMUN MEASURE: CPT

## 2020-01-17 ENCOUNTER — OFFICE VISIT (OUTPATIENT)
Dept: PAIN MEDICINE | Facility: CLINIC | Age: 45
End: 2020-01-17
Payer: COMMERCIAL

## 2020-01-17 VITALS
SYSTOLIC BLOOD PRESSURE: 119 MMHG | WEIGHT: 185 LBS | HEIGHT: 65 IN | BODY MASS INDEX: 30.82 KG/M2 | DIASTOLIC BLOOD PRESSURE: 80 MMHG

## 2020-01-17 DIAGNOSIS — M54.12 CERVICAL RADICULOPATHY: Primary | ICD-10-CM

## 2020-01-17 LAB
GAMMA INTERFERON BACKGROUND BLD IA-ACNC: 0.06 IU/ML
M TB IFN-G BLD-IMP: NEGATIVE
M TB IFN-G CD4+ BCKGRND COR BLD-ACNC: 0.01 IU/ML
M TB IFN-G CD4+ BCKGRND COR BLD-ACNC: 0.01 IU/ML
MITOGEN IGNF BCKGRD COR BLD-ACNC: >10 IU/ML

## 2020-01-17 PROCEDURE — 99213 OFFICE O/P EST LOW 20 MIN: CPT | Performed by: NURSE PRACTITIONER

## 2020-01-17 RX ORDER — GABAPENTIN 300 MG/1
CAPSULE ORAL
Qty: 90 CAPSULE | Refills: 2 | Status: SHIPPED | OUTPATIENT
Start: 2020-01-17 | End: 2020-03-13

## 2020-01-17 NOTE — PROGRESS NOTES
Assessment:  1  Cervical radiculopathy        Plan:  Patient is status post left cervical epidural steroid injection with Dr Gianna Cast on December 24, 2019 and continues with 80% improvement of her symptoms  Overall, she is happy with her current pain relief  1  The patient may continue gabapentin 900 mg daily, however she is experiencing headaches in the morning since she takes all of her medication at night  I advised that she try taking 300 mg in the morning and 600 mg at dinner time to see if this improves her symptoms  The patient was agreeable and verbalized an understanding  2  I discussed with the patient that since there has been moderate to significant improvement in the pain symptoms, we will hold off on any repeat injections at this point in time  However, I reviewed with the patient that if their symptoms should return or worsen,  they should call our office to schedule to discuss repeating the injection  The patient was agreeable and verbalized an understanding  3  The patient will follow-up in 2 months for medication prescription refill and reevaluation  The patient was advised to contact the office should their symptoms worsen in the interim  The patient was agreeable and verbalized an understanding  M*Modal software was used to dictate this note  It may contain errors with dictating incorrect words or incorrect spelling  Please contact the provider directly with any questions  History of Present Illness: The patient is a 38 y o  female with a history of migraines last seen on 12/11/19 who presents for a follow up office visit in regards to chronic neck pain that radiates into the left upper extremity secondary to cervical radiculopathy  The patient denies right upper extremity symptoms, bowel or bladder incontinence or balance issues    She is status post cervical epidural steroid injection with Dr Gianna Cast on December 24, 2019 and reports an 80% improvement of her symptoms at this point   She did also recently restart gabapentin and has noticed a combination has helped her pain, however it is worsening her headaches  She would like to try and change the time she takes the medication during the day to see if it improves her symptoms  The patient currently rates her pain a 1 to 2/10 on the numeric pain scale  She states her pain is intermittent in nature and bothersome throughout the entirety of the day  She characterizes pain as dull aching and throbbing  MRI of the cervical spine and thoracic spine were unremarkable and did not show any compressive pathology or degenerative disease, however EMG of the bilateral upper extremities revealed a chronic left C5-6 radiculopathy  Current pain medications includes:  Gabapentin 900 mg daily   The patient reports that this regimen is providing 80% pain relief  The patient is reporting headache from this pain medication regimen  I have personally reviewed and/or updated the patient's past medical history, past surgical history, family history, social history, current medications, allergies, and vital signs today  Review of Systems:    Review of Systems   Respiratory: Negative for shortness of breath  Cardiovascular: Negative for chest pain  Gastrointestinal: Negative for constipation, diarrhea, nausea and vomiting  Musculoskeletal: Negative for arthralgias, gait problem, joint swelling and myalgias  Skin: Negative for rash  Neurological: Negative for dizziness, seizures and weakness  All other systems reviewed and are negative          Past Medical History:   Diagnosis Date    Anxiety     Depression     Migraine        Past Surgical History:   Procedure Laterality Date    TUBAL LIGATION         Family History   Problem Relation Age of Onset    Hypertension Mother     Hypertension Father     No Known Problems Sister     COPD Maternal Grandmother     No Known Problems Son     No Known Problems Daughter     No Known Problems Sister     Lung cancer Paternal Grandmother     Heart disease Paternal Grandfather     Stroke Paternal Grandfather     No Known Problems Maternal Aunt     Mental illness Maternal Aunt     Mental illness Maternal Aunt     No Known Problems Maternal Uncle     Lung cancer Paternal Aunt     No Known Problems Paternal Aunt     No Known Problems Paternal Aunt     Mental illness Paternal Aunt     Stroke Paternal Uncle        Social History     Occupational History    Occupation: [employer]   Tobacco Use    Smoking status: Never Smoker    Smokeless tobacco: Never Used   Substance and Sexual Activity    Alcohol use: Yes     Frequency: 2-4 times a month     Comment: Socially     Drug use: No    Sexual activity: Not on file         Current Outpatient Medications:     gabapentin (NEURONTIN) 300 mg capsule, Take 1 PO AM and 2 PO dinner time, Disp: 90 capsule, Rfl: 2    TRANSDERM-SCOP, 1 5 MG, 1 MG/3DAYS TD 72 hr patch, APPLY ONE EVERY THREE DAYS AS NEEDED, Disp: , Rfl: 2    Botulinum Toxin Type A 200 units SOLR, Inject up to 200 units IM by physician into the head and neck muscles every three months (Patient not taking: Reported on 1/17/2020), Disp: 200 Units, Rfl: 4    cyanocobalamin (VITAMIN B-12) 100 mcg tablet, Take 100 mcg by mouth daily, Disp: , Rfl:     cyclobenzaprine (FLEXERIL) 10 mg tablet, 1 tab at bedtime for 10 days then 1 tab twice a day after that (Patient not taking: Reported on 8/9/2019), Disp: 60 tablet, Rfl: 3    ketorolac (TORADOL) 10 mg tablet, Three times a day for two days (Patient not taking: Reported on 8/9/2019), Disp: 6 tablet, Rfl: 3    magnesium oxide (MAG-OX) 400 mg, 1 tab in a m   Daily (Patient not taking: Reported on 1/17/2020), Disp: 30 tablet, Rfl: 6    multivitamin (THERAGRAN) TABS, Take 1 tablet by mouth daily , Disp: , Rfl:     OLANZapine (ZYPREXA) 5 mg tablet, Take 5 mg by mouth daily at bedtime, Disp: , Rfl:     prochlorperazine (COMPAZINE) 10 mg tablet, 1 tab at onset of migraine, can repeat in 8 hours, can take with triptan/NSAID (Patient not taking: Reported on 8/9/2019), Disp: 20 tablet, Rfl: 3    Riboflavin (CVS VITAMIN B-2) 100 MG TABS, 2 tabs in a m  2 tabs in p m  (Patient not taking: Reported on 1/17/2020), Disp: 120 tablet, Rfl: 6    rizatriptan (MAXALT) 10 MG tablet, 1 at onset of a migraine headache May repeat every 2 hours if needed, max 20mg/24 hours (Patient not taking: Reported on 8/9/2019), Disp: 12 tablet, Rfl: 3    topiramate (TOPAMAX) 25 mg tablet, 1 tab at bedtime for 3 days then 2 tabs at bedtime for 3 days then 3 tabs at bedtime for 3 days then 4 tabs after that (Patient not taking: Reported on 1/17/2020), Disp: 120 tablet, Rfl: 2    venlafaxine (EFFEXOR-XR) 37 5 mg 24 hr capsule, 1 tab PO daily for 2 weeks, if tolerated increase to 2 tabs PO daily (Patient not taking: Reported on 1/17/2020), Disp: 60 capsule, Rfl: 1    No Known Allergies    Physical Exam:    /80   Ht 5' 5" (1 651 m)   Wt 83 9 kg (185 lb)   BMI 30 79 kg/m²     Constitutional:normal, well developed, well nourished, alert, in no distress and non-toxic and no overt pain behavior  Eyes:anicteric  HEENT:grossly intact  Neck:supple, symmetric, trachea midline and no masses   Pulmonary:even and unlabored  Cardiovascular:No edema or pitting edema present  Skin:Normal without rashes or lesions and well hydrated  Psychiatric:Mood and affect appropriate  Neurologic:Cranial Nerves II-XII grossly intact  Musculoskeletal:normal gait      Imaging  No orders to display         No orders of the defined types were placed in this encounter

## 2020-01-20 LAB — VZV IGG SER IA-ACNC: NORMAL

## 2020-01-21 LAB
MEV IGG SER QL: ABNORMAL
MUV IGG SER QL: ABNORMAL

## 2020-01-22 ENCOUNTER — TELEPHONE (OUTPATIENT)
Dept: NEUROLOGY | Facility: CLINIC | Age: 45
End: 2020-01-22

## 2020-01-22 ENCOUNTER — PATIENT MESSAGE (OUTPATIENT)
Dept: INTERNAL MEDICINE CLINIC | Facility: CLINIC | Age: 45
End: 2020-01-22

## 2020-01-22 NOTE — TELEPHONE ENCOUNTER
Spoke with patient to offer available appt on 2/20/2020 at 0730 am or 2/28/2020 at 0815 am  Patient will call back once she speaks with her manager at work  Patient is newly hired to [employer]   I will await patient's call back

## 2020-01-31 ENCOUNTER — APPOINTMENT (OUTPATIENT)
Dept: LAB | Facility: OTHER | Age: 45
End: 2020-01-31
Payer: COMMERCIAL

## 2020-01-31 ENCOUNTER — TELEPHONE (OUTPATIENT)
Dept: NEUROLOGY | Facility: CLINIC | Age: 45
End: 2020-01-31

## 2020-01-31 ENCOUNTER — TRANSCRIBE ORDERS (OUTPATIENT)
Dept: LAB | Facility: OTHER | Age: 45
End: 2020-01-31

## 2020-01-31 DIAGNOSIS — Z01.84 IMMUNITY STATUS TESTING: ICD-10-CM

## 2020-01-31 DIAGNOSIS — Z01.84 IMMUNITY STATUS TESTING: Primary | ICD-10-CM

## 2020-01-31 PROCEDURE — 86765 RUBEOLA ANTIBODY: CPT

## 2020-01-31 PROCEDURE — 36415 COLL VENOUS BLD VENIPUNCTURE: CPT

## 2020-01-31 PROCEDURE — 86735 MUMPS ANTIBODY: CPT

## 2020-01-31 NOTE — TELEPHONE ENCOUNTER
Patient is scheduled with [facility] on 2/19/2020 [facility]

## 2020-01-31 NOTE — TELEPHONE ENCOUNTER
Type Date User Summary Attachment   General 01/28/2020  3:16 PM Marcia Bell care coordination  -   Note    Botox- authorization #: [id number]- valid from 8/2/2019 until 8/2/2020   Please use [facility]      Thank you,     [facility]

## 2020-02-03 NOTE — TELEPHONE ENCOUNTER
Called [facility]- spoke with Milton Vo- I advised her that I need to initiate a refill on the patient's Botox prescription  Refill request initiated  Order marked as STAT as this is needed by next week  Will do a status check in 2 days

## 2020-02-04 LAB
MEV IGG SER QL: ABNORMAL
MUV IGG SER QL: ABNORMAL

## 2020-02-06 NOTE — TELEPHONE ENCOUNTER
Attempted to contact the patient- lmom awaiting a call back  When patient calls back please advise her that her Botox order is ready to be scheduled for delivery  Patient will need to call [facility] and provide her consent to viviana and josue her co-pay  Please provide the patient with the phone number listed below      [facility]: [phone]

## 2020-02-06 NOTE — TELEPHONE ENCOUNTER
Called [facility]- spoke with Vanna Long- she states the patient's Botox order is ready to be scheduled for delivery  Patient's consent is not on file  They attempted to contact the patient yesterday  Will contact the patient today to call and give consent so we can set up the delivery

## 2020-02-06 NOTE — TELEPHONE ENCOUNTER
Called and spoke with the patient- she advised me she called and gave consent to ship yesterday after work  I advised her that I would call back and make sure that's on file- if not I would conference her into our call so she provide her consent to ship again  Patient verbally agreed  [address] spoke with Yinka Arauz- she advised me the patient's Botox order is ready to be scheduled for delivery  Patient's consent is on file  Botox to be delivered on Friday 2/7/2020 to the [facility] location via [facility] overnight- a signature will be required  Orion Marti,    Please await Botox delivery and document once it has arrived  Please let me know if you do not receive the patient's Botox order      Thank you,    Oscar Veloz

## 2020-02-06 NOTE — TELEPHONE ENCOUNTER
Received a voicemail from the patient- she states she received my call and states she is working until 3:30 today  She is requesting I give her a call back after 3:30  Will contact the patient before I leave the office today

## 2020-02-07 NOTE — TELEPHONE ENCOUNTER
Botox number of units: 200 units  Botox quantity: 1  Arrived at what location: [facility]   Lot number: [id number]

## 2020-02-17 ENCOUNTER — OFFICE VISIT (OUTPATIENT)
Dept: INTERNAL MEDICINE CLINIC | Facility: CLINIC | Age: 45
End: 2020-02-17
Payer: COMMERCIAL

## 2020-02-17 VITALS
WEIGHT: 185 LBS | HEART RATE: 80 BPM | BODY MASS INDEX: 30.82 KG/M2 | TEMPERATURE: 98.8 F | OXYGEN SATURATION: 97 % | HEIGHT: 65 IN | DIASTOLIC BLOOD PRESSURE: 80 MMHG | SYSTOLIC BLOOD PRESSURE: 116 MMHG

## 2020-02-17 DIAGNOSIS — N30.01 ACUTE CYSTITIS WITH HEMATURIA: Primary | ICD-10-CM

## 2020-02-17 DIAGNOSIS — R35.0 URINARY FREQUENCY: ICD-10-CM

## 2020-02-17 LAB
SL AMB  POCT GLUCOSE, UA: ABNORMAL
SL AMB LEUKOCYTE ESTERASE,UA: ABNORMAL
SL AMB POCT BILIRUBIN,UA: ABNORMAL
SL AMB POCT BLOOD,UA: ABNORMAL
SL AMB POCT CLARITY,UA: ABNORMAL
SL AMB POCT COLOR,UA: YELLOW
SL AMB POCT KETONES,UA: ABNORMAL
SL AMB POCT NITRITE,UA: POSITIVE
SL AMB POCT PH,UA: 6
SL AMB POCT SPECIFIC GRAVITY,UA: 1.01
SL AMB POCT URINE PROTEIN: ABNORMAL
SL AMB POCT UROBILINOGEN: 0.2

## 2020-02-17 PROCEDURE — 1036F TOBACCO NON-USER: CPT | Performed by: NURSE PRACTITIONER

## 2020-02-17 PROCEDURE — 99213 OFFICE O/P EST LOW 20 MIN: CPT | Performed by: NURSE PRACTITIONER

## 2020-02-17 PROCEDURE — 3008F BODY MASS INDEX DOCD: CPT | Performed by: NURSE PRACTITIONER

## 2020-02-17 PROCEDURE — 81003 URINALYSIS AUTO W/O SCOPE: CPT | Performed by: NURSE PRACTITIONER

## 2020-02-17 RX ORDER — SULFAMETHOXAZOLE AND TRIMETHOPRIM 800; 160 MG/1; MG/1
1 TABLET ORAL EVERY 12 HOURS SCHEDULED
Qty: 6 TABLET | Refills: 0 | Status: SHIPPED | OUTPATIENT
Start: 2020-02-17 | End: 2020-02-20

## 2020-02-17 NOTE — PATIENT INSTRUCTIONS
Take full course of antibiotics  Would recommend OTC probiotic or yogurt to help protect the natural millie of your stomach  Make sure you drink plenty of water  There are certain practices to help reduce the frequency of UTIs  Make sure you wipe front to back to avoid contamination  Always urinate after intercourse  Avoid bubble baths  Shower after swimming in pool  Avoid tight fitting clothing and underwear like thongs

## 2020-02-17 NOTE — PROGRESS NOTES
Assessment/Plan:       Problem List Items Addressed This Visit        Genitourinary    Acute cystitis with hematuria - Primary     Start bactrim po bid x 3 days  Urine culture sent for cx and sensitivities           Relevant Medications    sulfamethoxazole-trimethoprim (BACTRIM DS) 800-160 mg per tablet    Other Relevant Orders    Urine culture      Other Visit Diagnoses     Urinary frequency        Relevant Orders    POCT urine dip auto non-scope (Completed)                 Subjective:      Patient ID: Juaquin Hernández is a 38 y o  female  Patient presents for an acute visit  She started last week with urinary frequency  She developed dysuria 3 days ago, as well as urgency  She denies fevers/chills, flank or back pain  She did take AZO for the past 2 days, but not today  Urinary Tract Infection    This is a new problem  The current episode started in the past 7 days  The problem occurs every urination  The problem has been gradually worsening  The quality of the pain is described as burning  There has been no fever  She is sexually active  There is no history of pyelonephritis  Associated symptoms include frequency, hesitancy and urgency  Pertinent negatives include no chills, discharge, flank pain, hematuria, nausea, possible pregnancy, sweats or vomiting  Treatments tried: Azo  The treatment provided moderate relief  The following portions of the patient's history were reviewed and updated as appropriate: allergies, current medications, past family history, past medical history, past social history, past surgical history and problem list     Review of Systems   Constitutional: Negative for chills and fever  HENT: Negative for trouble swallowing  Respiratory: Negative for shortness of breath  Cardiovascular: Negative for chest pain  Gastrointestinal: Negative for nausea and vomiting  Genitourinary: Positive for dysuria, frequency, hesitancy and urgency   Negative for decreased urine volume, difficulty urinating, flank pain, hematuria, pelvic pain and vaginal discharge  Musculoskeletal: Negative for gait problem  Skin: Negative for rash  Neurological: Negative for dizziness and headaches  Psychiatric/Behavioral: Negative for sleep disturbance  The patient is not nervous/anxious  Past Medical History:   Diagnosis Date    Anxiety     Depression     Migraine          Current Outpatient Medications:     Botulinum Toxin Type A 200 units SOLR, Inject up to 200 units IM by physician into the head and neck muscles every three months, Disp: 200 Units, Rfl: 4    gabapentin (NEURONTIN) 300 mg capsule, Take 1 PO AM and 2 PO dinner time, Disp: 90 capsule, Rfl: 2    ketorolac (TORADOL) 10 mg tablet, Three times a day for two days, Disp: 6 tablet, Rfl: 3    magnesium oxide (MAG-OX) 400 mg, 1 tab in a m   Daily, Disp: 30 tablet, Rfl: 6    multivitamin (THERAGRAN) TABS, Take 1 tablet by mouth daily , Disp: , Rfl:     Riboflavin ([employer] VITAMIN B-2) 100 MG TABS, 2 tabs in a m  2 tabs in p m , Disp: 120 tablet, Rfl: 6    rizatriptan (MAXALT) 10 MG tablet, 1 at onset of a migraine headache May repeat every 2 hours if needed, max 20mg/24 hours, Disp: 12 tablet, Rfl: 3    topiramate (TOPAMAX) 25 mg tablet, 1 tab at bedtime for 3 days then 2 tabs at bedtime for 3 days then 3 tabs at bedtime for 3 days then 4 tabs after that, Disp: 120 tablet, Rfl: 2    TRANSDERM-SCOP, 1 5 MG, 1 MG/3DAYS TD 72 hr patch, APPLY ONE EVERY THREE DAYS AS NEEDED, Disp: , Rfl: 2    cyanocobalamin (VITAMIN B-12) 100 mcg tablet, Take 100 mcg by mouth daily, Disp: , Rfl:     cyclobenzaprine (FLEXERIL) 10 mg tablet, 1 tab at bedtime for 10 days then 1 tab twice a day after that (Patient not taking: Reported on 2/17/2020), Disp: 60 tablet, Rfl: 3    OLANZapine (ZYPREXA) 5 mg tablet, Take 5 mg by mouth daily at bedtime, Disp: , Rfl:     prochlorperazine (COMPAZINE) 10 mg tablet, 1 tab at onset of migraine, can repeat in 8 hours, can take with triptan/NSAID (Patient not taking: Reported on 2020), Disp: 20 tablet, Rfl: 3    sulfamethoxazole-trimethoprim (BACTRIM DS) 800-160 mg per tablet, Take 1 tablet by mouth every 12 (twelve) hours for 3 days, Disp: 6 tablet, Rfl: 0    venlafaxine (EFFEXOR-XR) 37 5 mg 24 hr capsule, 1 tab PO daily for 2 weeks, if tolerated increase to 2 tabs PO daily (Patient not taking: Reported on 2020), Disp: 60 capsule, Rfl: 1    No Known Allergies    Social History   Past Surgical History:   Procedure Laterality Date    TUBAL LIGATION       Family History   Problem Relation Age of Onset    Hypertension Mother     Hypertension Father     No Known Problems Sister     COPD Maternal Grandmother     No Known Problems Son     No Known Problems Daughter     No Known Problems Sister     Lung cancer Paternal Grandmother     Heart disease Paternal Grandfather     Stroke Paternal [de-identified]          after being bedridden from Stroke few years after initial stroke in     No Known Problems Maternal Aunt     Mental illness Maternal Aunt     Mental illness Maternal Aunt     No Known Problems Maternal Uncle     Lung cancer Paternal Aunt     No Known Problems Paternal Aunt     No Known Problems Paternal Aunt     Mental illness Paternal Aunt     Stroke Paternal Uncle     Stroke Paternal Uncle          after being bedridden for approx 2 years       Objective:  /80 (BP Location: Left arm, Patient Position: Sitting, Cuff Size: Adult)   Pulse 80   Temp 98 8 °F (37 1 °C) (Oral)   Ht 5' 5" (1 651 m)   Wt 83 9 kg (185 lb)   SpO2 97%   BMI 30 79 kg/m²        Physical Exam   Constitutional: She is oriented to person, place, and time  She appears well-developed and well-nourished  No distress  HENT:   Head: Normocephalic and atraumatic  Right Ear: External ear normal    Left Ear: External ear normal    Mouth/Throat: No oropharyngeal exudate     Eyes: Pupils are equal, round, and reactive to light  No scleral icterus  Neck: Normal range of motion  Neck supple  Cardiovascular: Normal rate and regular rhythm  Pulmonary/Chest: Effort normal and breath sounds normal    Abdominal: Soft  There is no tenderness  Musculoskeletal: Normal range of motion  She exhibits no edema  Lymphadenopathy:     She has no cervical adenopathy  Neurological: She is alert and oriented to person, place, and time  Skin: Skin is warm  Psychiatric: She has a normal mood and affect   Her behavior is normal

## 2020-02-19 ENCOUNTER — PROCEDURE VISIT (OUTPATIENT)
Dept: NEUROLOGY | Facility: CLINIC | Age: 45
End: 2020-02-19
Payer: COMMERCIAL

## 2020-02-19 VITALS — TEMPERATURE: 98.6 F | DIASTOLIC BLOOD PRESSURE: 67 MMHG | SYSTOLIC BLOOD PRESSURE: 110 MMHG | HEART RATE: 86 BPM

## 2020-02-19 DIAGNOSIS — G43.709 CHRONIC MIGRAINE WITHOUT AURA WITHOUT STATUS MIGRAINOSUS, NOT INTRACTABLE: Primary | ICD-10-CM

## 2020-02-19 PROCEDURE — 64615 CHEMODENERV MUSC MIGRAINE: CPT | Performed by: PHYSICIAN ASSISTANT

## 2020-02-19 NOTE — PROGRESS NOTES
Chemodenervation  Date/Time: 2/19/2020 1:11 PM  Performed by: Arline De Santiago PA-C  Authorized by: Arline De Santiago PA-C     Pre-procedure details:     Prepped With: Alcohol    Anesthesia (see MAR for exact dosages): Anesthesia method:  None  Procedure details:     Position:  Upright  Botox:     Botox Type:  Type A    Brand:  Botox    mL's of Botulinum Toxin:  200    Final Concentration per CC:  200 units    Needle Gauge:  30 G 2 5 inch  Procedures:     Botox Procedures: chronic headache      Indications: migraines    Injection Location:     Head / Face:  L superior cervical paraspinal, R superior cervical paraspinal, L , R , L frontalis, R frontalis, L medial occipitalis, R medial occipitalis, procerus, R temporalis, L temporalis, R superior trapezius and L superior trapezius    L  injection amount:  5 unit(s)    R  injection amount:  5 unit(s)    L lateral frontalis:  5 unit(s)    R lateral frontalis:  5 unit(s)    L medial frontalis:  5 unit(s)    R medial frontalis:  5 unit(s)    L temporalis injection amount:  20 unit(s)    R temporalis injection amount:  20 unit(s)    Procerus injection amount:  5 unit(s)    L medial occipitalis injection amount:  15 unit(s)    R medial occipitalis injection amount:  15 unit(s)    L superior cervical paraspinal injection amount:  10 unit(s)    R superior cervical paraspinal injection amount:  10 unit(s)    L superior trapezius injection amount:  15 unit(s)    R superior trapezius injection amount:  15 unit(s)  Total Units:     Total units used:  200    Total units discarded:  0  Post-procedure details:     Chemodenervation:  Chronic migraine    Facial Nerve Location[de-identified]  Bilateral facial nerve    Patient tolerance of procedure:   Tolerated well, no immediate complications  Comments:      5 units orbicularis oculi bilaterally  35 units frontalis  All medically necessary

## 2020-02-20 DIAGNOSIS — N30.01 ACUTE CYSTITIS WITH HEMATURIA: Primary | ICD-10-CM

## 2020-02-20 RX ORDER — NITROFURANTOIN 25; 75 MG/1; MG/1
100 CAPSULE ORAL 2 TIMES DAILY
Qty: 10 CAPSULE | Refills: 0 | Status: SHIPPED | OUTPATIENT
Start: 2020-02-20 | End: 2020-02-25

## 2020-02-24 ENCOUNTER — TELEPHONE (OUTPATIENT)
Dept: INTERNAL MEDICINE CLINIC | Facility: CLINIC | Age: 45
End: 2020-02-24

## 2020-02-24 NOTE — TELEPHONE ENCOUNTER
Pt cx appt via My Chart  Pt has hx of cancellations  Prev PCP is still listed as her PCP  Pt established care w Dr Renato Marshall on 08/196/2019  Called pt to reschedule 6mo f/u w Dr Renato Marshall but had to [facility]  Also asked to confirm if she will continue being our pt at [facility]

## 2020-03-13 ENCOUNTER — OFFICE VISIT (OUTPATIENT)
Dept: PAIN MEDICINE | Facility: CLINIC | Age: 45
End: 2020-03-13
Payer: COMMERCIAL

## 2020-03-13 VITALS
WEIGHT: 185 LBS | BODY MASS INDEX: 30.82 KG/M2 | HEART RATE: 83 BPM | SYSTOLIC BLOOD PRESSURE: 111 MMHG | HEIGHT: 65 IN | DIASTOLIC BLOOD PRESSURE: 77 MMHG

## 2020-03-13 DIAGNOSIS — M54.12 CERVICAL RADICULOPATHY: Primary | ICD-10-CM

## 2020-03-13 PROCEDURE — 1036F TOBACCO NON-USER: CPT | Performed by: NURSE PRACTITIONER

## 2020-03-13 PROCEDURE — 99214 OFFICE O/P EST MOD 30 MIN: CPT | Performed by: NURSE PRACTITIONER

## 2020-03-13 PROCEDURE — 3008F BODY MASS INDEX DOCD: CPT | Performed by: NURSE PRACTITIONER

## 2020-03-13 RX ORDER — DULOXETIN HYDROCHLORIDE 30 MG/1
30 CAPSULE, DELAYED RELEASE ORAL DAILY
Qty: 30 CAPSULE | Refills: 1 | Status: SHIPPED | OUTPATIENT
Start: 2020-03-13 | End: 2020-05-06

## 2020-03-13 NOTE — PATIENT INSTRUCTIONS
Duloxetine (By mouth)   Duloxetine (doo-LOX-[id number]-teen)  Treats depression, anxiety, diabetic peripheral neuropathy, fibromyalgia, and chronic muscle or bone pain  This medicine is an SSNRI  Brand Name(s): Cymbalta, DermacinRx Salomon Moran   There may be other brand names for this medicine  When This Medicine Should Not Be Used: This medicine is not right for everyone  Do not use it if you had an allergic reaction to duloxetine  How to Use This Medicine:   Capsule, Delayed Release Capsule  · Take your medicine as directed  Your dose may need to be changed several times to find what works best for you  · Delayed-release capsule: Swallow the capsule whole  Do not crush, chew, break, or open it  · This medicine should come with a Medication Guide  Ask your pharmacist for a copy if you do not have one  · Missed dose: Take a dose as soon as you remember  If it is almost time for your next dose, wait until then and take a regular dose  Do not take extra medicine to make up for a missed dose  · Store the medicine in a closed container at room temperature, away from heat, moisture, and direct light  Drugs and Foods to Avoid:   Ask your doctor or pharmacist before using any other medicine, including over-the-counter medicines, vitamins, and herbal products  · Do not take duloxetine if you have used an MAO inhibitor (MAOI) within the past 14 days  Do not start taking an MAO inhibitor within 5 days of stopping duloxetine  · Some medicines can affect how duloxetine works   Tell your doctor if you are using any of the following:  ¨ Buspirone, cimetidine, ciprofloxacin, enoxacin, fentanyl, lithium, Yessi's wort, theophylline, tramadol, tryptophan, or warfarin  ¨ Amphetamines  ¨ Blood pressure medicine  ¨ Diuretic (water pill)  ¨ Medicine for heart rhythm problems (including flecainide, propafenone, quinidine)  ¨ Medicine to treat migraine headaches (including triptans)  ¨ NSAID pain or arthritis medicine (including aspirin, celecoxib, diclofenac, ibuprofen, naproxen)  ¨ Other medicine to treat depression or mood disorders (including amitriptyline, desipramine, fluoxetine, imipramine, nortriptyline, paroxetine)  ¨ Phenothiazine medicine (including thioridazine)  · Tell your doctor if you use anything else that makes you sleepy  Some examples are allergy medicine, narcotic pain medicine, and alcohol  · Do not drink alcohol while you are using this medicine  Warnings While Using This Medicine:   · Tell your doctor if you are pregnant or breastfeeding, or if you have kidney disease, liver disease, diabetes, digestion problems, glaucoma, heart disease, high or low blood pressure, or problems with urination  Tell your doctor if you smoke or you have a history of seizures, or drug or alcohol addiction  · This medicine may cause the following problems:   ¨ Serious liver problems  ¨ Serotonin syndrome (more likely when used with certain other medicines)  ¨ Increased risk of bleeding problems  ¨ Serious skin reactions  ¨ Low sodium levels in the blood  · This medicine can increase thoughts of suicide  Tell your doctor right away if you start to feel depressed and have thoughts about hurting yourself  · This medicine can cause changes in your blood pressure  This may make you dizzy or drowsy  Do not drive or do anything that could be dangerous until you know how this medicine affects you  Stand up slowly to avoid falls  · Do not stop using this medicine suddenly  Your doctor will need to slowly decrease your dose before you stop it completely  · Your doctor will check your progress and the effects of this medicine at regular visits  Keep all appointments  · Keep all medicine out of the reach of children  Never share your medicine with anyone    Possible Side Effects While Using This Medicine:   Call your doctor right away if you notice any of these side effects:  · Allergic reaction: Itching or hives, swelling in your face or hands, swelling or tingling in your mouth or throat, chest tightness, trouble breathing  · Anxiety, restlessness, fever, fast heartbeat, sweating, muscle spasms, diarrhea, seeing or hearing things that are not there  · Blistering, peeling, red skin rash  · Confusion, weakness, muscle twitching  · Dark urine or pale stools, nausea, vomiting, loss of appetite, stomach pain, yellow skin or eyes  · Decrease in how much or how often you urinate  · Eye pain, vision changes, seeing halos around lights  · Feeling more energetic than usual  · Lightheadedness, dizziness, or fainting  · Unusual moods or behaviors, worsening depression, thoughts about hurting yourself, trouble sleeping  · Unusual bleeding or bruising  If you notice these less serious side effects, talk with your doctor:   · Decrease in appetite or weight  · Dry mouth, constipation, mild nausea  · Unusual drowsiness, sleepiness, or tiredness  If you notice other side effects that you think are caused by this medicine, tell your doctor  Call your doctor for medical advice about side effects  You may report side effects to FDA at 9-591-FDA-8115  © 2017 [facility] Information is for End User's use only and may not be sold, redistributed or otherwise used for commercial purposes  The above information is an  only  It is not intended as medical advice for individual conditions or treatments  Talk to your doctor, nurse or pharmacist before following any medical regimen to see if it is safe and effective for you

## 2020-04-10 ENCOUNTER — NURSE TRIAGE (OUTPATIENT)
Dept: OTHER | Facility: OTHER | Age: 45
End: 2020-04-10

## 2020-04-10 DIAGNOSIS — Z20.828 EXPOSURE TO SARS-ASSOCIATED CORONAVIRUS: ICD-10-CM

## 2020-04-10 DIAGNOSIS — Z20.828 EXPOSURE TO SARS-ASSOCIATED CORONAVIRUS: Primary | ICD-10-CM

## 2020-04-10 PROCEDURE — 87635 SARS-COV-2 COVID-19 AMP PRB: CPT

## 2020-04-11 LAB
INPATIENT: NORMAL
SARS-COV-2 RNA SPEC QL NAA+PROBE: NOT DETECTED

## 2020-04-13 ENCOUNTER — TELEPHONE (OUTPATIENT)
Dept: INTERNAL MEDICINE CLINIC | Facility: CLINIC | Age: 45
End: 2020-04-13

## 2020-04-14 ENCOUNTER — TELEPHONE (OUTPATIENT)
Dept: NEUROLOGY | Facility: CLINIC | Age: 45
End: 2020-04-14

## 2020-04-22 ENCOUNTER — OFFICE VISIT (OUTPATIENT)
Dept: URGENT CARE | Age: 45
End: 2020-04-22
Payer: COMMERCIAL

## 2020-04-22 ENCOUNTER — APPOINTMENT (OUTPATIENT)
Dept: RADIOLOGY | Age: 45
End: 2020-04-22
Payer: COMMERCIAL

## 2020-04-22 ENCOUNTER — TELEMEDICINE (OUTPATIENT)
Dept: NEUROLOGY | Facility: CLINIC | Age: 45
End: 2020-04-22
Payer: COMMERCIAL

## 2020-04-22 VITALS
SYSTOLIC BLOOD PRESSURE: 142 MMHG | RESPIRATION RATE: 20 BRPM | OXYGEN SATURATION: 98 % | DIASTOLIC BLOOD PRESSURE: 54 MMHG | HEIGHT: 65 IN | BODY MASS INDEX: 29.99 KG/M2 | WEIGHT: 180 LBS | HEART RATE: 91 BPM | TEMPERATURE: 98 F

## 2020-04-22 VITALS — WEIGHT: 182 LBS | BODY MASS INDEX: 30.32 KG/M2 | HEIGHT: 65 IN

## 2020-04-22 DIAGNOSIS — R05.9 COUGH: ICD-10-CM

## 2020-04-22 DIAGNOSIS — J01.40 ACUTE PANSINUSITIS, RECURRENCE NOT SPECIFIED: Primary | ICD-10-CM

## 2020-04-22 DIAGNOSIS — G43.709 CHRONIC MIGRAINE WITHOUT AURA WITHOUT STATUS MIGRAINOSUS, NOT INTRACTABLE: ICD-10-CM

## 2020-04-22 PROCEDURE — 99213 OFFICE O/P EST LOW 20 MIN: CPT | Performed by: PHYSICIAN ASSISTANT

## 2020-04-22 PROCEDURE — G0382 LEV 3 HOSP TYPE B ED VISIT: HCPCS | Performed by: PHYSICIAN ASSISTANT

## 2020-04-22 PROCEDURE — 71046 X-RAY EXAM CHEST 2 VIEWS: CPT

## 2020-04-22 RX ORDER — BENZONATATE 100 MG/1
100 CAPSULE ORAL 3 TIMES DAILY PRN
Qty: 15 CAPSULE | Refills: 0 | Status: SHIPPED | OUTPATIENT
Start: 2020-04-22 | End: 2021-04-26 | Stop reason: ALTCHOICE

## 2020-04-22 RX ORDER — RIZATRIPTAN BENZOATE 10 MG/1
TABLET ORAL
Qty: 12 TABLET | Refills: 3 | Status: SHIPPED | OUTPATIENT
Start: 2020-04-22 | End: 2022-04-20

## 2020-04-22 RX ORDER — ALBUTEROL SULFATE 90 UG/1
2 AEROSOL, METERED RESPIRATORY (INHALATION) EVERY 6 HOURS PRN
Qty: 1 INHALER | Refills: 0 | Status: SHIPPED | OUTPATIENT
Start: 2020-04-22 | End: 2021-04-26 | Stop reason: ALTCHOICE

## 2020-04-22 RX ORDER — CETIRIZINE HYDROCHLORIDE 10 MG/1
10 TABLET ORAL DAILY
Qty: 30 TABLET | Refills: 0 | Status: SHIPPED | OUTPATIENT
Start: 2020-04-22 | End: 2021-04-26 | Stop reason: ALTCHOICE

## 2020-04-22 RX ORDER — AMOXICILLIN AND CLAVULANATE POTASSIUM 875; 125 MG/1; MG/1
1 TABLET, FILM COATED ORAL EVERY 12 HOURS SCHEDULED
Qty: 14 TABLET | Refills: 0 | Status: SHIPPED | OUTPATIENT
Start: 2020-04-22 | End: 2020-04-29

## 2020-04-22 RX ORDER — FLUTICASONE PROPIONATE 50 MCG
2 SPRAY, SUSPENSION (ML) NASAL DAILY
Qty: 16 G | Refills: 0 | Status: SHIPPED | OUTPATIENT
Start: 2020-04-22 | End: 2021-04-26 | Stop reason: ALTCHOICE

## 2020-04-22 RX ORDER — PROCHLORPERAZINE MALEATE 10 MG
TABLET ORAL
Qty: 10 TABLET | Refills: 3 | Status: SHIPPED | OUTPATIENT
Start: 2020-04-22 | End: 2022-04-20

## 2020-04-23 ENCOUNTER — DOCUMENTATION (OUTPATIENT)
Dept: NEUROLOGY | Facility: CLINIC | Age: 45
End: 2020-04-23

## 2020-04-28 ENCOUNTER — TELEPHONE (OUTPATIENT)
Dept: PAIN MEDICINE | Facility: CLINIC | Age: 45
End: 2020-04-28

## 2020-05-03 DIAGNOSIS — M54.12 CERVICAL RADICULOPATHY: ICD-10-CM

## 2020-05-04 RX ORDER — DULOXETIN HYDROCHLORIDE 30 MG/1
CAPSULE, DELAYED RELEASE ORAL
Qty: 30 CAPSULE | Refills: 1 | OUTPATIENT
Start: 2020-05-04

## 2020-05-06 ENCOUNTER — TELEMEDICINE (OUTPATIENT)
Dept: PAIN MEDICINE | Facility: CLINIC | Age: 45
End: 2020-05-06
Payer: COMMERCIAL

## 2020-05-06 DIAGNOSIS — M54.12 CERVICAL RADICULOPATHY: Primary | ICD-10-CM

## 2020-05-06 DIAGNOSIS — M79.18 MYOFASCIAL PAIN SYNDROME: ICD-10-CM

## 2020-05-06 PROCEDURE — 99214 OFFICE O/P EST MOD 30 MIN: CPT | Performed by: NURSE PRACTITIONER

## 2020-05-06 RX ORDER — TIZANIDINE 2 MG/1
2 TABLET ORAL EVERY 8 HOURS PRN
Qty: 90 TABLET | Refills: 0 | Status: SHIPPED | OUTPATIENT
Start: 2020-05-06 | End: 2021-04-26

## 2020-05-06 RX ORDER — DULOXETIN HYDROCHLORIDE 60 MG/1
60 CAPSULE, DELAYED RELEASE ORAL DAILY
Qty: 30 CAPSULE | Refills: 0 | Status: SHIPPED | OUTPATIENT
Start: 2020-05-06 | End: 2020-06-04 | Stop reason: SDUPTHER

## 2020-05-07 DIAGNOSIS — J01.40 ACUTE PANSINUSITIS, RECURRENCE NOT SPECIFIED: ICD-10-CM

## 2020-05-07 RX ORDER — ALBUTEROL SULFATE 90 UG/1
AEROSOL, METERED RESPIRATORY (INHALATION)
Qty: 6.7 INHALER | Refills: 0 | OUTPATIENT
Start: 2020-05-07

## 2020-05-12 DIAGNOSIS — J01.40 ACUTE PANSINUSITIS, RECURRENCE NOT SPECIFIED: ICD-10-CM

## 2020-05-12 RX ORDER — FLUTICASONE PROPIONATE 50 MCG
SPRAY, SUSPENSION (ML) NASAL
Qty: 16 ML | Refills: 0 | OUTPATIENT
Start: 2020-05-12

## 2020-05-21 ENCOUNTER — TELEPHONE (OUTPATIENT)
Dept: NEUROLOGY | Facility: CLINIC | Age: 45
End: 2020-05-21

## 2020-05-28 ENCOUNTER — PROCEDURE VISIT (OUTPATIENT)
Dept: NEUROLOGY | Facility: CLINIC | Age: 45
End: 2020-05-28
Payer: COMMERCIAL

## 2020-05-28 VITALS — DIASTOLIC BLOOD PRESSURE: 80 MMHG | TEMPERATURE: 98.6 F | SYSTOLIC BLOOD PRESSURE: 112 MMHG

## 2020-05-28 DIAGNOSIS — G43.709 CHRONIC MIGRAINE WITHOUT AURA WITHOUT STATUS MIGRAINOSUS, NOT INTRACTABLE: Primary | ICD-10-CM

## 2020-05-28 PROCEDURE — 64615 CHEMODENERV MUSC MIGRAINE: CPT | Performed by: PHYSICIAN ASSISTANT

## 2020-06-02 ENCOUNTER — TELEPHONE (OUTPATIENT)
Dept: PAIN MEDICINE | Facility: MEDICAL CENTER | Age: 45
End: 2020-06-02

## 2020-06-04 ENCOUNTER — TELEMEDICINE (OUTPATIENT)
Dept: PAIN MEDICINE | Facility: CLINIC | Age: 45
End: 2020-06-04
Payer: COMMERCIAL

## 2020-06-04 ENCOUNTER — TELEPHONE (OUTPATIENT)
Dept: PAIN MEDICINE | Facility: CLINIC | Age: 45
End: 2020-06-04

## 2020-06-04 DIAGNOSIS — M54.12 CERVICAL RADICULOPATHY: Primary | ICD-10-CM

## 2020-06-04 DIAGNOSIS — M79.18 MYOFASCIAL PAIN SYNDROME: ICD-10-CM

## 2020-06-04 PROCEDURE — 99442 PR PHYS/QHP TELEPHONE EVALUATION 11-20 MIN: CPT | Performed by: NURSE PRACTITIONER

## 2020-06-04 RX ORDER — DULOXETIN HYDROCHLORIDE 60 MG/1
60 CAPSULE, DELAYED RELEASE ORAL DAILY
Qty: 30 CAPSULE | Refills: 1 | Status: SHIPPED | OUTPATIENT
Start: 2020-06-04 | End: 2020-07-28 | Stop reason: SDUPTHER

## 2020-07-02 ENCOUNTER — TELEPHONE (OUTPATIENT)
Dept: PAIN MEDICINE | Facility: CLINIC | Age: 45
End: 2020-07-02

## 2020-07-02 ENCOUNTER — APPOINTMENT (OUTPATIENT)
Dept: RADIOLOGY | Age: 45
End: 2020-07-02
Payer: COMMERCIAL

## 2020-07-02 DIAGNOSIS — M53.3 COCCYDYNIA: Primary | ICD-10-CM

## 2020-07-02 DIAGNOSIS — M53.3 COCCYDYNIA: ICD-10-CM

## 2020-07-02 PROCEDURE — 72220 X-RAY EXAM SACRUM TAILBONE: CPT

## 2020-07-02 NOTE — TELEPHONE ENCOUNTER
Left a detailed message as per release of health information, advising pt the same  Advised if she goes to [address] the order is in the computer for them to see and if she goes outside of [address] she needs a copy of of the order  C/B # provided for any questions

## 2020-07-02 NOTE — TELEPHONE ENCOUNTER
Pt fell and hurt her tailbone and wants to know if she should have an xray done the pain is 7-8/10      Pt would like a call back from nurse pt ask that you call her back around 2:45pm -3pm  [phone]

## 2020-07-02 NOTE — TELEPHONE ENCOUNTER
Took call transferred from phone room, S/W pt  Advised her of the same  Pt verbalized understanding

## 2020-07-06 NOTE — TELEPHONE ENCOUNTER
Pt called in get her XRAY results from her Dr Leobardo Campa  Please be advise thank you        Please call patient back @ [phone]

## 2020-07-08 ENCOUNTER — TELEPHONE (OUTPATIENT)
Dept: PAIN MEDICINE | Facility: CLINIC | Age: 45
End: 2020-07-08

## 2020-07-08 ENCOUNTER — HOSPITAL ENCOUNTER (OUTPATIENT)
Dept: RADIOLOGY | Facility: CLINIC | Age: 45
Discharge: HOME | End: 2020-07-08
Attending: ANESTHESIOLOGY
Payer: COMMERCIAL

## 2020-07-08 VITALS
RESPIRATION RATE: 18 BRPM | SYSTOLIC BLOOD PRESSURE: 116 MMHG | HEART RATE: 82 BPM | DIASTOLIC BLOOD PRESSURE: 84 MMHG | TEMPERATURE: 98.5 F | OXYGEN SATURATION: 97 %

## 2020-07-08 DIAGNOSIS — M54.12 CERVICAL RADICULOPATHY: ICD-10-CM

## 2020-07-08 DIAGNOSIS — M53.3 COCCYDYNIA: Primary | ICD-10-CM

## 2020-07-08 PROCEDURE — 62321 NJX INTERLAMINAR CRV/THRC: CPT | Performed by: ANESTHESIOLOGY

## 2020-07-08 RX ORDER — PAPAVERINE HCL 150 MG
10 CAPSULE, EXTENDED RELEASE ORAL ONCE
Status: COMPLETED | OUTPATIENT
Start: 2020-07-08 | End: 2020-07-08

## 2020-07-08 RX ORDER — LIDOCAINE HYDROCHLORIDE 10 MG/ML
5 INJECTION, SOLUTION EPIDURAL; INFILTRATION; INTRACAUDAL; PERINEURAL ONCE
Status: COMPLETED | OUTPATIENT
Start: 2020-07-08 | End: 2020-07-08

## 2020-07-08 RX ADMIN — IOHEXOL 1 ML: 300 INJECTION, SOLUTION INTRAVENOUS at 08:17

## 2020-07-08 RX ADMIN — LIDOCAINE HYDROCHLORIDE 5 ML: 10 INJECTION, SOLUTION EPIDURAL; INFILTRATION; INTRACAUDAL; PERINEURAL at 08:16

## 2020-07-08 RX ADMIN — DEXAMETHASONE SODIUM PHOSPHATE 10 MG: 10 INJECTION, SOLUTION INTRAMUSCULAR; INTRAVENOUS at 08:18

## 2020-07-08 NOTE — H&P
History of Present Illness: The patient is a 41 y o  female who presents with complaints of neck and arm pain      Patient Active Problem List   Diagnosis    Vitamin D deficiency    Chronic migraine without aura without status migrainosus, not intractable    Left arm weakness    Anxiety and depression    Cervical radiculopathy    Trigeminal autonomic cephalgias    Peripheral neuropathy    Positive cardiolipin antibodies    Motion sickness    BMI 30 0-30 9,adult    Acute cystitis with hematuria    Myofascial pain syndrome       Past Medical History:   Diagnosis Date    Anxiety     Depression     Migraine        Past Surgical History:   Procedure Laterality Date    TUBAL LIGATION           Current Outpatient Medications:     albuterol (PROVENTIL HFA,VENTOLIN HFA) 90 mcg/act inhaler, Inhale 2 puffs every 6 (six) hours as needed for wheezing, Disp: 1 Inhaler, Rfl: 0    benzonatate (TESSALON PERLES) 100 mg capsule, Take 1 capsule (100 mg total) by mouth 3 (three) times a day as needed for cough, Disp: 15 capsule, Rfl: 0    Botulinum Toxin Type A 200 units SOLR, Inject up to 200 units IM by physician into the head and neck muscles every three months, Disp: 200 Units, Rfl: 4    cetirizine (ZyrTEC) 10 mg tablet, Take 1 tablet (10 mg total) by mouth daily, Disp: 30 tablet, Rfl: 0    DULoxetine (CYMBALTA) 60 mg delayed release capsule, Take 1 capsule (60 mg total) by mouth daily, Disp: 30 capsule, Rfl: 1    fluticasone (FLONASE) 50 mcg/act nasal spray, 2 sprays into each nostril daily, Disp: 16 g, Rfl: 0    prochlorperazine (COMPAZINE) 10 mg tablet, 1 tab at onset of migraine, can repeat in 8 hours, can take with triptan/NSAID, Disp: 10 tablet, Rfl: 3    rizatriptan (MAXALT) 10 MG tablet, 1 at onset of a migraine headache May repeat every 2 hours if needed, max 20mg/24 hours, Disp: 12 tablet, Rfl: 3    tiZANidine (ZANAFLEX) 2 mg tablet, Take 1 tablet (2 mg total) by mouth every 8 (eight) hours as needed for muscle spasms, Disp: 90 tablet, Rfl: 0    No Known Allergies    Physical Exam:   Vitals:    07/08/20 0800   BP: 122/80   Pulse: 82   Resp: 18   Temp: 98 5 °F (36 9 °C)   SpO2: 98%     General: Awake, Alert, Oriented x 3, Mood and affect appropriate  Respiratory: Respirations even and unlabored  Cardiovascular: Peripheral pulses intact; no edema  Musculoskeletal Exam:  Bilateral cervical paraspinals tender to palpation  ASA Score: 2    Patient/Chart Verification  Patient ID Verified: Verbal  ID Band Applied: No  Consents Confirmed: Procedural  H&P( within 30 days) Verified: To be obtained in the Pre-Procedure area  Interval H&P(within 24 hr) Complete (required for Outpatients and Surgery Admit only): To be obtained in the Pre-Procedure area  Allergies Reviewed: Yes  Anticoag/NSAID held?: No  Currently on antibiotics?: No  Pregnancy denied?: No    Assessment:   1   Cervical radiculopathy        Plan: Left HUGH

## 2020-07-08 NOTE — TELEPHONE ENCOUNTER
Prescription for diclofenac 50 mg b i d  P r n  Sent to pharmacy for the patient to try for pain  She should avoid any other NSAIDs while on this medication  Furthermore, the patient should contact the facility that she had her x-rays completed that considering these were suboptimal images and we are unable to determine whether not there is a fracture

## 2020-07-08 NOTE — TELEPHONE ENCOUNTER
S/w pt, reviewed results  Pt wants to know what she can do for this pain, she said all Dr Valdo Portillo suggested was a donut pillow but she can not even walk up the stairs anymore without pain

## 2020-07-08 NOTE — DISCHARGE INSTR - LAB
Epidural Steroid Injection   WHAT YOU NEED TO KNOW:   An epidural steroid injection (KESHAV) is a procedure to inject steroid medicine into the epidural space  The epidural space is between your spinal cord and vertebrae  Steroids reduce inflammation and fluid buildup in your spine that may be causing pain  You may be given pain medicine along with the steroids  ACTIVITY  · Do not drive or operate machinery today  · No strenuous activity today - bending, lifting, etc   · You may resume normal activites starting tomorrow - start slowly and as tolerated  · You may shower today, but no tub baths or hot tubs  · You may have numbness for several hours from the local anesthetic  Please use caution and common sense, especially with weight-bearing activities  CARE OF THE INJECTION SITE  · If you have soreness or pain, apply ice to the area today (20 minutes on/20 minutes off)  · Starting tomorrow, you may use warm, moist heat or ice if needed  · You may have an increase or change in your discomfort for 36-48 hours after your treatment  · Apply ice and continue with any pain medication you have been prescribed  · Notify the [facility] if you have any of the following: redness, drainage, swelling, headache, stiff neck or fever above 100°F     SPECIAL INSTRUCTIONS  · Our office will contact you in approximately 7 days for a progress report  MEDICATIONS  · Continue to take all routine medications  · Our office may have instructed you to hold some medications  If you have a problem specifically related to your procedure, please call our office at [phone]  Problems not related to your procedure should be directed to your primary care physician

## 2020-07-08 NOTE — TELEPHONE ENCOUNTER
----- Message from Bebeto Jimenez DO sent at 7/8/2020  4:06 PM EDT -----  Please notify the patient the formal read of her x-ray of sacrum and coccyx has come in  The lateral views were limited as explain to the patient while she was in the office today, however no gross abnormalities or fractures noted

## 2020-07-09 ENCOUNTER — HOSPITAL ENCOUNTER (OUTPATIENT)
Dept: RADIOLOGY | Facility: HOSPITAL | Age: 45
Discharge: HOME | End: 2020-07-09
Attending: ANESTHESIOLOGY
Payer: COMMERCIAL

## 2020-07-09 ENCOUNTER — TRANSCRIBE ORDERS (OUTPATIENT)
Dept: RADIOLOGY | Facility: HOSPITAL | Age: 45
End: 2020-07-09

## 2020-07-09 DIAGNOSIS — M53.3 COCCYDYNIA: ICD-10-CM

## 2020-07-09 NOTE — TELEPHONE ENCOUNTER
Attempted to reach patient  Left detailed message that order has been place for repeat x-ray  Patient has office phone number for further questions and concerns

## 2020-07-09 NOTE — TELEPHONE ENCOUNTER
SW patient and made aware that diclofenac has been sent to the pharmacy  Advised patient to avoid any other NSAIDS while taking diclofenac  Patient states that a lot of her pain is located at the top of her buttocks  Patient is asking if she chooses to go to another facility to have the repeat x-rays will she needs another order from you?

## 2020-07-09 NOTE — TELEPHONE ENCOUNTER
I do not think so considering the last x-ray was inadequate, however order has been placed just in case

## 2020-07-15 ENCOUNTER — TELEPHONE (OUTPATIENT)
Dept: PAIN MEDICINE | Facility: CLINIC | Age: 45
End: 2020-07-15

## 2020-07-15 DIAGNOSIS — M54.40 ACUTE BILATERAL LOW BACK PAIN WITH SCIATICA, SCIATICA LATERALITY UNSPECIFIED: Primary | ICD-10-CM

## 2020-07-15 NOTE — TELEPHONE ENCOUNTER
Will see how the patient does over the next week  Follow-up x-ray of the sacrum and coccyx were negative for fracture

## 2020-07-15 NOTE — TELEPHONE ENCOUNTER
Patient said no improvement  However, she says she has fallen prior the procedure, dr Sneha Otero ordered Radha Singer  She didn't get the results for that yet and she feels this could be a contributing factor as to why no improvement  Pt stated Shanae Jansen knows the story because she was with her during the procedure  Please advise   Ph# [phone]  Percentage of relief: 0%  Pain level 6

## 2020-07-16 NOTE — TELEPHONE ENCOUNTER
Patient states that she has numbness in buttocks, pin and needles in her right leg  The actual pain is similar to the pain she feels in her neck area  Pressure with walking up the steps  Patient is asking if there is anything else that can be done now  Patient is using heat to the area

## 2020-07-16 NOTE — TELEPHONE ENCOUNTER
Patient states that she has numbness in buttocks, pin and needles in her right leg  The actual pain is similar to the pain she feels in her neck area  Patient said she has pressure with walking up the steps  Patient is asking if there is anything else that can be done now  Patient is using heat to the area  Patient is asking what type of medication is Voltaren,   advised patient that Voltaren is an anti-inflammatory Advised patient she needs to give the neck area more time at least a full two weeks  Will c/b with any recommendations that [facility] may have  Please advise   TY

## 2020-07-16 NOTE — TELEPHONE ENCOUNTER
X-ray of the lumbar spine has been ordered to evaluate for any abnormalities above sacrum and coccyx which was previously image  A prescription for physical therapy for her low back and lower extremity symptoms has been prescribed  The patient is to complete 6 weeks of physical therapy  If no improvement we will order an MRI of the lumbar spine

## 2020-07-17 NOTE — TELEPHONE ENCOUNTER
Patient returning the nurses  call  Please be advise thank you        Please call patient back @ [phone] after 2:30 pm

## 2020-07-17 NOTE — TELEPHONE ENCOUNTER
--pt to get xray on 7/18--    S/W pt  Advised pt of the same  She stated she will gt the xray done tomorrow and PT already left her a message and she has to C/B  Pt verbalized understanding

## 2020-07-22 ENCOUNTER — DOCUMENTATION (OUTPATIENT)
Dept: NEUROLOGY | Facility: CLINIC | Age: 45
End: 2020-07-22

## 2020-07-22 NOTE — TELEPHONE ENCOUNTER
LMOM for pt to C/B, C/B # provided      --when pt calls back, need to ask when is she getting her lumbar xray done--

## 2020-07-22 NOTE — PROGRESS NOTES
Type Date User Summary Attachment   General 07/22/2020 11:52 AM Emilia Other care coordination  -   Note    Botox- authorization #: [id number]- 2nd visit- valid from 5/20/2020 until 5/20/2021   Please use our stock      Thank you,     Trell Victoria

## 2020-07-22 NOTE — PROGRESS NOTES
Patient is scheduled with Aleida Contreras on 9/3/2020 in the [facility]

## 2020-07-28 ENCOUNTER — OFFICE VISIT (OUTPATIENT)
Dept: PAIN MEDICINE | Facility: CLINIC | Age: 45
End: 2020-07-28
Payer: COMMERCIAL

## 2020-07-28 ENCOUNTER — HOSPITAL ENCOUNTER (OUTPATIENT)
Dept: RADIOLOGY | Facility: HOSPITAL | Age: 45
Discharge: HOME | End: 2020-07-28
Attending: ANESTHESIOLOGY
Payer: COMMERCIAL

## 2020-07-28 ENCOUNTER — TRANSCRIBE ORDERS (OUTPATIENT)
Dept: RADIOLOGY | Facility: HOSPITAL | Age: 45
End: 2020-07-28

## 2020-07-28 VITALS
SYSTOLIC BLOOD PRESSURE: 128 MMHG | WEIGHT: 183 LBS | HEART RATE: 82 BPM | DIASTOLIC BLOOD PRESSURE: 84 MMHG | TEMPERATURE: 98.5 F | BODY MASS INDEX: 30.49 KG/M2 | HEIGHT: 65 IN

## 2020-07-28 DIAGNOSIS — M54.12 CERVICAL RADICULOPATHY: ICD-10-CM

## 2020-07-28 DIAGNOSIS — M46.1 SACROILIITIS (HCC): ICD-10-CM

## 2020-07-28 DIAGNOSIS — M53.3 COCCYDYNIA: ICD-10-CM

## 2020-07-28 DIAGNOSIS — G89.4 CHRONIC PAIN SYNDROME: Primary | ICD-10-CM

## 2020-07-28 DIAGNOSIS — M54.50 ACUTE BILATERAL LOW BACK PAIN WITHOUT SCIATICA: ICD-10-CM

## 2020-07-28 DIAGNOSIS — M54.40 ACUTE BILATERAL LOW BACK PAIN WITH SCIATICA, SCIATICA LATERALITY UNSPECIFIED: ICD-10-CM

## 2020-07-28 DIAGNOSIS — M79.18 MYOFASCIAL PAIN SYNDROME: ICD-10-CM

## 2020-07-28 PROCEDURE — 99214 OFFICE O/P EST MOD 30 MIN: CPT | Performed by: NURSE PRACTITIONER

## 2020-07-28 PROCEDURE — 3008F BODY MASS INDEX DOCD: CPT | Performed by: NURSE PRACTITIONER

## 2020-07-28 PROCEDURE — 1036F TOBACCO NON-USER: CPT | Performed by: NURSE PRACTITIONER

## 2020-07-28 PROCEDURE — 72100 X-RAY EXAM L-S SPINE 2/3 VWS: CPT

## 2020-07-28 RX ORDER — DULOXETIN HYDROCHLORIDE 60 MG/1
60 CAPSULE, DELAYED RELEASE ORAL DAILY
Qty: 30 CAPSULE | Refills: 1 | Status: SHIPPED | OUTPATIENT
Start: 2020-07-28 | End: 2021-04-26

## 2020-07-28 RX ORDER — DULOXETIN HYDROCHLORIDE 30 MG/1
30 CAPSULE, DELAYED RELEASE ORAL DAILY
Qty: 30 CAPSULE | Refills: 1 | Status: SHIPPED | OUTPATIENT
Start: 2020-07-28 | End: 2021-04-26

## 2020-07-28 NOTE — PROGRESS NOTES
Assessment:  1  Chronic pain syndrome    2  Cervical radiculopathy    3  Coccydynia    4  Sacroiliitis (HCC) - Right    5  Myofascial pain syndrome    6  Acute bilateral low back pain without sciatica        Plan:  1  I will order an updated MRI of the cervical spine as the patient has now had cervical epidural steroid injection x4 and they are starting to lose their efficacy  Her pain has now returned to baseline  Based on results may consider surgical evaluation  2  The patient will initiate in physical therapy for her low back as ordered  If no relief after 6 weeks, will order MRI of the lumbar spine  3  I will increase duloxetine 90 mg daily for ongoing neuropathic complaints  4  The patient may continue diclofenac 50 mg twice a day and should avoid other NSAIDs while on this medication  5  Patient may benefit from right SI joint injection in the future  6  The patient will continue to follow with Neurology as scheduled  7  The patient will follow-up in 6 weeks for medication prescription refill and reevaluation  The patient was advised to contact the office should their symptoms worsen in the interim  The patient was agreeable and verbalized an understanding  M*[employer] software was used to dictate this note  It may contain errors with dictating incorrect words or incorrect spelling  Please contact the provider directly with any questions  History of Present Illness: The patient is a 41 y o  female a history of migraines last seen on 6/4/20 who presents for a follow up office visit in regards to chronic neck pain that causes headaches and radiates into the left upper extremity secondary to cervical radiculopathy  The patient denies bowel or bladder incontinence or balance issues  The patient is status post cervical epidural steroid injection with Dr Jordana Ruano on July 2020 without any improvement of her neck and left arm pain    MRI of the cervical spine does not reveal any compressive pathology, however EMG of the upper extremities reveals a chronic left C5-6 radiculopathy  She does see neurology for Botox injections which has improved her migraines, however she states she recently fell out of a hammock and landed on her tail bone and since then, she has been experiencing pain in her low back with associated occasional numbness and tingling in her lower extremities as well as increased neck pain and migraines  X-ray of the sacrum and coccyx was negative for fracture  X-ray of the lumbar spine was just performed today, therefore has not yet been read  She did start taking diclofenac 50 mg twice a day which has improved her low back pain  The patient currently rates her pain a 7/10 on the numeric pain rating scale  She states her pain is constant nature and follows no particular pattern throughout the day  She characterizes the pain as sharp, throbbing, pressure-like, shooting, numbness and pins and needles  I have personally reviewed and/or updated the patient's past medical history, past surgical history, family history, social history, current medications, allergies, and vital signs today  Review of Systems:    Review of Systems   Respiratory: Negative for shortness of breath  Cardiovascular: Negative for chest pain  Gastrointestinal: Negative for constipation, diarrhea, nausea and vomiting  Musculoskeletal: Negative for arthralgias, gait problem, joint swelling and myalgias  Skin: Negative for rash  Neurological: Negative for dizziness, seizures and weakness  All other systems reviewed and are negative          Past Medical History:   Diagnosis Date    Anxiety     Depression     Migraine        Past Surgical History:   Procedure Laterality Date    TUBAL LIGATION         Family History   Problem Relation Age of Onset    Hypertension Mother     Hypertension Father     No Known Problems Sister     COPD Maternal Grandmother     No Known Problems Son     No Known Problems Daughter  No Known Problems Sister     Lung cancer Paternal Grandmother     Heart disease Paternal Grandfather     Stroke Paternal Grandfather          after being bedridden from Stroke few years after initial stroke in     No Known Problems Maternal Aunt     Mental illness Maternal Aunt     Mental illness Maternal Aunt     No Known Problems Maternal Uncle     Lung cancer Paternal Aunt     No Known Problems Paternal Aunt     No Known Problems Paternal Aunt     Mental illness Paternal Aunt     Stroke Paternal Uncle     Stroke Paternal Uncle          after being bedridden for approx 2 years       Social History     Occupational History    Occupation:    Tobacco Use    Smoking status: Never Smoker    Smokeless tobacco: Never Used   Substance and Sexual Activity    Alcohol use:  Yes     Alcohol/week: 2 0 standard drinks     Types: 1 Glasses of wine, 1 Standard drinks or equivalent per week     Frequency: 2-4 times a month     Comment: Drink socially varying 1-2 drink per 2 wks, wine or mixed dr Raymundo Youssef Drug use: No    Sexual activity: Yes     Partners: Male     Birth control/protection: Female Sterilization     Comment: Tubal ligation in          Current Outpatient Medications:     albuterol (PROVENTIL HFA,VENTOLIN HFA) 90 mcg/act inhaler, Inhale 2 puffs every 6 (six) hours as needed for wheezing, Disp: 1 Inhaler, Rfl: 0    benzonatate (TESSALON PERLES) 100 mg capsule, Take 1 capsule (100 mg total) by mouth 3 (three) times a day as needed for cough, Disp: 15 capsule, Rfl: 0    Botulinum Toxin Type A 200 units SOLR, Inject up to 200 units IM by physician into the head and neck muscles every three months, Disp: 200 Units, Rfl: 4    cetirizine (ZyrTEC) 10 mg tablet, Take 1 tablet (10 mg total) by mouth daily, Disp: 30 tablet, Rfl: 0    diclofenac sodium (VOLTAREN) 50 mg EC tablet, Take 1 tablet (50 mg total) by mouth 2 (two) times a day as needed (Moderate pain), Disp: 60 tablet, Rfl: 1    DULoxetine (CYMBALTA) 60 mg delayed release capsule, Take 1 capsule (60 mg total) by mouth daily, Disp: 30 capsule, Rfl: 1    fluticasone (FLONASE) 50 mcg/act nasal spray, 2 sprays into each nostril daily, Disp: 16 g, Rfl: 0    prochlorperazine (COMPAZINE) 10 mg tablet, 1 tab at onset of migraine, can repeat in 8 hours, can take with triptan/NSAID, Disp: 10 tablet, Rfl: 3    rizatriptan (MAXALT) 10 MG tablet, 1 at onset of a migraine headache May repeat every 2 hours if needed, max 20mg/24 hours, Disp: 12 tablet, Rfl: 3    tiZANidine (ZANAFLEX) 2 mg tablet, Take 1 tablet (2 mg total) by mouth every 8 (eight) hours as needed for muscle spasms, Disp: 90 tablet, Rfl: 0    DULoxetine (CYMBALTA) 30 mg delayed release capsule, Take 1 capsule (30 mg total) by mouth daily In addition to the 60mg capsule, Disp: 30 capsule, Rfl: 1    No Known Allergies    Physical Exam:    /84   Pulse 82   Temp 98 5 °F (36 9 °C)   Ht 5' 5" (1 651 m)   Wt 83 kg (183 lb)   BMI 30 45 kg/m²     Constitutional:normal, well developed, well nourished, alert, in no distress and non-toxic and no overt pain behavior  Eyes:anicteric  HEENT:grossly intact  Neck:supple, symmetric, trachea midline and no masses   Pulmonary:even and unlabored  Cardiovascular:No edema or pitting edema present  Skin:Normal without rashes or lesions and well hydrated  Psychiatric:Mood and affect appropriate  Neurologic:Cranial Nerves II-XII grossly intact  Musculoskeletal:normal gait  Left SI joint nontender to palpation, right SI joint tender to palpation  Bilateral lumbar paraspinal musculature mildly tender to palpation  Negative straight leg raise bilaterally  Positive Echo finger and Benitez's test on the right, negative on the left  Negative Gaenslen's test bilaterally        Imaging  MRI cervical spine wo contrast    (Results Pending)     SACRUM AND COCCYX     INDICATION:   M53 3: Sacrococcygeal disorders, not elsewhere classified      COMPARISON:  None     VIEWS:  XR SACRUM AND COCCYX   Images: 4     FINDINGS:     Limited lateral views  Sacrum cannot be adequately evaluated      There is no evidence of an acute fracture      Sacral arcuate lines are maintained      The sacroiliac joints appear symmetric      Pubic symphysis is maintained      Visualized lower lumbar spine is unremarkable      IMPRESSION:     AP views demonstrate no acute osseous abnormality    Unfortunately limited lateral images with no gross abnormality    Orders Placed This Encounter   Procedures    MRI cervical spine wo contrast

## 2020-08-06 ENCOUNTER — TELEPHONE (OUTPATIENT)
Dept: PAIN MEDICINE | Facility: CLINIC | Age: 45
End: 2020-08-06

## 2020-08-06 ENCOUNTER — HOSPITAL ENCOUNTER (OUTPATIENT)
Dept: RADIOLOGY | Facility: HOSPITAL | Age: 45
Discharge: HOME | End: 2020-08-06
Payer: COMMERCIAL

## 2020-08-06 DIAGNOSIS — M54.12 CERVICAL RADICULOPATHY: ICD-10-CM

## 2020-08-06 PROCEDURE — 72141 MRI NECK SPINE W/O DYE: CPT

## 2020-08-06 NOTE — TELEPHONE ENCOUNTER
MEY patient and reviewed results of MRI  Patient appreciative  Patient is concerned as to where she goes now>  Patient states she is in pain and needs to know what to do next to help get it resolved  Please advise   TY

## 2020-08-06 NOTE — TELEPHONE ENCOUNTER
RN s/w pt   Pt states " It's my neck and shoulder pain which is where the initial pain started "    Please advise-

## 2020-09-03 ENCOUNTER — PROCEDURE VISIT (OUTPATIENT)
Dept: NEUROLOGY | Facility: CLINIC | Age: 45
End: 2020-09-03
Payer: COMMERCIAL

## 2020-09-03 VITALS — TEMPERATURE: 98.4 F | HEART RATE: 85 BPM | SYSTOLIC BLOOD PRESSURE: 112 MMHG | DIASTOLIC BLOOD PRESSURE: 78 MMHG

## 2020-09-03 DIAGNOSIS — G43.709 CHRONIC MIGRAINE WITHOUT AURA WITHOUT STATUS MIGRAINOSUS, NOT INTRACTABLE: Primary | ICD-10-CM

## 2020-09-03 PROCEDURE — 64615 CHEMODENERV MUSC MIGRAINE: CPT | Performed by: PHYSICIAN ASSISTANT

## 2020-09-03 NOTE — PROGRESS NOTES
Chemodenervation    Date/Time: 9/3/2020 3:34 PM  Performed by: Miranda Darden PA-C  Authorized by: Miranda Darden PA-C     Pre-procedure details:     Prepped With: Alcohol    Anesthesia (see MAR for exact dosages): Anesthesia method:  None  Procedure details:     Position:  Upright  Botox:     Botox Type:  Type A    Brand:  Botox    mL's of Botulinum Toxin:  200    Final Concentration per CC:  50 units    Needle Gauge:  30 G 2 5 inch  Procedures:     Botox Procedures: chronic headache      Indications: migraines    Injection Location:     Head / Face:  L superior cervical paraspinal, R superior cervical paraspinal, L , R , L frontalis, R frontalis, L medial occipitalis, R medial occipitalis, procerus, R temporalis, L temporalis, R superior trapezius and L superior trapezius    L  injection amount:  5 unit(s)    R  injection amount:  5 unit(s)    L lateral frontalis:  5 unit(s)    R lateral frontalis:  5 unit(s)    L medial frontalis:  5 unit(s)    R medial frontalis:  5 unit(s)    L temporalis injection amount:  20 unit(s)    R temporalis injection amount:  20 unit(s)    Procerus injection amount:  5 unit(s)    L medial occipitalis injection amount:  15 unit(s)    R medial occipitalis injection amount:  15 unit(s)    L superior cervical paraspinal injection amount:  10 unit(s)    R superior cervical paraspinal injection amount:  10 unit(s)    L superior trapezius injection amount:  15 unit(s)    R superior trapezius injection amount:  15 unit(s)  Total Units:     Total units used:  200    Total units discarded:  0  Post-procedure details:     Chemodenervation:  Chronic migraine    Facial Nerve Location[de-identified]  Bilateral facial nerve    Patient tolerance of procedure:   Tolerated well, no immediate complications  Comments:      5 units orbicularis oculi bilaterally  10 units trapezius  10 units semisplenalis capitis  15 units frontalis  All medically necessary

## 2020-10-23 ENCOUNTER — DOCUMENTATION (OUTPATIENT)
Dept: NEUROLOGY | Facility: CLINIC | Age: 45
End: 2020-10-23

## 2020-11-23 ENCOUNTER — TELEPHONE (OUTPATIENT)
Dept: NEUROLOGY | Facility: CLINIC | Age: 45
End: 2020-11-23

## 2020-12-03 ENCOUNTER — TELEPHONE (OUTPATIENT)
Dept: NEUROLOGY | Facility: CLINIC | Age: 45
End: 2020-12-03

## 2020-12-07 ENCOUNTER — PROCEDURE VISIT (OUTPATIENT)
Dept: NEUROLOGY | Facility: CLINIC | Age: 45
End: 2020-12-07
Payer: COMMERCIAL

## 2020-12-07 VITALS — HEART RATE: 89 BPM | SYSTOLIC BLOOD PRESSURE: 111 MMHG | TEMPERATURE: 98.4 F | DIASTOLIC BLOOD PRESSURE: 66 MMHG

## 2020-12-07 DIAGNOSIS — G43.709 CHRONIC MIGRAINE WITHOUT AURA WITHOUT STATUS MIGRAINOSUS, NOT INTRACTABLE: Primary | ICD-10-CM

## 2020-12-07 PROCEDURE — 64615 CHEMODENERV MUSC MIGRAINE: CPT | Performed by: PHYSICIAN ASSISTANT

## 2021-01-15 ENCOUNTER — DOCUMENTATION (OUTPATIENT)
Dept: NEUROLOGY | Facility: CLINIC | Age: 46
End: 2021-01-15

## 2021-01-15 NOTE — PROGRESS NOTES
Type Date User Summary Attachment   General 01/15/2021  4:39 PM Dayana Cordova care coordination  -   Note    Botox- authorization #: [id number]- last visit- valid from 5/20/2020 until 5/20/2021   Please use our stock      Thank you     Eryn Johnston

## 2021-02-22 ENCOUNTER — TELEPHONE (OUTPATIENT)
Dept: NEUROLOGY | Facility: CLINIC | Age: 46
End: 2021-02-22

## 2021-02-22 NOTE — TELEPHONE ENCOUNTER
Called pt to confirm upcoming apt in 2 weeks on 3/8/21 with Shayna REYES on patients cell to call office   if any new/worsening symptoms to report? Asked pt if they are unable to keep apt or interested in virtual apt to please call office, also advised of no show fee  Advised we will call closer to day of apt for COVID screening

## 2021-03-04 ENCOUNTER — TELEPHONE (OUTPATIENT)
Dept: NEUROLOGY | Facility: CLINIC | Age: 46
End: 2021-03-04

## 2021-03-08 ENCOUNTER — PROCEDURE VISIT (OUTPATIENT)
Dept: NEUROLOGY | Facility: CLINIC | Age: 46
End: 2021-03-08
Payer: COMMERCIAL

## 2021-03-08 VITALS — DIASTOLIC BLOOD PRESSURE: 72 MMHG | HEART RATE: 82 BPM | TEMPERATURE: 98 F | SYSTOLIC BLOOD PRESSURE: 124 MMHG

## 2021-03-08 DIAGNOSIS — G43.709 CHRONIC MIGRAINE WITHOUT AURA WITHOUT STATUS MIGRAINOSUS, NOT INTRACTABLE: Primary | ICD-10-CM

## 2021-03-08 PROCEDURE — 64615 CHEMODENERV MUSC MIGRAINE: CPT

## 2021-03-17 ENCOUNTER — TELEPHONE (OUTPATIENT)
Dept: NEUROLOGY | Facility: CLINIC | Age: 46
End: 2021-03-17

## 2021-03-17 DIAGNOSIS — G43.709 CHRONIC MIGRAINE WITHOUT AURA WITHOUT STATUS MIGRAINOSUS, NOT INTRACTABLE: Primary | ICD-10-CM

## 2021-03-17 RX ORDER — OLANZAPINE 5 MG/1
5 TABLET ORAL
Qty: 5 TABLET | Refills: 0 | Status: SHIPPED | OUTPATIENT
Start: 2021-03-17

## 2021-03-17 RX ORDER — DEXAMETHASONE 2 MG/1
2 TABLET ORAL
Qty: 5 TABLET | Refills: 0 | Status: SHIPPED | OUTPATIENT
Start: 2021-03-17 | End: 2021-04-26 | Stop reason: SDUPTHER

## 2021-03-17 RX ORDER — RIMEGEPANT SULFATE 75 MG/75MG
75 TABLET, ORALLY DISINTEGRATING ORAL AS NEEDED
Qty: 8 TABLET | Refills: 3 | Status: SHIPPED | OUTPATIENT
Start: 2021-03-17 | End: 2021-04-26 | Stop reason: SDUPTHER

## 2021-03-17 NOTE — TELEPHONE ENCOUNTER
pt called and states that since botox on 3/8 she has had a headache every day,  worse over the last 3-5 days  pain is browline on r eye and behind her right eye and can feel it in her neck  8/10, +light/sound sensitivity      rizatriptan is ineffective this time  Compazine ineffective   No longer taking cymbalta-this was prescribed by PM  No daily preventative medications only botox  She does not think that decadron was effective in the past  Per chart, pt tried depakote as preventative  Has never tired olanzapine  Please advise  [phone] to leave detailed message

## 2021-03-17 NOTE — TELEPHONE ENCOUNTER
I sent in the following  Nurtec 75 mg at onset    Limit of 1 in 24 hours  Download coupon  Decadron 2 mg for 5 days  Olanzapine 5 mg for 5 nights  If this doesn't help can come in for injections too

## 2021-03-18 ENCOUNTER — TELEPHONE (OUTPATIENT)
Dept: NEUROLOGY | Facility: CLINIC | Age: 46
End: 2021-03-18

## 2021-03-18 ENCOUNTER — IMMUNIZATIONS (OUTPATIENT)
Dept: FAMILY MEDICINE CLINIC | Facility: HOSPITAL | Age: 46
End: 2021-03-18

## 2021-03-18 DIAGNOSIS — Z23 ENCOUNTER FOR IMMUNIZATION: Primary | ICD-10-CM

## 2021-03-18 PROCEDURE — 0001A SARS-COV-2 / COVID-19 MRNA VACCINE (PFIZER-BIONTECH) 30 MCG: CPT

## 2021-03-18 PROCEDURE — 91300 SARS-COV-2 / COVID-19 MRNA VACCINE (PFIZER-BIONTECH) 30 MCG: CPT

## 2021-03-19 NOTE — TELEPHONE ENCOUNTER
Received questionnaire form [facility]  Form completed and faxed back and placed at  to be scanned into chart

## 2021-03-22 DIAGNOSIS — G43.709 CHRONIC MIGRAINE WITHOUT AURA WITHOUT STATUS MIGRAINOSUS, NOT INTRACTABLE: ICD-10-CM

## 2021-03-22 DIAGNOSIS — G43.709 CHRONIC MIGRAINE WITHOUT AURA WITHOUT STATUS MIGRAINOSUS, NOT INTRACTABLE: Primary | ICD-10-CM

## 2021-03-22 RX ORDER — SUMATRIPTAN 100 MG/1
TABLET, FILM COATED ORAL
Qty: 9 TABLET | Refills: 0 | Status: SHIPPED | OUTPATIENT
Start: 2021-03-22 | End: 2021-04-26

## 2021-03-22 RX ORDER — SUMATRIPTAN 100 MG/1
100 TABLET, FILM COATED ORAL AS NEEDED
Qty: 9 TABLET | Refills: 0 | Status: SHIPPED | OUTPATIENT
Start: 2021-03-22 | End: 2021-03-22

## 2021-03-22 NOTE — TELEPHONE ENCOUNTER
Pt made aware  She states that she was able to  nurtec with coupon  She took it once and it was effective  She will  sumatriptan and also try that  I made her aware that she can continue to use coupon card for nurtec if she would like to,

## 2021-03-22 NOTE — TELEPHONE ENCOUNTER
Please let patient know I sent in sumatriptan to the pharmacy    If fails, please have her inform us so we can try PA for nurtec again

## 2021-03-22 NOTE — TELEPHONE ENCOUNTER
pharm called regarding sumatriptan script  On   3/16pt picked up rizatriptan and on 3/17 she picked up nurtec-she states that pt did not use coupon card but there was no charge  I made her aware that nurtec was denied so I am not sure how there was no charge  She is asking if you still want pt to get sumatriptan sicne she  the other 2 meds recently    Please advise    [phone]

## 2021-03-23 RX ORDER — SUMATRIPTAN 100 MG/1
TABLET, FILM COATED ORAL
Qty: 9 TABLET | Refills: 0 | OUTPATIENT
Start: 2021-03-23

## 2021-03-23 NOTE — TELEPHONE ENCOUNTER
Left message for pharm making them aware of below        Left detailed message for pt per communication consent regarding below    Can you cancel below pharm request

## 2021-03-23 NOTE — TELEPHONE ENCOUNTER
Left message for pharm making them aware of below        Left detailed message for pt per communication consent regarding below

## 2021-04-09 ENCOUNTER — IMMUNIZATIONS (OUTPATIENT)
Dept: FAMILY MEDICINE CLINIC | Facility: HOSPITAL | Age: 46
End: 2021-04-09

## 2021-04-09 DIAGNOSIS — Z23 ENCOUNTER FOR IMMUNIZATION: Primary | ICD-10-CM

## 2021-04-09 PROCEDURE — 0002A SARS-COV-2 / COVID-19 MRNA VACCINE (PFIZER-BIONTECH) 30 MCG: CPT

## 2021-04-09 PROCEDURE — 91300 SARS-COV-2 / COVID-19 MRNA VACCINE (PFIZER-BIONTECH) 30 MCG: CPT

## 2021-04-26 ENCOUNTER — OFFICE VISIT (OUTPATIENT)
Dept: NEUROLOGY | Facility: CLINIC | Age: 46
End: 2021-04-26
Payer: COMMERCIAL

## 2021-04-26 VITALS
BODY MASS INDEX: 33.2 KG/M2 | HEART RATE: 78 BPM | SYSTOLIC BLOOD PRESSURE: 120 MMHG | HEIGHT: 65 IN | DIASTOLIC BLOOD PRESSURE: 79 MMHG | TEMPERATURE: 98.9 F | WEIGHT: 199.3 LBS

## 2021-04-26 DIAGNOSIS — M79.18 MYOFASCIAL PAIN SYNDROME: Primary | ICD-10-CM

## 2021-04-26 DIAGNOSIS — G43.709 CHRONIC MIGRAINE WITHOUT AURA WITHOUT STATUS MIGRAINOSUS, NOT INTRACTABLE: ICD-10-CM

## 2021-04-26 PROCEDURE — 99215 OFFICE O/P EST HI 40 MIN: CPT | Performed by: PHYSICIAN ASSISTANT

## 2021-04-26 RX ORDER — RIMEGEPANT SULFATE 75 MG/75MG
75 TABLET, ORALLY DISINTEGRATING ORAL AS NEEDED
Qty: 8 TABLET | Refills: 3 | Status: SHIPPED | OUTPATIENT
Start: 2021-04-26

## 2021-04-26 RX ORDER — DEXAMETHASONE 2 MG/1
2 TABLET ORAL
Qty: 5 TABLET | Refills: 0 | Status: SHIPPED | OUTPATIENT
Start: 2021-04-26 | End: 2022-04-20

## 2021-04-26 RX ORDER — DIVALPROEX SODIUM 250 MG/1
TABLET, EXTENDED RELEASE ORAL
Qty: 8 TABLET | Refills: 0 | Status: SHIPPED | OUTPATIENT
Start: 2021-04-26 | End: 2022-04-20

## 2021-04-26 RX ORDER — BACLOFEN 10 MG/1
10 TABLET ORAL
Qty: 30 TABLET | Refills: 4 | Status: SHIPPED | OUTPATIENT
Start: 2021-04-26 | End: 2021-07-26

## 2021-04-26 NOTE — PROGRESS NOTES
[facility]  PATIENT:  Magdalena Roth  MRN:  [id number]  :  1975  DATE OF SERVICE:  2021      Assessment/Plan:     Chronic migraine without aura without status migrainosus, not intractable  Preventive therapy for headaches:   Continue Botox every 3 months  Start Baclofen 10 mg at bedtime  Abortive therapy for headaches: At the onset of a severe headache take Nurtec 75 mg  Limit of 1 in 24 hours  If fails may use Rizatriptan 10 mg and Prochlorperazine 10 mg  If headache persists she may take her rizatriptan again in 2 hours but with Benadryl 50 mg  Decadron 2 mg in am for 5 days  Depakote 250 mg 2 tabs for 3 nights and 1 tab for 2 nights           Problem List Items Addressed This Visit        Cardiovascular and Mediastinum    Chronic migraine without aura without status migrainosus, not intractable     Preventive therapy for headaches:   Continue Botox every 3 months  Start Baclofen 10 mg at bedtime  Abortive therapy for headaches: At the onset of a severe headache take Nurtec 75 mg  Limit of 1 in 24 hours  If fails may use Rizatriptan 10 mg and Prochlorperazine 10 mg  If headache persists she may take her rizatriptan again in 2 hours but with Benadryl 50 mg  Decadron 2 mg in am for 5 days  Depakote 250 mg 2 tabs for 3 nights and 1 tab for 2 nights           Relevant Medications    dexamethasone (DECADRON) 2 mg tablet    divalproex sodium (DEPAKOTE ER) 250 mg 24 hr tablet    Rimegepant Sulfate (Nurtec) 75 MG TBDP    baclofen 10 mg tablet       Musculoskeletal and Integument    Myofascial pain syndrome - Primary    Relevant Medications    baclofen 10 mg tablet              History of Present Illness: We had the pleasure of evaluating Magdalena Roth in neurological follow up  today for headaches  As you know,  she is a 40 y o   right handed female  She works at [employer] in [address] [place] [address] control  Complains of left scapular area pain, constant dull ache and throbbing  Think it worsened at least 4-6 weeks ago  Feels like this is making her migraines worse      Chronic migraine headaches:   What medications do you take or have you taken for your headaches?    PREVENTATIVE:  - melatonin, multivitamin, B12, biotin, vitamin-D  - Depakote, gabapentin  - amitriptyline (gain weight on it) venlafaxine, cymbalta  - Robaxin, tizanidine  - Botox  ABORTIVE:  - methylprednisolone,  dexamethasone  - ketorolac, diclofenac  - Fioricet (stopped in 2015)  - Olanzapine  - Depakote  - prochlorperazine  - Excedrin, Aleve, ibuprofen  - DHE  - Sumatriptan, Rizatriptan  - Nurtec  Headache are worse if the patient: cough, sneeze, bending over  Headache triggers:  Not sure     Aura/Warning and how long does it last - blurry vision - within half an hour     What is your current pain level - 0/10     How often do the headaches occur?   mild headache: for past month has had more, almost daily; prior to Botox was daily  Moderate headaches 1-2 a week; prior to Botox was daily  Severe headache: 5 a month; prior to Botox was daily     What time of the day do the headaches start?   mild pain: varies  Severe headache: varies     How long do the headaches last?   mild headache:  a few hours  Severe headache: 1-2 days      Are you ever headache free? yes     Describe your usual headache -   mild pain: Throbbing  Severe pain: Stabbing     Where is your headache located?   mild headache: anywhere in the head  Severe headache: right frontal and parietal region     What is the intensity of pain?   mild headaches:  4/10  Moderate headaches: 8/10   Severe headaches: 10/10      Associated symptoms:   · Decrease of appetite, nausea, vomiting, diarrhea  · Insomnia  · Photophobia, phonophobia, sensitivity to smell   · Problem with concentration  · Blurred vision,   · Pupil dilated on the right  · Hands or feet tingle or feel numb  ·  flushing of face  · Lacrimation, runny or stuffed-up nose,   · light-headed or dizzy, stiff or sore neck  · prefer to be alone and in a dark room, unable to work     Number of days missed per month because of headaches:  Work (or school) days: missed 1 day but this was right before last Botox  Social or Family activities:  none, was often before Botox     What time of the year do headaches occur more frequently? Not sure  Have you seen someone else for headaches or pain? Yes  Have you had trigger point injection performed and how often? No  Have you had Botox injection performed and how often? Yes  Have you had epidural injections or transforaminal injections performed? Yes     EM2018:   This is an abnormal study  There is electrophysiological evidence consistent with an active subacute to chronic left C5/C6 radiculopathy  There was no electrophysiological evidence of median or ulnar entrapment neuropathy, or brachial plexopathy in the left upper extremity      Sleep Habit  Is your sleep restful? yes  How often do you get up at night? 0-1  Do you wake up with headaches? yes sometimes  Do you snore while asleep? no  Have you been told that you stop breathing during sleeping?    no  Do you wake up tired in the morning? no  Do you take frequent naps during the day? no  Do you have jaw pain? yes  Do you grind/clench your teeth at night? yes  Do you have restless leg syndrome? no  Do you have nightmare or sleep walk? no     Alternative therapies used in the past for headaches? physical therapy  Have you used CBD or THC for your headaches and how often? No  Are you current pregnant or planning on getting pregnant? No  Have you ever had any Brain imaging? yes        I personally reviewed these images      With botox has had a reduction of at least 7 migraine days with less abortive medication, less ER visits which correlates to headache diary    Reviewed old notes from physician seen in the past- see above HPI for summary of previous encounters         Past Medical History:   Diagnosis Date    Anxiety  Depression     Migraine        Patient Active Problem List   Diagnosis    Vitamin D deficiency    Chronic migraine without aura without status migrainosus, not intractable    Left arm weakness    Anxiety and depression    Cervical radiculopathy    Trigeminal autonomic cephalgias    Peripheral neuropathy    Positive cardiolipin antibodies    Motion sickness    BMI 30 0-30 9,adult    Acute cystitis with hematuria    Myofascial pain syndrome       Medications:      Current Outpatient Medications   Medication Sig Dispense Refill    Botulinum Toxin Type A 200 units SOLR Inject up to 200 units IM by physician into the head and neck muscles every three months 200 Units 4    dexamethasone (DECADRON) 2 mg tablet Take 1 tablet (2 mg total) by mouth daily with breakfast 5 tablet 0    diclofenac sodium (VOLTAREN) 50 mg EC tablet Take 1 tablet (50 mg total) by mouth 2 (two) times a day as needed (Moderate pain) 60 tablet 1    prochlorperazine (COMPAZINE) 10 mg tablet 1 tab at onset of migraine, can repeat in 8 hours, can take with triptan/NSAID 10 tablet 3    Rimegepant Sulfate (Nurtec) 75 MG TBDP Take 75 mg by mouth as needed (migraine) 8 tablet 3    rizatriptan (MAXALT) 10 MG tablet 1 at onset of a migraine headache May repeat every 2 hours if needed, max 20mg/24 hours 12 tablet 3    baclofen 10 mg tablet Take 1 tablet (10 mg total) by mouth daily at bedtime 30 tablet 4    divalproex sodium (DEPAKOTE ER) 250 mg 24 hr tablet 2 tablets at bedtime for 3 days then 1 tablet at bedtime for 2 days 8 tablet 0    OLANZapine (ZyPREXA) 5 mg tablet Take 1 tablet (5 mg total) by mouth daily at bedtime (Patient not taking: Reported on 4/26/2021) 5 tablet 0     No current facility-administered medications for this visit           Allergies:    No Known Allergies    Family History:     Family History   Problem Relation Age of Onset    Hypertension Mother     Hypertension Father     No Known Problems Sister    Jose Rodriguez COPD Maternal Grandmother     No Known Problems Son     No Known Problems Daughter     No Known Problems Sister     Lung cancer Paternal Grandmother     Heart disease Paternal Grandfather     Stroke Paternal [de-identified]          after being bedridden from Stroke few years after initial stroke in 200    No Known Problems Maternal Aunt     Mental illness Maternal Aunt     Mental illness Maternal Aunt     No Known Problems Maternal Uncle     Lung cancer Paternal Aunt     No Known Problems Paternal Aunt     No Known Problems Paternal Aunt     Mental illness Paternal Aunt     Stroke Paternal Uncle     Stroke Paternal Uncle          after being bedridden for approx 2 years       Social History:     Social History     Socioeconomic History    Marital status: /Civil Union     Spouse name: Not on file    Number of children: Not on file    Years of education: Not on file    Highest education level: Not on file   Occupational History    Occupation:    Social Needs    Financial resource strain: Not on file    Food insecurity     Worry: Not on file     Inability: Not on file   [employer] needs     Medical: Not on file     Non-medical: Not on file   Tobacco Use    Smoking status: Never Smoker    Smokeless tobacco: Never Used   Substance and Sexual Activity    Alcohol use:  Yes     Alcohol/week: 2 0 standard drinks     Types: 1 Glasses of wine, 1 Standard drinks or equivalent per week     Frequency: 2-4 times a month     Comment: Drink socially varying 1-2 drink per 2 wks, wine or mixed dr Yelitza Echols Drug use: No    Sexual activity: Yes     Partners: Male     Birth control/protection: Female Sterilization     Comment: Tubal ligation in    Lifestyle    Physical activity     Days per week: Not on file     Minutes per session: Not on file    Stress: Not on file   Relationships    Social connections     Talks on phone: Not on file     Gets together: Not on file Attends Mandaeism service: Not on file     Active member of club or organization: Not on file     Attends meetings of clubs or organizations: Not on file     Relationship status: Not on file    Intimate partner violence     Fear of current or ex partner: Not on file     Emotionally abused: Not on file     Physically abused: Not on file     Forced sexual activity: Not on file   Other Topics Concern    Not on file   Social History Narrative    Caffeine use    Pt lives with daughter      I have reviewed the patient's medical, social and surgical history as well as medications in detail and updated the computerized patient record  Objective:   Physical Exam:                                                                   Vitals:            /79 (BP Location: Left arm, Patient Position: Sitting, Cuff Size: Standard)   Pulse 78   Temp 98 9 °F (37 2 °C) (Temporal)   Ht 5' 5" (1 651 m)   Wt 90 4 kg (199 lb 4 8 oz)   BMI 33 17 kg/m²   BP Readings from Last 3 Encounters:   04/26/21 120/79   03/08/21 124/72   12/07/20 111/66     Pulse Readings from Last 3 Encounters:   04/26/21 78   03/08/21 82   12/07/20 89                CONSTITUTIONAL: Well developed, well nourished, well groomed  No dysmorphic features  Eyes:  PERRLA, EOM normal      Neck:  Normal ROM, neck supple  HEENT:  Normocephalic atraumatic  Oropharynx is moist    Chest:  Respirations regular and unlabored  Cardiovascular:  Distal extremities warm  no observed significant swelling  Musculoskeletal:  Full range of motion  (see below under neurologic exam for evaluation of motor function and gait)   Skin:  warm and dry   Psychiatric:  Normal behavior and appropriate affect        SKULL AND SPINE  ROM: Full range of motion  Tenderness: No focal tenderness    MENTAL STATUS  Orientation: Alert and oriented x 3  Fund of knowledge: Intact        MOTOR (Upper and lower extremities)   Bulk/tone/abnormal movement: Normal muscle bulk and tone   Strength: Strength 5/5 throughout  COORDINATION   Station/Gait: Normal baseline gait  Reflexes:  deep tendon reflexes are 2+/4 throughout           Review of Systems:   Review of Systems  Constitutional: Negative  HENT: Negative  Eyes: Negative  Respiratory: Negative  Cardiovascular: Negative  Gastrointestinal: Negative  Endocrine: Negative  Genitourinary: Negative  Musculoskeletal: Negative  Skin: Negative  Allergic/Immunologic: Negative  Neurological: Positive for headaches  Hematological: Negative  Psychiatric/Behavioral: Negative  I personally reviewed the ROS entered by the MA    I spent 30 minutes in face-to-face discussion regarding  the pathophysiology of her current symptoms and further plan, as well as counseling, educating, and coordinating the patient's care including diagnostic results, impression, and recommendations, risks and benefits of treatment, instructions for disease self management, treatment instructions and follow up requirements and spent 15 minutes non-face to face    Author:  Cesar Segal PA-C 4/26/2021 3:51 PM

## 2021-04-26 NOTE — ASSESSMENT & PLAN NOTE
Preventive therapy for headaches:   Continue Botox every 3 months  Start Baclofen 10 mg at bedtime  Abortive therapy for headaches: At the onset of a severe headache take Nurtec 75 mg  Limit of 1 in 24 hours  If fails may use Rizatriptan 10 mg and Prochlorperazine 10 mg  If headache persists she may take her rizatriptan again in 2 hours but with Benadryl 50 mg      Decadron 2 mg in am for 5 days  Depakote 250 mg 2 tabs for 3 nights and 1 tab for 2 nights

## 2021-04-26 NOTE — PATIENT INSTRUCTIONS
Preventive therapy for headaches:   Continue Botox every 3 months  Start Baclofen 10 mg at bedtime  Abortive therapy for headaches: At the onset of a severe headache take Nurtec 75 mg  Limit of 1 in 24 hours  If fails may use Rizatriptan 10 mg and Prochlorperazine 10 mg  If headache persists she may take her rizatriptan again in 2 hours but with Benadryl 50 mg  Decadron 2 mg in am for 5 days  Depakote 250 mg 2 tabs for 3 nights and 1 tab for 2 nights      Neck pain:   - We discussed the role of neck pathology and poor posture, with straightening of the normal cervical lordosis, in headaches  We discussed how tightening of the neck muscles can irritate the nerves in the occipital region of her head and cause or worsen head pain  We also discussed and demonstrated neck strengthening and relaxation exercises, as well as giving written instructions on these exercises  - We talked about the importance of good posture for improving shoulder, neck, and head pain  The patient was given visualization exercises for correcting posture, which patient will practice at home  If this simple exercise does not help improve the posture, we will consider formal physical therapy in the future  Medication overuse headaches:   - We discussed medication overuse headache [facility]) and how to avoid it in the future  It was explained that all analgesics have the potential to cause medication overuse headache [facility]) and analgesic overuse can negate the effectiveness of headache preventive measures  After successful [address] treatment, preventive medications for an underlying primary headache disorder have a greater chance for success  Avoid medications with narcotics, barbiturates, or caffeine in them as these can cause rebound headaches after very few doses and can interfere with other headache medicine efficacy  Taking any analgesics for more than 2-3 days a week can cause medication overuse headache       Reproductive age women: Should take folic acid daily when taking anti-seizure drugs especially Depakote  [place] Prescription Drug Monitoring Program report was reviewed and was appropriate      Headache management instructions  - When patient has a moderate to severe headache, they should seek rest, initiate relaxation and apply cold compresses to the head  - Maintain regular sleep schedule  Adults need at least 7-8 hours of uninterrupted a night  - Limit over the counter medications such as Tylenol, Ibuprofen, Aleve, Excedrin  (No more than 3 times a week)  - Maintain headache diary  We discussed an NOEL for a smart phone is "Migraine juan ramon"  - Limit caffeine to 1-2 cups 8 to 16 oz a day or less  - Avoid dietary trigger  (aged cheese, peanuts, MSG, aspartame and nitrates)  - Patient is to have regular frequent meals to prevent headache onset  - Please drink at least 64 ounces of water a day to help remain hydrated  Please call with any questions or concerns   Office number is [phone]

## 2021-04-27 ENCOUNTER — TELEPHONE (OUTPATIENT)
Dept: NEUROLOGY | Facility: CLINIC | Age: 46
End: 2021-04-27

## 2021-04-27 NOTE — TELEPHONE ENCOUNTER
Botox authorization faxed to [address] [place] [address] on 4/27/21  Authorization can take up to 15 days to received a determination  Will await an approval/denial letter

## 2021-05-05 NOTE — TELEPHONE ENCOUNTER
Received an approval for the patient's Botox authorization from [address] [place] [address]  Approval letter has been scanned into the patient's chart under "media" for future reference      Authorization information:    Botox 200 units approved  Authorization #: [id number]  Valid dates: 6/1/2021 until 6/1/2022- valid for 800 units (4 visits)  Please use our stock

## 2021-05-06 ENCOUNTER — DOCUMENTATION (OUTPATIENT)
Dept: NEUROLOGY | Facility: CLINIC | Age: 46
End: 2021-05-06

## 2021-05-06 NOTE — PROGRESS NOTES
Patient is scheduled with Dominique Warner on 6/17/201 in the [facility] location

## 2021-05-06 NOTE — PROGRESS NOTES
Type Date User Summary Attachment   General 05/05/2021  3:06 PM Emily Pereira care coordination  -   Note    Botox- authorization #: [id number]- valid for 4 visits (800 units total)- from 6/1/2021 until 6/1/2022   Please use our stock      Thank you,     Fatou Quispe

## 2021-06-10 ENCOUNTER — TELEPHONE (OUTPATIENT)
Dept: NEUROLOGY | Facility: CLINIC | Age: 46
End: 2021-06-10

## 2021-06-10 NOTE — TELEPHONE ENCOUNTER
received fax requesting PA for [facility]  PA already completed in may and approved      left message for pharm again that PA completed and approved

## 2021-06-17 ENCOUNTER — PROCEDURE VISIT (OUTPATIENT)
Dept: NEUROLOGY | Facility: CLINIC | Age: 46
End: 2021-06-17
Payer: COMMERCIAL

## 2021-06-17 VITALS — SYSTOLIC BLOOD PRESSURE: 129 MMHG | TEMPERATURE: 97.9 F | DIASTOLIC BLOOD PRESSURE: 83 MMHG | HEART RATE: 78 BPM

## 2021-06-17 DIAGNOSIS — G43.709 CHRONIC MIGRAINE WITHOUT AURA WITHOUT STATUS MIGRAINOSUS, NOT INTRACTABLE: Primary | ICD-10-CM

## 2021-06-17 DIAGNOSIS — G43.009 MIGRAINE WITHOUT AURA AND WITHOUT STATUS MIGRAINOSUS, NOT INTRACTABLE: ICD-10-CM

## 2021-06-17 PROCEDURE — 64615 CHEMODENERV MUSC MIGRAINE: CPT | Performed by: PHYSICIAN ASSISTANT

## 2021-06-17 RX ORDER — RIMEGEPANT SULFATE 75 MG/75MG
75 TABLET, ORALLY DISINTEGRATING ORAL EVERY OTHER DAY
Qty: 16 TABLET | Refills: 6 | Status: SHIPPED | OUTPATIENT
Start: 2021-06-17 | End: 2021-12-20 | Stop reason: SDUPTHER

## 2021-06-17 NOTE — PROGRESS NOTES
Universal Protocol   Consent: Verbal consent obtained  Written consent obtained  Risks and benefits: risks, benefits and alternatives were discussed  Consent given by: patient  Time out: Immediately prior to procedure a "time out" was called to verify the correct patient, procedure, equipment, support staff and site/side marked as required  Patient understanding: patient states understanding of the procedure being performed  Patient consent: the patient's understanding of the procedure matches consent given  Procedure consent: procedure consent matches procedure scheduled  Relevant documents: relevant documents present and verified  Patient identity confirmed: verbally with patient        Chemodenervation     Date/Time 6/17/2021 3:03 PM     Performed by  Tracy Ray PA-C     Authorized by Tracy Ray PA-C        Pre-procedure details      Prepped With: Alcohol     Anesthesia  (see MAR for exact dosages):      Anesthesia method:  None   Procedure details     Position:  Upright   Botox     Botox Type:  Type A    Brand:  Botox    mL's of Botulinum Toxin:  200    Final Concentration per CC:  50 units    Needle Gauge:  30 G 2 5 inch   Procedures     Botox Procedures: chronic headache      Indications: migraines     Injection Location      Head / Face:  L superior cervical paraspinal, R superior cervical paraspinal, L , R , L frontalis, R frontalis, L medial occipitalis, R medial occipitalis, procerus, R temporalis, L temporalis, R superior trapezius and L superior trapezius    L  injection amount:  5 unit(s)    R  injection amount:  5 unit(s)    L lateral frontalis:  5 unit(s)    R lateral frontalis:  5 unit(s)    L medial frontalis:  5 unit(s)    R medial frontalis:  5 unit(s)    L temporalis injection amount:  20 unit(s)    R temporalis injection amount:  20 unit(s)    Procerus injection amount:  5 unit(s)    L medial occipitalis injection amount:  15 unit(s)    R medial occipitalis injection amount:  15 unit(s)    L superior cervical paraspinal injection amount:  10 unit(s)    R superior cervical paraspinal injection amount:  10 unit(s)    L superior trapezius injection amount:  15 unit(s)    R superior trapezius injection amount:  15 unit(s)   Total Units     Total units used:  200    Total units discarded:  0   Post-procedure details      Chemodenervation:  Chronic migraine    Facial Nerve Location[de-identified]  Bilateral facial nerve    Patient tolerance of procedure:   Tolerated well, no immediate complications   Comments       5 units orbicularis oculi bilaterally  15 units trapezius  5 units semisplenalis capitis bilaterally  10 units frontalis  All medically necessary

## 2021-06-24 ENCOUNTER — TELEPHONE (OUTPATIENT)
Dept: OTHER | Facility: OTHER | Age: 46
End: 2021-06-24

## 2021-06-25 NOTE — TELEPHONE ENCOUNTER
See task from Melissa 10:    received fax requesting PA for [facility]  PA already completed in may and approved      left message for pharm again that PA completed and approved    approved 5/3/2021 - 5/3/2022

## 2021-06-25 NOTE — TELEPHONE ENCOUNTER
patient said  nurtec was originally  approved as abortive (5/3/2021 - 5/3/2022),  but Ned Ford PA-C recently ordered as preventative and script for #16 was  sent to [address] [place] [address] Dr  She said [facility] called to arrange delivery of #16 tab  Verbalized understanding to take one tablet every other day as ordered as preventative for migraines  She asked about side effects, discussed most common is nausea  All questions/concerns addressed at this time

## 2021-06-29 ENCOUNTER — TELEPHONE (OUTPATIENT)
Dept: NEUROLOGY | Facility: CLINIC | Age: 46
End: 2021-06-29

## 2021-06-29 NOTE — TELEPHONE ENCOUNTER
received fax from [facility] in regards to nurtec PA     per chart, pt was recently prescribed preventative dose of nurtec  PA was previously submitted and approved for abortive  Form completed for preventative dose of nurtec and faxed back to [facility]  Placed at  to be scanned into chart

## 2021-06-30 NOTE — TELEPHONE ENCOUNTER
nurtec denied as qty is more than the max allowed for pt's indicaiton, which is 40 tabs every 90 days  Nurtec is only apporved for preventative therapy in pt's with episodic migraine  all previously submitted documentationindicates pt has chronic migraine      Script was already sent to [facility]  please advise

## 2021-06-30 NOTE — TELEPHONE ENCOUNTER
Correct, episodic but get 16 pills for 30 days which is 48 pills for 90 days  Dosing for episodic migraine is every other day    [facility] should fill

## 2021-07-26 DIAGNOSIS — M79.18 MYOFASCIAL PAIN SYNDROME: ICD-10-CM

## 2021-07-26 RX ORDER — BACLOFEN 10 MG/1
TABLET ORAL
Qty: 30 TABLET | Refills: 4 | Status: SHIPPED | OUTPATIENT
Start: 2021-07-26 | End: 2022-04-20

## 2021-08-17 ENCOUNTER — APPOINTMENT (OUTPATIENT)
Dept: RADIOLOGY | Facility: CLINIC | Age: 46
End: 2021-08-17
Payer: COMMERCIAL

## 2021-08-17 ENCOUNTER — OFFICE VISIT (OUTPATIENT)
Dept: URGENT CARE | Facility: CLINIC | Age: 46
End: 2021-08-17
Payer: COMMERCIAL

## 2021-08-17 VITALS
HEART RATE: 96 BPM | RESPIRATION RATE: 16 BRPM | SYSTOLIC BLOOD PRESSURE: 112 MMHG | BODY MASS INDEX: 32.95 KG/M2 | TEMPERATURE: 97.7 F | WEIGHT: 198 LBS | OXYGEN SATURATION: 98 % | DIASTOLIC BLOOD PRESSURE: 69 MMHG

## 2021-08-17 DIAGNOSIS — S33.5XXA LUMBAR BACK SPRAIN, INITIAL ENCOUNTER: Primary | ICD-10-CM

## 2021-08-17 DIAGNOSIS — S33.5XXA LUMBAR BACK SPRAIN, INITIAL ENCOUNTER: ICD-10-CM

## 2021-08-17 DIAGNOSIS — S39.012A LUMBAR STRAIN, INITIAL ENCOUNTER: ICD-10-CM

## 2021-08-17 PROCEDURE — 72100 X-RAY EXAM L-S SPINE 2/3 VWS: CPT

## 2021-08-17 PROCEDURE — G0383 LEV 4 HOSP TYPE B ED VISIT: HCPCS | Performed by: NURSE PRACTITIONER

## 2021-08-17 RX ORDER — METHOCARBAMOL 500 MG/1
500 TABLET, FILM COATED ORAL 3 TIMES DAILY PRN
Qty: 30 TABLET | Refills: 0 | Status: SHIPPED | OUTPATIENT
Start: 2021-08-17 | End: 2022-04-20

## 2021-08-17 RX ORDER — LIDOCAINE 50 MG/G
1 PATCH TOPICAL DAILY
Qty: 15 PATCH | Refills: 0 | Status: SHIPPED | OUTPATIENT
Start: 2021-08-17 | End: 2022-04-20 | Stop reason: ALTCHOICE

## 2021-08-17 RX ORDER — NAPROXEN 500 MG/1
500 TABLET ORAL 2 TIMES DAILY WITH MEALS
Qty: 30 TABLET | Refills: 0 | Status: SHIPPED | OUTPATIENT
Start: 2021-08-17

## 2021-08-17 NOTE — PATIENT INSTRUCTIONS
Your xray was preliminarily read by your provider  A radiologist will read the xray and you will be notified  You are to take tylenol every 4-6 hours as needed  Take the naproxen as prescribed  Use the lidoderm patches as prescribed  Take the robaxin as prescribed - do not drink alcohol or drive machinery while taking  Apply ice  You may need physical therapy   You are to see your PCP   Go to the ED if symptoms worsen  Low Back Strain   WHAT YOU NEED TO KNOW:   Low back strain is an injury to your lower back muscles or tendons  Tendons are strong tissues that connect muscles to bones  The lower back supports most of your body weight and helps you move, twist, and bend  DISCHARGE INSTRUCTIONS:   Return to the emergency department if:   · You hear or feel a pop in your lower back  · You have increased swelling or pain in your lower back  · You have trouble moving your legs  · Your legs are numb  Contact your healthcare provider if:   · You have a fever  · Your pain does not go away, even after treatment  · You have questions or concerns about your condition or care  Medicines: The following medicines may be ordered by your healthcare provider:  · Acetaminophen decreases pain and fever  It is available without a doctor's order  Ask how much to take and how often to take it  Follow directions  Acetaminophen can cause liver damage if not taken correctly  · NSAIDs , such as ibuprofen, help decrease swelling, pain, and fever  This medicine is available with or without a doctor's order  NSAIDs can cause stomach bleeding or kidney problems in certain people  If you take blood thinner medicine, always ask your healthcare provider if NSAIDs are safe for you  Always read the medicine label and follow directions  · Muscle relaxers  help decrease pain and muscle spasms  · Prescription pain medicine  may be given  Ask how to take this medicine safely  · Take your medicine as directed  Contact your healthcare provider if you think your medicine is not helping or if you have side effects  Tell him or her if you are allergic to any medicine  Keep a list of the medicines, vitamins, and herbs you take  Include the amounts, and when and why you take them  Bring the list or the pill bottles to follow-up visits  Carry your medicine list with you in case of an emergency  Self-care:   · Rest  as directed  You may need to rest in bed for a period of time after your injury  Do not lift heavy objects  · Apply ice  on your back for 15 to 20 minutes every hour or as directed  Use an ice pack, or put crushed ice in a plastic bag  Cover it with a towel  Ice helps prevent tissue damage and decreases swelling and pain  · Apply heat  on your lower back for 20 to 30 minutes every 2 hours for as many days as directed  Heat helps decrease pain and muscle spasms  · Slowly start to increase your activity  as the pain decreases, or as directed  Prevent another low back strain:   · Use correct body movements  ? Bend at the hips and knees when you  objects  Do not bend from the waist  Use your leg muscles as you lift the load  Do not use your back  Keep the object close to your chest as you lift it  Try not to twist or lift anything above your waist     ? Change your position often when you stand for long periods of time  Rest one foot on a small box or footrest, and then switch to the other foot often  ? Try not to sit for long periods of time  When you do, sit in a straight-backed chair with your feet flat on the floor  ? Never reach, pull, or push while you are sitting  · Warm up before you exercise  Do exercises that strengthen your back muscles  Ask your healthcare provider about the best exercise plan for you  · Maintain a healthy weight  Ask your healthcare provider how much you should weigh  Ask him to help you create a weight loss plan if you are overweight      © Copyright [employer] 2021 Information is for [employer] use only and may not be sold, redistributed or otherwise used for commercial purposes  All illustrations and images included in CareNotes® are the copyrighted property of [employer]  or [employer]  The above information is an  only  It is not intended as medical advice for individual conditions or treatments  Talk to your doctor, nurse or pharmacist before following any medical regimen to see if it is safe and effective for you

## 2021-08-17 NOTE — LETTER
August 17, 2021     Patient: Himanshu Jones   YOB: 1975   Date of Visit: 8/17/2021       To Whom It May Concern: It is my medical opinion that Himanshu Jones may return to work on 8/19/2021  If you have any questions or concerns, please don't hesitate to call           Sincerely,        YUNIOR Spence    CC: Himanshu Jones

## 2021-08-17 NOTE — PROGRESS NOTES
[facility]        NAME: Evan Church is a 40 y o  female  : 1975    MRN: [id number]  DATE: 2021  TIME: 9:21 AM    Assessment and Plan   Lumbar back sprain, initial encounter [S33  5XXA]  1  Lumbar back sprain, initial encounter  XR spine lumbar 2 or 3 views injury    lidocaine (LIDODERM) 5 %    methocarbamol (ROBAXIN) 500 mg tablet    naproxen (NAPROSYN) 500 mg tablet   2  Lumbar strain, initial encounter  lidocaine (LIDODERM) 5 %    methocarbamol (ROBAXIN) 500 mg tablet    naproxen (NAPROSYN) 500 mg tablet         Patient Instructions       Follow up with PCP in 3-5 days  Proceed to  ER if symptoms worsen  Your xray was preliminarily read by your provider  A radiologist will read the xray and you will be notified  You are to take tylenol every 4-6 hours as needed  Take the naproxen as prescribed  Use the lidoderm patches as prescribed  Take the robaxin as prescribed - do not drink alcohol or drive machinery while taking  Apply ice  You may need physical therapy   You are to see your PCP   Go to the ED if symptoms worsen  Chief Complaint     Chief Complaint   Patient presents with    Back Pain     picking up laudry basket yesterday, felt a pop lower back left side, pain started when bending  Injured back 1 year ago same side  Hasn't taken any pain meds  Unable to sleep, used heating pad with no effectiveness  History of Present Illness       This is a 40year old female who states has a history of back problems and yesterday had bent over to  the laundry basket and felt a pop in her lower back, left side  She states she has not taken anything for pain  She states she was unable to sleep, used heating pad w/o relief  LMP - tubal ligation per patient    Denies n/t down left leg  Denies incontinence or saddle anesthesia     Back Pain        Review of Systems   Review of Systems   Constitutional: Negative  HENT: Negative  Eyes: Negative  Respiratory: Negative  Cardiovascular: Negative  Gastrointestinal: Negative  Endocrine: Negative  Genitourinary: Negative  Musculoskeletal: Positive for back pain  Skin: Negative  Allergic/Immunologic: Negative  Neurological: Negative  Hematological: Negative  Psychiatric/Behavioral: Negative            Current Medications       Current Outpatient Medications:     baclofen 10 mg tablet, TAKE 1 TABLET BY MOUTH DAILY AT BEDTIME, Disp: 30 tablet, Rfl: 4    Botulinum Toxin Type A 200 units SOLR, Inject up to 200 units IM by physician into the head and neck muscles every three months, Disp: 200 Units, Rfl: 4    dexamethasone (DECADRON) 2 mg tablet, Take 1 tablet (2 mg total) by mouth daily with breakfast, Disp: 5 tablet, Rfl: 0    diclofenac sodium (VOLTAREN) 50 mg EC tablet, Take 1 tablet (50 mg total) by mouth 2 (two) times a day as needed (Moderate pain), Disp: 60 tablet, Rfl: 1    divalproex sodium (DEPAKOTE ER) 250 mg 24 hr tablet, 2 tablets at bedtime for 3 days then 1 tablet at bedtime for 2 days, Disp: 8 tablet, Rfl: 0    OLANZapine (ZyPREXA) 5 mg tablet, Take 1 tablet (5 mg total) by mouth daily at bedtime, Disp: 5 tablet, Rfl: 0    prochlorperazine (COMPAZINE) 10 mg tablet, 1 tab at onset of migraine, can repeat in 8 hours, can take with triptan/NSAID, Disp: 10 tablet, Rfl: 3    Rimegepant Sulfate (Nurtec) 75 MG TBDP, Take 75 mg by mouth as needed (migraine), Disp: 8 tablet, Rfl: 3    Rimegepant Sulfate (Nurtec) 75 MG TBDP, Take 75 mg by mouth every other day, Disp: 16 tablet, Rfl: 6    rizatriptan (MAXALT) 10 MG tablet, 1 at onset of a migraine headache May repeat every 2 hours if needed, max 20mg/24 hours, Disp: 12 tablet, Rfl: 3    lidocaine (LIDODERM) 5 %, Apply 1 patch topically daily Remove & Discard patch within 12 hours or as directed by MD, Disp: 15 patch, Rfl: 0    methocarbamol (ROBAXIN) 500 mg tablet, Take 1 tablet (500 mg total) by mouth 3 (three) times a day as needed for muscle spasms, Disp: 30 tablet, Rfl: 0    naproxen (NAPROSYN) 500 mg tablet, Take 1 tablet (500 mg total) by mouth 2 (two) times a day with meals, Disp: 30 tablet, Rfl: 0    Current Allergies     Allergies as of 2021    (No Known Allergies)            The following portions of the patient's history were reviewed and updated as appropriate: allergies, current medications, past family history, past medical history, past social history, past surgical history and problem list      Past Medical History:   Diagnosis Date    Anxiety     Depression     Migraine        Past Surgical History:   Procedure Laterality Date    TUBAL LIGATION         Family History   Problem Relation Age of Onset    Hypertension Mother     Hypertension Father     No Known Problems Sister     COPD Maternal Grandmother     No Known Problems Son     No Known Problems Daughter     No Known Problems Sister     Lung cancer Paternal Grandmother     Heart disease Paternal Grandfather     Stroke Paternal Grandfather          after being bedridden from Stroke few years after initial stroke in     No Known Problems Maternal Aunt     Mental illness Maternal Aunt     Mental illness Maternal Aunt     No Known Problems Maternal Uncle     Lung cancer Paternal Aunt     No Known Problems Paternal Aunt     No Known Problems Paternal Aunt     Mental illness Paternal Aunt     Stroke Paternal Uncle     Stroke Paternal Uncle          after being bedridden for approx 2 years         Medications have been verified  Objective   /69   Pulse 96   Temp 97 7 °F (36 5 °C)   Resp 16   Wt 89 8 kg (198 lb)   SpO2 98%   BMI 32 95 kg/m²   No LMP recorded  Physical Exam     Physical Exam  Vitals and nursing note reviewed  Constitutional:       General: She is not in acute distress  Appearance: Normal appearance  She is obese  She is not ill-appearing, toxic-appearing or diaphoretic     HENT: Head: Normocephalic and atraumatic  Eyes:      Extraocular Movements: Extraocular movements intact  Cardiovascular:      Rate and Rhythm: Normal rate  Pulmonary:      Effort: Pulmonary effort is normal    Musculoskeletal:         General: Tenderness and signs of injury present  No swelling  Cervical back: Normal range of motion  Lumbar back: Tenderness present  No edema  Decreased range of motion  Positive left straight leg raise test    Skin:     General: Skin is warm and dry  Capillary Refill: Capillary refill takes less than 2 seconds  Neurological:      General: No focal deficit present  Mental Status: She is alert and oriented to person, place, and time  Psychiatric:         Mood and Affect: Mood normal          Behavior: Behavior normal          Thought Content:  Thought content normal          Judgment: Judgment normal              Preliminary reading  No acute process seen  Waiting on rad read

## 2021-09-08 ENCOUNTER — APPOINTMENT (OUTPATIENT)
Dept: LAB | Facility: HOSPITAL | Age: 46
End: 2021-09-08

## 2021-09-08 DIAGNOSIS — Z00.8 HEALTH EXAMINATION IN POPULATION SURVEY: ICD-10-CM

## 2021-09-08 LAB
CHOLEST SERPL-MCNC: 297 MG/DL (ref 50–200)
EST. AVERAGE GLUCOSE BLD GHB EST-MCNC: 111 MG/DL
HBA1C MFR BLD: 5.5 %
HDLC SERPL-MCNC: 49 MG/DL
LDLC SERPL CALC-MCNC: 209 MG/DL (ref 0–100)
NONHDLC SERPL-MCNC: 248 MG/DL
TRIGL SERPL-MCNC: 197 MG/DL

## 2021-09-08 PROCEDURE — 36415 COLL VENOUS BLD VENIPUNCTURE: CPT

## 2021-09-08 PROCEDURE — 80061 LIPID PANEL: CPT

## 2021-09-08 PROCEDURE — 83036 HEMOGLOBIN GLYCOSYLATED A1C: CPT

## 2021-09-17 ENCOUNTER — PROCEDURE VISIT (OUTPATIENT)
Dept: NEUROLOGY | Facility: CLINIC | Age: 46
End: 2021-09-17
Payer: COMMERCIAL

## 2021-09-17 VITALS — TEMPERATURE: 98.8 F | DIASTOLIC BLOOD PRESSURE: 70 MMHG | HEART RATE: 75 BPM | SYSTOLIC BLOOD PRESSURE: 112 MMHG

## 2021-09-17 DIAGNOSIS — G43.709 CHRONIC MIGRAINE WITHOUT AURA WITHOUT STATUS MIGRAINOSUS, NOT INTRACTABLE: Primary | ICD-10-CM

## 2021-09-17 PROCEDURE — 64615 CHEMODENERV MUSC MIGRAINE: CPT | Performed by: PHYSICIAN ASSISTANT

## 2021-09-17 NOTE — PROGRESS NOTES
Universal Protocol   Consent: Verbal consent obtained  Written consent obtained  Risks and benefits: risks, benefits and alternatives were discussed  Consent given by: patient  Time out: Immediately prior to procedure a "time out" was called to verify the correct patient, procedure, equipment, support staff and site/side marked as required  Patient understanding: patient states understanding of the procedure being performed  Patient consent: the patient's understanding of the procedure matches consent given  Procedure consent: procedure consent matches procedure scheduled  Relevant documents: relevant documents present and verified  Patient identity confirmed: verbally with patient        Chemodenervation     Date/Time 9/17/2021 2:05 PM     Performed by  Ellyn Pritchett PA-C     Authorized by Ellyn Pritchett PA-C        Pre-procedure details      Prepped With: Alcohol     Anesthesia  (see MAR for exact dosages):      Anesthesia method:  None   Procedure details     Position:  Upright   Botox     Botox Type:  Type A    Brand:  Botox    mL's of Botulinum Toxin:  200    Final Concentration per CC:  50 units    Needle Gauge:  30 G 2 5 inch   Procedures     Botox Procedures: chronic headache      Indications: migraines     Injection Location      Head / Face:  L superior cervical paraspinal, R superior cervical paraspinal, L , R , L frontalis, R frontalis, L medial occipitalis, R medial occipitalis, procerus, R temporalis, L temporalis, R superior trapezius and L superior trapezius    L  injection amount:  5 unit(s)    R  injection amount:  5 unit(s)    L lateral frontalis:  5 unit(s)    R lateral frontalis:  5 unit(s)    L medial frontalis:  5 unit(s)    R medial frontalis:  5 unit(s)    L temporalis injection amount:  20 unit(s)    R temporalis injection amount:  20 unit(s)    Procerus injection amount:  5 unit(s)    L medial occipitalis injection amount:  15 unit(s)    R medial occipitalis injection amount:  15 unit(s)    L superior cervical paraspinal injection amount:  10 unit(s)    R superior cervical paraspinal injection amount:  10 unit(s)    L superior trapezius injection amount:  15 unit(s)    R superior trapezius injection amount:  15 unit(s)   Total Units     Total units used:  200    Total units discarded:  0   Post-procedure details      Chemodenervation:  Chronic migraine    Facial Nerve Location[de-identified]  Bilateral facial nerve    Patient tolerance of procedure:   Tolerated well, no immediate complications   Comments      5 units orbicularis oculi bilaterally  15 units trapezius  5 units semisplenalis capitis bilaterally  10 units frontalis  All medically necessary

## 2021-11-01 ENCOUNTER — HOSPITAL ENCOUNTER (EMERGENCY)
Facility: HOSPITAL | Age: 46
Discharge: HOME | End: 2021-11-01
Attending: EMERGENCY MEDICINE | Admitting: EMERGENCY MEDICINE
Payer: OTHER MISCELLANEOUS

## 2021-11-01 VITALS
RESPIRATION RATE: 16 BRPM | BODY MASS INDEX: 31.62 KG/M2 | OXYGEN SATURATION: 100 % | HEART RATE: 93 BPM | SYSTOLIC BLOOD PRESSURE: 130 MMHG | WEIGHT: 190 LBS | DIASTOLIC BLOOD PRESSURE: 84 MMHG | TEMPERATURE: 98 F
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
## 2021-11-01 LAB
ALT SERPL W P-5'-P-CCNC: 16 U/L (ref 12–78)
HBV SURFACE AB SER-ACNC: <3.1 MIU/ML
HBV SURFACE AG SER QL: NORMAL
HCV AB SER QL: NORMAL

## 2021-11-01 PROCEDURE — 87389 HIV-1 AG W/HIV-1&-2 AB AG IA: CPT | Performed by: EMERGENCY MEDICINE

## 2021-11-01 PROCEDURE — 99282 EMERGENCY DEPT VISIT SF MDM: CPT | Performed by: EMERGENCY MEDICINE

## 2021-11-01 PROCEDURE — 84460 ALANINE AMINO (ALT) (SGPT): CPT | Performed by: EMERGENCY MEDICINE

## 2021-11-01 PROCEDURE — 86706 HEP B SURFACE ANTIBODY: CPT | Performed by: EMERGENCY MEDICINE

## 2021-11-01 PROCEDURE — 36415 COLL VENOUS BLD VENIPUNCTURE: CPT | Performed by: EMERGENCY MEDICINE

## 2021-11-01 PROCEDURE — 87340 HEPATITIS B SURFACE AG IA: CPT | Performed by: EMERGENCY MEDICINE

## 2021-11-01 PROCEDURE — 99283 EMERGENCY DEPT VISIT LOW MDM: CPT

## 2021-11-01 PROCEDURE — 86803 HEPATITIS C AB TEST: CPT | Performed by: EMERGENCY MEDICINE

## 2021-11-03 LAB — HIV 1+2 AB+HIV1 P24 AG SERPL QL IA: NORMAL

## 2021-11-10 ENCOUNTER — DOCUMENTATION (OUTPATIENT)
Dept: OTHER | Facility: OTHER | Age: 46
End: 2021-11-10

## 2021-12-20 ENCOUNTER — PROCEDURE VISIT (OUTPATIENT)
Dept: NEUROLOGY | Facility: CLINIC | Age: 46
End: 2021-12-20
Payer: COMMERCIAL

## 2021-12-20 VITALS — HEART RATE: 104 BPM | TEMPERATURE: 98.4 F | SYSTOLIC BLOOD PRESSURE: 108 MMHG | DIASTOLIC BLOOD PRESSURE: 82 MMHG

## 2021-12-20 DIAGNOSIS — G43.709 CHRONIC MIGRAINE WITHOUT AURA WITHOUT STATUS MIGRAINOSUS, NOT INTRACTABLE: Primary | ICD-10-CM

## 2021-12-20 DIAGNOSIS — G43.009 MIGRAINE WITHOUT AURA AND WITHOUT STATUS MIGRAINOSUS, NOT INTRACTABLE: ICD-10-CM

## 2021-12-20 PROCEDURE — 64615 CHEMODENERV MUSC MIGRAINE: CPT | Performed by: PHYSICIAN ASSISTANT

## 2021-12-20 RX ORDER — RIMEGEPANT SULFATE 75 MG/75MG
75 TABLET, ORALLY DISINTEGRATING ORAL EVERY OTHER DAY
Qty: 16 TABLET | Refills: 11 | Status: SHIPPED | OUTPATIENT
Start: 2021-12-20

## 2022-02-07 ENCOUNTER — APPOINTMENT (OUTPATIENT)
Dept: LAB | Facility: IMAGING CENTER | Age: 47
End: 2022-02-07

## 2022-02-07 DIAGNOSIS — Z77.21 EXPOSURE TO BLOODBORNE PATHOGEN: ICD-10-CM

## 2022-02-07 LAB — HCV AB SER QL: NORMAL

## 2022-02-07 PROCEDURE — 86803 HEPATITIS C AB TEST: CPT

## 2022-03-11 ENCOUNTER — TELEPHONE (OUTPATIENT)
Dept: NEUROLOGY | Facility: CLINIC | Age: 47
End: 2022-03-11

## 2022-03-14 ENCOUNTER — TELEPHONE (OUTPATIENT)
Dept: NEUROLOGY | Facility: CLINIC | Age: 47
End: 2022-03-14

## 2022-03-14 NOTE — TELEPHONE ENCOUNTER
Submitted prior-authorization request via fax to [facility] for Botox 200 units-  on 3/11/2022   Awaiting approval/denial

## 2022-03-16 NOTE — TELEPHONE ENCOUNTER
Received approved authorization from [facility] via Fax       Approved   Botox 200 units   Auth# [id number]  Valid- 3/24/2022 until 3/24/2023   4 visits     Please use our [employer]

## 2022-03-24 ENCOUNTER — PROCEDURE VISIT (OUTPATIENT)
Dept: NEUROLOGY | Facility: CLINIC | Age: 47
End: 2022-03-24
Payer: COMMERCIAL

## 2022-03-24 VITALS — DIASTOLIC BLOOD PRESSURE: 70 MMHG | HEART RATE: 104 BPM | TEMPERATURE: 97 F | SYSTOLIC BLOOD PRESSURE: 112 MMHG

## 2022-03-24 DIAGNOSIS — G43.709 CHRONIC MIGRAINE WITHOUT AURA WITHOUT STATUS MIGRAINOSUS, NOT INTRACTABLE: Primary | ICD-10-CM

## 2022-03-24 PROCEDURE — 64615 CHEMODENERV MUSC MIGRAINE: CPT | Performed by: PHYSICIAN ASSISTANT

## 2022-03-24 NOTE — PROGRESS NOTES
Universal Protocol   Consent: Verbal consent obtained  Written consent obtained  Risks and benefits: risks, benefits and alternatives were discussed  Consent given by: patient  Time out: Immediately prior to procedure a "time out" was called to verify the correct patient, procedure, equipment, support staff and site/side marked as required  Patient understanding: patient states understanding of the procedure being performed  Patient consent: the patient's understanding of the procedure matches consent given  Procedure consent: procedure consent matches procedure scheduled  Relevant documents: relevant documents present and verified  Patient identity confirmed: verbally with patient        Chemodenervation     Date/Time 3/24/2022 2:25 PM     Performed by  Aneta Tony PA-C     Authorized by Aneta Tony PA-C        Pre-procedure details      Prepped With: Alcohol     Anesthesia  (see MAR for exact dosages):      Anesthesia method:  None   Procedure details     Position:  Upright   Botox     Botox Type:  Type A    Brand:  Botox    mL's of Botulinum Toxin:  200    Final Concentration per CC:  50 units    Needle Gauge:  30 G 2 5 inch   Procedures     Botox Procedures: chronic headache      Indications: migraines     Injection Location      Head / Face:  L superior cervical paraspinal, R superior cervical paraspinal, L , R , L frontalis, R frontalis, L medial occipitalis, R medial occipitalis, procerus, R temporalis, L temporalis, R superior trapezius and L superior trapezius    L  injection amount:  5 unit(s)    R  injection amount:  5 unit(s)    L lateral frontalis:  5 unit(s)    R lateral frontalis:  5 unit(s)    L medial frontalis:  5 unit(s)    R medial frontalis:  5 unit(s)    L temporalis injection amount:  20 unit(s)    R temporalis injection amount:  20 unit(s)    Procerus injection amount:  5 unit(s)    L medial occipitalis injection amount:  15 unit(s)    R medial occipitalis injection amount:  15 unit(s)    L superior cervical paraspinal injection amount:  10 unit(s)    R superior cervical paraspinal injection amount:  10 unit(s)    L superior trapezius injection amount:  15 unit(s)    R superior trapezius injection amount:  15 unit(s)   Total Units     Total units used:  200    Total units discarded:  0   Post-procedure details      Chemodenervation:  Chronic migraine    Facial Nerve Location[de-identified]  Bilateral facial nerve    Patient tolerance of procedure:   Tolerated well, no immediate complications   Comments       5 units orbicularis oculi bilaterally  15 units trapezius  5 units semisplenalis capitis bilaterally  10 units frontalis  All medically necessary

## 2022-04-20 ENCOUNTER — TELEMEDICINE (OUTPATIENT)
Dept: NEUROLOGY | Facility: CLINIC | Age: 47
End: 2022-04-20
Payer: COMMERCIAL

## 2022-04-20 DIAGNOSIS — G54.0 NEUROPATHY OF LEFT BRACHIAL PLEXUS: Primary | ICD-10-CM

## 2022-04-20 DIAGNOSIS — M54.12 CERVICAL RADICULOPATHY: ICD-10-CM

## 2022-04-20 DIAGNOSIS — M50.922 UNSPECIFIED CERVICAL DISC DISORDER AT C5-C6 LEVEL: ICD-10-CM

## 2022-04-20 DIAGNOSIS — M54.2 CERVICALGIA: ICD-10-CM

## 2022-04-20 DIAGNOSIS — G43.709 CHRONIC MIGRAINE WITHOUT AURA WITHOUT STATUS MIGRAINOSUS, NOT INTRACTABLE: ICD-10-CM

## 2022-04-20 PROCEDURE — 99215 OFFICE O/P EST HI 40 MIN: CPT | Performed by: PHYSICIAN ASSISTANT

## 2022-04-20 RX ORDER — METHYLPREDNISOLONE 4 MG/1
TABLET ORAL
Qty: 1 EACH | Refills: 0 | Status: SHIPPED | OUTPATIENT
Start: 2022-04-20

## 2022-04-20 RX ORDER — FAMOTIDINE 40 MG/1
40 TABLET, FILM COATED ORAL DAILY
COMMUNITY
Start: 2022-03-31

## 2022-04-20 RX ORDER — METHOCARBAMOL 750 MG/1
750 TABLET, FILM COATED ORAL EVERY 8 HOURS PRN
Qty: 30 TABLET | Refills: 0 | Status: SHIPPED | OUTPATIENT
Start: 2022-04-20

## 2022-04-20 NOTE — PROGRESS NOTES
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    Current Preventative:  Nurtec  Baclofen  Botox    Current Abortive:  Decadron  Nurtec  Olanzapine  prochlorperazine    Prior PREVENTATIVE:  melatonin, multivitamin, B12, biotin, vitamin-D  Depakote, gabapentin  amitriptyline (gain weight on it) venlafaxine, cymbalta  Robaxin, tizanidine    Prior ABORTIVE:  methylprednisolone,   ketorolac, diclofenac  Fioricet (stopped in )  Depakote  Excedrin, Aleve, ibuprofen  DHE  Sumatriptan, Rizatriptan    Headache are worse if the patient: cough, sneeze, bending over  Headache triggers:  Not sure     Aura/Warning and how long does it last - blurry vision - within half an hour     What is your current pain level - 0/10     How often do the headaches occur?   mild headache: for past month has had more, almost daily; prior to Botox was daily  Moderate headaches 1-2 a week; prior to Botox was daily  Severe headache: 5 a month; prior to Botox was daily     What time of the day do the headaches start?   mild pain: varies  Severe headache: varies     How long do the headaches last?   mild headache:  a few hours  Severe headache: 1-2 days      Are you ever headache free? yes     Describe your usual headache -   mild pain: Throbbing  Severe pain: Stabbing     Where is your headache located?   mild headache: anywhere in the head  Severe headache: right frontal and parietal region     What is the intensity of pain?   mild headaches:  10  Moderate headaches: 8/10   Severe headaches: 10/10      Associated symptoms:   · Decrease of appetite, nausea, vomiting, diarrhea  · Insomnia  · Photophobia, phonophobia, sensitivity to smell   · Problem with concentration  · Blurred vision,   · Pupil dilated on the right  · Hands or feet tingle or feel numb  ·  flushing of face  · Lacrimation, runny or stuffed-up nose,   · light-headed or dizzy, stiff or sore neck  · prefer to be alone and in a dark room, unable to work     Number of days missed per month because of headaches:  Work (or school) days: missed 1 day but this was right before last Botox  Social or Family activities:  none, was often before Botox     What time of the year do headaches occur more frequently? Not sure  Have you seen someone else for headaches or pain? Yes  Have you had trigger point injection performed and how often? No  Have you had Botox injection performed and how often? Yes  Have you had epidural injections or transforaminal injections performed? Yes     EM2018:   This is an abnormal study  There is electrophysiological evidence consistent with an active subacute to chronic left C5/C6 radiculopathy     There was no electrophysiological evidence of median or ulnar entrapment neuropathy, or brachial plexopathy in the left upper extremity      Sleep Habit  Is your sleep restful? yes  How often do you get up at night? 0-1  Do you wake up with headaches? yes sometimes  Do you snore while asleep? no  Have you been told that you stop breathing during sleeping?    no  Do you wake up tired in the morning? no  Do you take frequent naps during the day? no  Do you have jaw pain? yes  Do you grind/clench your teeth at night? yes  Do you have restless leg syndrome? no  Do you have nightmare or sleep walk? no     Alternative therapies used in the past for headaches? physical therapy  Have you used CBD or THC for your headaches and how often? No  Are you current pregnant or planning on getting pregnant? No  Have you ever had any Brain imaging? yes        I personally reviewed these images      With botox has had a reduction of at least 7 migraine days with less abortive medication, less ER visits which correlates to headache diary     Reviewed old notes from physician seen in the past- see above HPI for summary of previous encounters         Past Medical History:   Diagnosis Date    Anxiety     Depression     Migraine        Patient Active Problem List   Diagnosis    Vitamin D deficiency    Chronic migraine without aura without status migrainosus, not intractable    Left arm weakness    Anxiety and depression    Cervical radiculopathy    Trigeminal autonomic cephalgias    Peripheral neuropathy    Positive cardiolipin antibodies    Motion sickness    BMI 30 0-30 9,adult    Acute cystitis with hematuria    Myofascial pain syndrome       Medications:      Current Outpatient Medications   Medication Sig Dispense Refill    baclofen 10 mg tablet TAKE 1 TABLET BY MOUTH DAILY AT BEDTIME 30 tablet 4    Botulinum Toxin Type A 200 units SOLR Inject up to 200 units IM by physician into the head and neck muscles every three months 200 Units 4    dexamethasone (DECADRON) 2 mg tablet Take 1 tablet (2 mg total) by mouth daily with breakfast 5 tablet 0    diclofenac sodium (VOLTAREN) 50 mg EC tablet Take 1 tablet (50 mg total) by mouth 2 (two) times a day as needed (Moderate pain) 60 tablet 1    divalproex sodium (DEPAKOTE ER) 250 mg 24 hr tablet 2 tablets at bedtime for 3 days then 1 tablet at bedtime for 2 days 8 tablet 0    lidocaine (LIDODERM) 5 % Apply 1 patch topically daily Remove & Discard patch within 12 hours or as directed by MD 15 patch 0    methocarbamol (ROBAXIN) 500 mg tablet Take 1 tablet (500 mg total) by mouth 3 (three) times a day as needed for muscle spasms 30 tablet 0    naproxen (NAPROSYN) 500 mg tablet Take 1 tablet (500 mg total) by mouth 2 (two) times a day with meals 30 tablet 0    OLANZapine (ZyPREXA) 5 mg tablet Take 1 tablet (5 mg total) by mouth daily at bedtime 5 tablet 0    prochlorperazine (COMPAZINE) 10 mg tablet 1 tab at onset of migraine, can repeat in 8 hours, can take with triptan/NSAID 10 tablet 3    Rimegepant Sulfate (Nurtec) 75 MG TBDP Take 75 mg by mouth as needed (migraine) 8 tablet 3    Rimegepant Sulfate (Nurtec) 75 MG TBDP Take 75 mg by mouth every other day 16 tablet 11    rizatriptan (MAXALT) 10 MG tablet 1 at onset of a migraine headache May repeat every 2 hours if needed, max 20mg/24 hours 12 tablet 3     No current facility-administered medications for this visit          Allergies:    No Known Allergies    Family History:     Family History   Problem Relation Age of Onset    Hypertension Mother     Hypertension Father     No Known Problems Sister     COPD Maternal Grandmother     No Known Problems Son     No Known Problems Daughter     No Known Problems Sister     Lung cancer Paternal Grandmother     Heart disease Paternal Grandfather     Stroke Paternal Durene Toshia          after being bedridden from Stroke few years after initial stroke in 200    No Known Problems Maternal Aunt     Mental illness Maternal Aunt     Mental illness Maternal Aunt     No Known Problems Maternal Uncle     Lung cancer Paternal Aunt     No Known Problems Paternal Aunt     No Known Problems Paternal Aunt     Mental illness Paternal Aunt     Stroke Paternal Uncle     Stroke Paternal Uncle          after being bedridden for approx 2 years       Social History:     Social History     Socioeconomic History    Marital status: /Civil Union     Spouse name: Not on file    Number of children: Not on file    Years of education: Not on file    Highest education level: Not on file   Occupational History    Occupation:    Tobacco Use    Smoking status: Never Smoker    Smokeless tobacco: Never Used   Vaping Use    Vaping Use: Never used   Substance and Sexual Activity    Alcohol use: Yes     Alcohol/week: 2 0 standard drinks     Types: 1 Glasses of wine, 1 Standard drinks or equivalent per week     Comment: Drink socially varying 1-2 drink per 2 wks, wine or mixed dr Harlan Avery Drug use: No    Sexual activity: Yes     Partners: Male     Birth control/protection: Female Sterilization     Comment: Tubal ligation in 2004   Other Topics Concern    Not on file   Social History Narrative    Caffeine use    Pt lives with daughter     Social Determinants of Health     Financial Resource Strain: Not on file   Food Insecurity: Not on file   Transportation Needs: Not on file   Physical Activity: Not on file   Stress: Not on file   Social Connections: Not on file   Intimate Partner Violence: Not on file   Housing Stability: Not on file         Objective:   Physical Exam:                                                                   Vitals: There were no vitals taken for this visit  BP Readings from Last 3 Encounters:   03/24/22 112/70   12/20/21 108/82   11/01/21 130/84     Pulse Readings from Last 3 Encounters:   03/24/22 104   12/20/21 104   11/01/21 93                 Review of Systems:   Review of Systems    I personally reviewed the ROS entered by the MA    I spent *** minutes in face-to-face discussion regarding  the pathophysiology of {Desc; his/her:52565} current symptoms and further plan, as well as counseling, educating, and coordinating the patient's care including {COUNSELING REASONS FOR TIME BASED DOCUMENTATION:20625} and spent *** minutes non-face to face    Author:  Remy Nicholson PA-C 4/20/2022 7:49 AM

## 2022-04-20 NOTE — ASSESSMENT & PLAN NOTE
Preventive therapy for headaches:   Continue Botox every 3 months  Nurtec 75 mg every other day  Abortive therapy for headaches: At the onset of a severe headache take Nurtec 75 mg if have not already taken that day  Limit of 1 in 24 hours  If that fails, use ibuprofen and Benadryl 50 mg

## 2022-04-20 NOTE — PROGRESS NOTES
Virtual Regular Visit    Verification of patient location:    Patient is located in the following state in which I hold an active license [place]      Assessment/Plan:    Problem List Items Addressed This Visit        Cardiovascular and Mediastinum    Chronic migraine without aura without status migrainosus, not intractable     Preventive therapy for headaches:   Continue Botox every 3 months  Nurtec 75 mg every other day  Abortive therapy for headaches: At the onset of a severe headache take Nurtec 75 mg if have not already taken that day  Limit of 1 in 24 hours  If that fails, use ibuprofen and Benadryl 50 mg             Relevant Medications    methocarbamol (Robaxin-750) 750 mg tablet       Nervous and Auditory    Cervical radiculopathy     Will obtain MRI of C spine--if normal can consider MRI of brachial plexus  Call pain management  Will start PT  Medrol dose pack--use directions per pack  Robaxin 750 mg at least once a day but may use up to 3 times a day         Neuropathy of left brachial plexus - Primary     Will obtain MRI of C spine--if normal can consider MRI of brachial plexus  Call pain management  Will start PT  Medrol dose pack--use directions per pack  Robaxin 750 mg at least once a day but may use up to 3 times a day         Relevant Orders    Ambulatory referral to Physical Therapy       Musculoskeletal and Integument    Unspecified cervical disc disorder at C5-C6 level    Relevant Medications    methylPREDNISolone 4 MG tablet therapy pack    methocarbamol (Robaxin-750) 750 mg tablet    Other Relevant Orders    MRI cervical spine wo contrast       Other    Cervicalgia    Relevant Medications    methylPREDNISolone 4 MG tablet therapy pack    methocarbamol (Robaxin-750) 750 mg tablet    Other Relevant Orders    MRI cervical spine wo contrast               Reason for visit is   Chief Complaint   Patient presents with    Migraine    Virtual Regular Visit        Encounter provider Barry Mackenzie MICKY    Provider located at [address]  [place] [address]      Recent Visits  No visits were found meeting these conditions  Showing recent visits within past 7 days and meeting all other requirements  Today's Visits  Date Type Provider Dept   04/20/22 Telemedicine Noris Lopez PA-C [facility] today's visits and meeting all other requirements  Future Appointments  No visits were found meeting these conditions  Showing future appointments within next 150 days and meeting all other requirements       The patient was identified by name and date of birth  Ernieluis Castro was informed that this is a telemedicine visit and that the visit is being conducted through 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           Patient states that she is having more neck and shoulder pain  This has been worsening over the past 3-6 months  Now has the pins and needles on her left side to her shoulder and fingers  DId have prior injections and treatment with pain management, last seen in 2020  Has been doing home PT and stretching exercises without any relief    Throbbing pain in arm pit (worse) and then posterior arm to elbow (annoying)  Does get tingling in her fingertips (unsure if all or some), this happens more if done a lot of lifting  States she does have intermittent weakness in her left arm        Chronic migraine headaches:   What medications do you take or have you taken for your headaches?    Current Preventative:  Nurtec  Baclofen  Botox    Current Abortive:  Decadron  Nurtec      Prior PREVENTATIVE:  melatonin, multivitamin, B12, biotin, vitamin-D  Depakote, gabapentin  amitriptyline (gain weight on it) venlafaxine, cymbalta  Robaxin, tizanidine    Prior ABORTIVE:  methylprednisolone,   ketorolac, diclofenac  Fioricet (stopped in 2015)  Depakote, Gabapentin (feels like been out drinking), Olanzapine  prochlorperazine  Excedrin, Aleve, ibuprofen  DHE  Sumatriptan, Rizatriptan    Headache are worse if the patient: cough, sneeze, bending over  Headache triggers:  Not sure     Aura/Warning and how long does it last - blurry vision - within half an hour     What is your current pain level - 0/10     How often do the headaches occur?   mild headache: 1-2 a week; prior to Botox was daily  Moderate headaches 1-2 a month; prior to Botox was daily  Severe headache: 1-2 a month; prior to Botox was daily     What time of the day do the headaches start?   mild pain: varies  Severe headache: varies     How long do the headaches last?   mild headache:  a few hours  Severe headache: 1-2 days      Are you ever headache free? yes     Describe your usual headache -   mild pain: Throbbing  Severe pain: Stabbing     Where is your headache located?   mild headache: anywhere in the head  Severe headache: right frontal and parietal region     What is the intensity of pain?   mild headaches:  3-4/10  Moderate headaches: 8/10   Severe headaches: 10/10      Associated symptoms:   Decrease of appetite, nausea, vomiting, diarrhea  Insomnia  Photophobia, phonophobia, sensitivity to smell   Problem with concentration  Blurred vision,   Pupil dilated on the right  Hands or feet tingle or feel numb   flushing of face  Lacrimation, runny or stuffed-up nose,   light-headed or dizzy, stiff or sore neck  prefer to be alone and in a dark room, unable to work     Number of days missed per month because of headaches:  Work (or school) [address] or Family activities:  none, was often before Botox     What time of the year do headaches occur more frequently? Not sure  Have you seen someone else for headaches or pain? Yes  Have you had trigger point injection performed and how often? No  Have you had Botox injection performed and how often? Yes  Have you had epidural injections or transforaminal injections performed? Yes     EM2018:   This is an abnormal study  There is electrophysiological evidence consistent with an active subacute to chronic left C5/C6 radiculopathy     There was no electrophysiological evidence of median or ulnar entrapment neuropathy, or brachial plexopathy in the left upper extremity      Sleep Habit  Is your sleep restful? yes  How often do you get up at night? 0-1  Do you wake up with headaches? yes sometimes  Do you snore while asleep? no  Have you been told that you stop breathing during sleeping?    no  Do you wake up tired in the morning? no  Do you take frequent naps during the day? no  Do you have jaw pain? yes  Do you grind/clench your teeth at night? yes  Do you have restless leg syndrome? no  Do you have nightmare or sleep walk? no     Alternative therapies used in the past for headaches? physical therapy  Have you used CBD or THC for your headaches and how often? No  Are you current pregnant or planning on getting pregnant? No  Have you ever had any Brain imaging? yes        I personally reviewed these images      With botox has had a reduction of at least 7 migraine days with less abortive medication, less ER visits which correlates to headache diary     Reviewed old notes from physician seen in the past- see above HPI for summary of previous encounters           Past Medical History:   Diagnosis Date    Anxiety     Depression     Migraine        Past Surgical History:   Procedure Laterality Date    TUBAL LIGATION         Current Outpatient Medications   Medication Sig Dispense Refill    Botulinum Toxin Type A 200 units SOLR Inject up to 200 units IM by physician into the head and neck muscles every three months 200 Units 4    famotidine (PEPCID) 40 MG tablet Take 40 mg by mouth daily      Rimegepant Sulfate (Nurtec) 75 MG TBDP Take 75 mg by mouth every other day 16 tablet 11    methocarbamol (Robaxin-750) 750 mg tablet Take 1 tablet (750 mg total) by mouth every 8 (eight) hours as needed for muscle spasms 30 tablet 0    methylPREDNISolone 4 MG tablet therapy pack Use as directed on package 1 each 0    naproxen (NAPROSYN) 500 mg tablet Take 1 tablet (500 mg total) by mouth 2 (two) times a day with meals (Patient not taking: Reported on 4/20/2022 ) 30 tablet 0    OLANZapine (ZyPREXA) 5 mg tablet Take 1 tablet (5 mg total) by mouth daily at bedtime (Patient not taking: Reported on 4/20/2022 ) 5 tablet 0    Rimegepant Sulfate (Nurtec) 75 MG TBDP Take 75 mg by mouth as needed (migraine) (Patient not taking: Reported on 4/20/2022 ) 8 tablet 3     No current facility-administered medications for this visit  No Known Allergies   I have reviewed the patient's medical, social and surgical history as well as medications in detail and updated the computerized patient record  Review of Systems   Constitutional: Negative  HENT: Negative  Eyes: Negative  Respiratory: Negative  Cardiovascular: Negative  Gastrointestinal: Negative  Endocrine: Negative  Genitourinary: Negative  Musculoskeletal: Positive for neck pain  Skin: Negative  Allergic/Immunologic: Negative  Neurological: Positive for headaches  Left neuropathy   Hematological: Negative  Psychiatric/Behavioral: Negative  I personally reviewed and updated the ROS that was entered by the medical assistant        Video Exam    There were no vitals filed for this visit      Physical Exam   CONSTITUTIONAL: Well developed, well nourished, well groomed  No dysmorphic features  Eyes:  EOM normal      Neck:  Normal ROM, neck supple  HEENT:  Normocephalic atraumatic  Chest:  Respirations regular and unlabored  Psychiatric:  Normal behavior and appropriate affect      MENTAL STATUS  Orientation: Alert and oriented x 3  Fund of knowledge: Intact  MOTOR (Upper and lower extremities)   Bulk/tone/abnormal movement: Normal muscle bulk and tone  Left axilla has constant dull pain (currently 2/10) but does get worse with more activity (at worst 7/10 on days that she works)    Sensation to light touch intact per patient guided exam    I spent 31 minutes with patient today in which greater than 50% of the time was spent in counseling/coordination of care regarding as above and 20 minutes of non-face to face time    VIRTUAL VISIT [facility] verbally agrees to participate in [employer]  Pt is aware that [employer] could be limited without vital signs or the ability to perform a full hands-on physical Linda Grief understands she or the provider may request at any time to terminate the video visit and request the patient to seek care or treatment in person

## 2022-04-20 NOTE — ASSESSMENT & PLAN NOTE
Will obtain MRI of C spine--if normal can consider MRI of brachial plexus  Call pain management  Will start PT  Medrol dose pack--use directions per pack  Robaxin 750 mg at least once a day but may use up to 3 times a day

## 2022-04-20 NOTE — PATIENT INSTRUCTIONS
Preventive therapy for headaches:   Continue Botox every 3 months  Nurtec 75 mg every other day  Abortive therapy for headaches: At the onset of a severe headache take Nurtec 75 mg if have not already taken that day  Limit of 1 in 24 hours  If that fails, use ibuprofen and Benadryl 50 mg  For left axilla pain and radiculopathy  Will obtain MRI of C spine--if normal can consider MRI of brachial plexus  Call pain management  Will start PT  Medrol dose pack--use directions per pack  Robaxin 750 mg at least once a day but may use up to 3 times a day      Neck pain:   - We discussed the role of neck pathology and poor posture, with straightening of the normal cervical lordosis, in headaches  We discussed how tightening of the neck muscles can irritate the nerves in the occipital region of her head and cause or worsen head pain  We also discussed and demonstrated neck strengthening and relaxation exercises, as well as giving written instructions on these exercises  - We talked about the importance of good posture for improving shoulder, neck, and head pain  The patient was given visualization exercises for correcting posture, which patient will practice at home  If this simple exercise does not help improve the posture, we will consider formal physical therapy in the future  Medication overuse headaches:   - We discussed medication overuse headache [facility]) and how to avoid it in the future  It was explained that all analgesics have the potential to cause medication overuse headache [facility]) and analgesic overuse can negate the effectiveness of headache preventive measures  After successful [address] treatment, preventive medications for an underlying primary headache disorder have a greater chance for success  Avoid medications with narcotics, barbiturates, or caffeine in them as these can cause rebound headaches after very few doses and can interfere with other headache medicine efficacy   Taking any analgesics for more than 2-3 days a week can cause medication overuse headache  Reproductive age women: Should take folic acid daily when taking anti-seizure drugs especially Depakote  [address] [facility] Prescription Drug Monitoring Program report was reviewed and was appropriate      Headache management instructions  - When patient has a moderate to severe headache, they should seek rest, initiate relaxation and apply cold compresses to the head  - Maintain regular sleep schedule  Adults need at least 7-8 hours of uninterrupted a night  - Limit over the counter medications such as Tylenol, Ibuprofen, Aleve, Excedrin  (No more than 3 times a week)  - Maintain headache diary  We discussed an NOEL for a smart phone is "Migraine juan ramon"  - Limit caffeine to 1-2 cups 8 to 16 oz a day or less  - Avoid dietary trigger  (aged cheese, peanuts, MSG, aspartame and nitrates)  - Patient is to have regular frequent meals to prevent headache onset  - Please drink at least 64 ounces of water a day to help remain hydrated  Please call with any questions or concerns   Office number is [phone]

## 2022-05-04 ENCOUNTER — APPOINTMENT (OUTPATIENT)
Dept: LAB | Facility: IMAGING CENTER | Age: 47
End: 2022-05-04

## 2022-05-04 ENCOUNTER — APPOINTMENT (OUTPATIENT)
Dept: LAB | Facility: IMAGING CENTER | Age: 47
End: 2022-05-04
Payer: COMMERCIAL

## 2022-05-04 DIAGNOSIS — Z00.8 HEALTH EXAMINATION IN POPULATION SURVEYS: ICD-10-CM

## 2022-05-04 DIAGNOSIS — Z77.21 EXPOSURE TO BLOODBORNE PATHOGEN: ICD-10-CM

## 2022-05-04 LAB
ALBUMIN SERPL BCP-MCNC: 3.4 G/DL (ref 3.5–5)
ALP SERPL-CCNC: 82 U/L (ref 46–116)
ALT SERPL W P-5'-P-CCNC: 24 U/L (ref 12–78)
ANION GAP SERPL CALCULATED.3IONS-SCNC: 7 MMOL/L (ref 4–13)
AST SERPL W P-5'-P-CCNC: 17 U/L (ref 5–45)
BILIRUB SERPL-MCNC: 0.32 MG/DL (ref 0.2–1)
BUN SERPL-MCNC: 13 MG/DL (ref 5–25)
CALCIUM ALBUM COR SERPL-MCNC: 9.6 MG/DL (ref 8.3–10.1)
CALCIUM SERPL-MCNC: 9.1 MG/DL (ref 8.3–10.1)
CHLORIDE SERPL-SCNC: 107 MMOL/L (ref 100–108)
CHOLEST SERPL-MCNC: 270 MG/DL
CHOLEST SERPL-MCNC: 271 MG/DL
CO2 SERPL-SCNC: 24 MMOL/L (ref 21–32)
CREAT SERPL-MCNC: 1.04 MG/DL (ref 0.6–1.3)
ERYTHROCYTE [DISTWIDTH] IN BLOOD BY AUTOMATED COUNT: 12.7 % (ref 11.6–15.1)
GFR SERPL CREATININE-BSD FRML MDRD: 64 ML/MIN/1.73SQ M
GLUCOSE P FAST SERPL-MCNC: 105 MG/DL (ref 65–99)
HCT VFR BLD AUTO: 41.4 % (ref 34.8–46.1)
HCV AB SER QL: NORMAL
HDLC SERPL-MCNC: 42 MG/DL
HDLC SERPL-MCNC: 44 MG/DL
HGB BLD-MCNC: 13.4 G/DL (ref 11.5–15.4)
MCH RBC QN AUTO: 29.8 PG (ref 26.8–34.3)
MCHC RBC AUTO-ENTMCNC: 32.4 G/DL (ref 31.4–37.4)
MCV RBC AUTO: 92 FL (ref 82–98)
NONHDLC SERPL-MCNC: 227 MG/DL
NONHDLC SERPL-MCNC: 228 MG/DL
PLATELET # BLD AUTO: 231 THOUSANDS/UL (ref 149–390)
PMV BLD AUTO: 12.2 FL (ref 8.9–12.7)
POTASSIUM SERPL-SCNC: 3.9 MMOL/L (ref 3.5–5.3)
PROT SERPL-MCNC: 7.1 G/DL (ref 6.4–8.2)
RBC # BLD AUTO: 4.49 MILLION/UL (ref 3.81–5.12)
SODIUM SERPL-SCNC: 138 MMOL/L (ref 136–145)
TRIGL SERPL-MCNC: 461 MG/DL
TRIGL SERPL-MCNC: 474 MG/DL
TSH SERPL DL<=0.05 MIU/L-ACNC: 2.4 UIU/ML (ref 0.45–4.5)
WBC # BLD AUTO: 6.56 THOUSAND/UL (ref 4.31–10.16)

## 2022-05-04 PROCEDURE — 80061 LIPID PANEL: CPT

## 2022-05-04 PROCEDURE — 84443 ASSAY THYROID STIM HORMONE: CPT

## 2022-05-04 PROCEDURE — 80053 COMPREHEN METABOLIC PANEL: CPT

## 2022-05-04 PROCEDURE — 86803 HEPATITIS C AB TEST: CPT

## 2022-05-04 PROCEDURE — 36415 COLL VENOUS BLD VENIPUNCTURE: CPT

## 2022-05-04 PROCEDURE — 85027 COMPLETE CBC AUTOMATED: CPT

## 2022-05-04 PROCEDURE — 83036 HEMOGLOBIN GLYCOSYLATED A1C: CPT

## 2022-05-04 PROCEDURE — 87389 HIV-1 AG W/HIV-1&-2 AB AG IA: CPT

## 2022-05-05 LAB
EST. AVERAGE GLUCOSE BLD GHB EST-MCNC: 114 MG/DL
HBA1C MFR BLD: 5.6 %

## 2022-05-06 LAB — HIV 1+2 AB+HIV1 P24 AG SERPL QL IA: NORMAL

## 2022-05-13 ENCOUNTER — HOSPITAL ENCOUNTER (OUTPATIENT)
Dept: RADIOLOGY | Facility: IMAGING CENTER | Age: 47
Discharge: HOME | End: 2022-05-13
Payer: COMMERCIAL

## 2022-05-13 VITALS — HEIGHT: 65 IN | WEIGHT: 197 LBS | BODY MASS INDEX: 32.82 KG/M2

## 2022-05-13 DIAGNOSIS — Z12.31 ENCOUNTER FOR SCREENING MAMMOGRAM FOR BREAST CANCER: ICD-10-CM

## 2022-05-13 PROCEDURE — 77067 SCR MAMMO BI INCL CAD: CPT

## 2022-05-13 PROCEDURE — 77063 BREAST TOMOSYNTHESIS BI: CPT

## 2022-05-17 NOTE — PROGRESS NOTES
PT Evaluation  and PT Discharge    Today's date: 2022  Patient name: Jolanta Waldron  : 1975  MRN: [id number]  Referring provider: Lei Rahman PA-C  Dx:   Encounter Diagnosis     ICD-10-CM    1  Neuropathy of left brachial plexus  G54 0                   Assessment  Assessment details: ADDENDUM: 6/15/2022: Jolanta Waldron is a 56 y o  female that has attended 1 session of physical therapy will be discharged from formal PT at this time  The patient NO SHOWED and did not return our calls to reschedule  NO NEW OBJECTIVE MEASURES WERE COMPLETED  D/C FORMAL PT  Jolanta Waldron is a 56 y o  female with a history of anxiety, depression, migraines, cervical radiculopathy that presents for a high complexity physical therapy initial evaluation  The patient demonstrates signs and symptoms consistent with L brachial plexus neuropathy; L UE muscle weakness/pain  During the examination the patient demonstrated mild decreased L UE strength, decreased L shoulder extension, activity intolerance, and L>R UE and L scapula pain  The patient's impairments are causing the following functional limitations: Difficulty lifting/carrying objects heavier than 10 pounds, frequent headaches/migraines, difficulty reaching over head, and disturbed sleep  The patient's clinical presentation is evolving due to a number of participation restrictions, significant medial history, and functional limitation (FOTO 70% function)  The patient will benefit from skilled PT services to address impairments, work towards goals, and restore PLOF        Impairments: abnormal or restricted ROM, abnormal movement, activity intolerance, impaired physical strength, lacks appropriate home exercise program, pain with function and poor posture   Functional limitations: Difficulty lifting/carrying objects heavier than 10 pounds, frequent headaches/migraines, difficulty reaching over head, and disturbed sleep  Symptom irritability: moderateBarriers to therapy: Chronicity - the patient was upset at the end of the exam due to frustration from dealing with her symptoms  Understanding of Dx/Px/POC: fair   Prognosis: fair    Goals  GOALS N/A DUE TO PATIENT SELF DISCHARGED    STG: ACHIEVE in 4 -6 weeks  1  Improve L UE/ L scapula pain at worst by 50% to improve ADL's   2   Improve L UE strength by 1/2-1 muscle grade to improve lifting/carrying tasks  3   Improve L shoulder extension ROM by 15 degrees to improve the ability to reach over head  LTG:  Achieve in 8-12 weeks  1  Patient return to full function without pain greater than 3/10 in L UE to achieve their personal goal   2   Patient's L shoulder strength return to equal for both sides to lift/carry items without pain/difficulty  3   Patient's L shoulder extension ROM improve to WNL to perform all ADL's and overhead tasks without difficulty  4   Patient to be independent with home exercise plan at time of discharge  5  Patient's L UE FOTO score improve to > 72% to indicate a return to normal function  Plan  Plan details: ADDENDUM: 6/15/2022: Mukesh Cooper is a 56 y o  female that has attended 1 session of physical therapy will be discharged from formal PT at this time  The patient NO SHOWED and did not return our calls to reschedule  NO NEW OBJECTIVE MEASURES WERE COMPLETED  D/C FORMAL PT  Treatment plan discussed with: PTA and patient        Subjective Evaluation    History of Present Illness  Date of onset: 10/10/2018  Mechanism of injury: ADDENDUM: 6/15/2022: Mukesh Cooper is a 56 y o  female that has attended 1 session of physical therapy will be discharged from formal PT at this time  The patient NO SHOWED and did not return our calls to reschedule  NO NEW OBJECTIVE MEASURES WERE COMPLETED   D/C FORMAL PT       INJURY HISTORY: Mukesh Cooper is a 56 y o  female that presents to outpatient physical therapy with complaints of pins and needles at her L palm/hand, pain at her L axilla and down her L posterior arm  The patient reports these complaints feel like they are coming from the cervical spine   The patient notes there are times when the symptoms occur down the right arm but are primarily located at the L arm  The patient does not have any neck pain  The patient feels these symptoms started after riding a roller coaster multiple times in a row roughly 3-4 years ago  The patient suffers from migraine headaches and the frequency has increased over the past 2-3 months  The patient notes difficulty with doing yard work - she gets increased pain/symptoms the day afterwards  The patient notes she can sleep wrong and she wakes up with increased symptoms  The patient's main goal for physical therapy is to eliminate the " annoying/nagging" pain or feeling  Recurrent probem    Pain  Current pain ratin  At best pain ratin  At worst pain ratin  Location: L axilla ( ache/throb), L posterior arm, L scapula ( squeezing)  Quality: dull ache, throbbing, squeezing and discomfort  Aggravating factors: lifting  Progression: worsening    Social Support  Lives with: significant other    Employment status: working (inventory control; lifting/computer work)  Hand dominance: right    Treatments  Previous treatment: physical therapy  Patient Goals  Patient goal: eliminate annoying/nagging pain (N/A)        Objective     Concurrent Complaints  Positive for headaches (low grade daily; migraine:2-3x/month)  Negative for night pain, disturbed sleep, dizziness, faints, tinnitus, trouble swallowing, difficulty breathing, shortness of breath, respiratory pain, visual change, history of cancer, history of trauma and infectionNausea: chronic  Neurological Testing     Sensation   Cervical/Thoracic   Left   Intact: light touch  Paresthesia: light touch    Right   Intact: light touch    Comments   Left light touch: whole palm of hand       Reflexes   Left   Biceps (C5/C6): normal (2+)  Brachioradialis (C6): normal (2+)  Cordero's reflex: negative    Right   Biceps (C5/C6): normal (2+)  Brachioradialis (C6): normal (2+)  Cordero's reflex: negative    Active Range of Motion   Cervical/Thoracic Spine       Cervical  Subcranial protraction:   Restriction level: minimal  Subcranial retraction:   Restriction level: minimal  Flexion:  Restriction level: moderate  Extension:  Restriction level: minimal  Left lateral flexion:  Restriction level: minimal  Right lateral flexion:  Restriction level minimal  Left rotation:  Restriction level: minimal  Right rotation:  Restriction level: minimal  Left Shoulder   Flexion: WFL  Extension: 40 degrees   Abduction: WFL    Right Shoulder   Flexion: WFL  Extension: 62 degrees   Abduction: WFL  Mechanical Assessment    Cervical    Seated Protrusion: repeated movements   Pain location: no change  Seated retraction: repeated movements   Pain location: no change  Seated Flexion:  repeated movements  Pain location: peripheralized  Seated Extension: repeated movements  Pain location: no change  Seated left rotation:   Pain location: no change  Seated right rotation:   Pain location: no change    Thoracic    Seated extension: repeated movements  Pain location: no change    Lumbar      Strength/Myotome Testing   Cervical Spine     Left   Interossei strength (t1): 4    Right   Interossei strength (t1): 4    Left Shoulder     Planes of Motion   Flexion: 4   Abduction: 4-   External rotation at 0°: 4     Right Shoulder     Planes of Motion   Flexion: 5   Abduction: 5   External rotation at 0°: 5     Left Elbow   Flexion: 5  Extension: 5    Right Elbow   Flexion: 5  Extension: 5    Left Wrist/Hand   Wrist extension: 4  Wrist flexion: 4+  Thumb extension: 4    Right Wrist/Hand   Wrist extension: 4  Wrist flexion: 4+  Thumb extension: 4+    Tests   Cervical   Negative repeated extension and repeated flexion       Additional Tests Details  FOTO: 70% ( predicted 72%)    Neuro Exam:     Headaches   Patient reports headaches: Yes (low grade daily; migraine:2-3x/month)                  Precautions: HX MIGRAINES/depression  RE: 6/15    Manual   5/18       Massage B/L upper trapezius         Manual C-spine Traction        Sub Occipital Release        Seated chin tuck with clinician O P                    Therapeutic Exercise 5/18       UBE stand ALT        Nu-step   S=9                Bridges        Corner Pec stretch                        Cervical snag extension and rotation                        Levator Scapulae stretch B/L          B/L Upper Trap stretch        Sit T-spine Extensions x10 no change       Supine Pectoralis Minor stretch        3 way prayer stretch        Neuro Re-Ed        Abdominal Hollow        Kegels        Chin Tucks x10 no change       Chin tuck lifts  Test endurance      Quadruped chin tuck + rotation        Quad DLS        MTP/LTP/ antirotation        Chin tuck + cervical EXT x10 no change       Posture correction        Prone cervical extension  * TEST      Therapeutic Activity        Crate lifts        Chair squats        Crate carry            Modalities        MHP/CP UT

## 2022-05-18 ENCOUNTER — EVALUATION (OUTPATIENT)
Dept: PHYSICAL THERAPY | Facility: CLINIC | Age: 47
End: 2022-05-18
Payer: COMMERCIAL

## 2022-05-18 DIAGNOSIS — G54.0 NEUROPATHY OF LEFT BRACHIAL PLEXUS: Primary | ICD-10-CM

## 2022-05-18 PROCEDURE — 97162 PT EVAL MOD COMPLEX 30 MIN: CPT | Performed by: PHYSICAL THERAPIST

## 2022-05-18 PROCEDURE — 97112 NEUROMUSCULAR REEDUCATION: CPT | Performed by: PHYSICAL THERAPIST

## 2022-05-20 ENCOUNTER — TELEPHONE (OUTPATIENT)
Dept: NEUROLOGY | Facility: CLINIC | Age: 47
End: 2022-05-20

## 2022-05-20 NOTE — TELEPHONE ENCOUNTER
matthieu from [facility] [employer] called and states that [facility] needs PA and they faxed PA request    [employer]  [id number]  No pcn  [id number]  [phone]       will need to complete PA

## 2022-05-22 NOTE — PROGRESS NOTES
Daily Note     Today's date: 2022  Patient name: Doreen Avitia  : 1975  MRN: [id number]  Referring provider: Hayley Bowling PA-C  Dx:   Encounter Diagnosis     ICD-10-CM    1  Neuropathy of left brachial plexus  G54 0                   Subjective: ***      Objective: See treatment diary below      Assessment: Tolerated treatment {Tolerated treatment :}   Patient {assessment:}      Plan: {PLAN:[id number]}       Precautions: HX MIGRAINES/depression  RE: 6/15    Manual          Massage B/L upper trapezius         Manual C-spine Traction        Sub Occipital Release                            Therapeutic Exercise        UBE stand ALT        Nu-step   S=9                Bridges        Corner Pec stretch                        Cervical snag extension and rotation  *      Cervical retractions w/ self O P  upper cervical flexion  *              Levator Scapulae stretch B/L          B/L Upper Trap stretch        Sit T-spine Extensions x10 no change       Supine Pectoralis Minor stretch                Neuro Re-Ed        Abdominal Hollow        Kegels        Chin Tucks x10 no change       Chin tuck lifts  Test endurance      Quadruped chin tuck + rotation        Open book rotations  *      Quadruped thoracic rotation upper and lower        MTP/LTP/ antirotation        Chin tuck + cervical EXT x10 no change       Posture correction        Prone cervical extension  * TEST      Therapeutic Activity        Crate lifts        Chair squats        Crate carry            Modalities        MHP/CP UT

## 2022-05-23 ENCOUNTER — APPOINTMENT (OUTPATIENT)
Dept: PHYSICAL THERAPY | Facility: CLINIC | Age: 47
End: 2022-05-23
Payer: COMMERCIAL

## 2022-06-23 ENCOUNTER — PROCEDURE VISIT (OUTPATIENT)
Dept: NEUROLOGY | Facility: CLINIC | Age: 47
End: 2022-06-23
Payer: COMMERCIAL

## 2022-06-23 VITALS — TEMPERATURE: 97.6 F | DIASTOLIC BLOOD PRESSURE: 80 MMHG | HEART RATE: 74 BPM | SYSTOLIC BLOOD PRESSURE: 126 MMHG

## 2022-06-23 DIAGNOSIS — G43.709 CHRONIC MIGRAINE WITHOUT AURA WITHOUT STATUS MIGRAINOSUS, NOT INTRACTABLE: Primary | ICD-10-CM

## 2022-06-23 PROCEDURE — 64615 CHEMODENERV MUSC MIGRAINE: CPT | Performed by: PHYSICIAN ASSISTANT

## 2022-06-23 NOTE — PROGRESS NOTES
Universal Protocol   Consent: Verbal consent obtained  Written consent obtained  Risks and benefits: risks, benefits and alternatives were discussed  Consent given by: patient  Time out: Immediately prior to procedure a "time out" was called to verify the correct patient, procedure, equipment, support staff and site/side marked as required  Patient understanding: patient states understanding of the procedure being performed  Patient consent: the patient's understanding of the procedure matches consent given  Procedure consent: procedure consent matches procedure scheduled  Relevant documents: relevant documents present and verified  Patient identity confirmed: verbally with patient        Chemodenervation     Date/Time 6/23/2022 2:42 PM     Performed by  Madelin Chandler PA-C     Authorized by Madelin Chandler PA-C        Pre-procedure details      Prepped With: Alcohol     Anesthesia  (see MAR for exact dosages):      Anesthesia method:  None   Procedure details     Position:  Upright   Botox     Botox Type:  Type A    Brand:  Botox    mL's of Botulinum Toxin:  200    Final Concentration per CC:  50 units    Needle Gauge:  30 G 2 5 inch   Procedures     Botox Procedures: chronic headache      Indications: migraines     Injection Location      Head / Face:  L superior cervical paraspinal, R superior cervical paraspinal, L , R , L frontalis, R frontalis, L medial occipitalis, R medial occipitalis, procerus, R temporalis, L temporalis, R superior trapezius and L superior trapezius    L  injection amount:  5 unit(s)    R  injection amount:  5 unit(s)    L lateral frontalis:  5 unit(s)    R lateral frontalis:  5 unit(s)    L medial frontalis:  5 unit(s)    R medial frontalis:  5 unit(s)    L temporalis injection amount:  20 unit(s)    R temporalis injection amount:  20 unit(s)    Procerus injection amount:  5 unit(s)    L medial occipitalis injection amount:  15 unit(s)    R medial occipitalis injection amount:  15 unit(s)    L superior cervical paraspinal injection amount:  10 unit(s)    R superior cervical paraspinal injection amount:  10 unit(s)    L superior trapezius injection amount:  15 unit(s)    R superior trapezius injection amount:  15 unit(s)   Total Units     Total units used:  200    Total units discarded:  0   Post-procedure details      Chemodenervation:  Chronic migraine    Facial Nerve Location[de-identified]  Bilateral facial nerve    Patient tolerance of procedure:   Tolerated well, no immediate complications   Comments       5 units orbicularis oculi bilaterally  15 units trapezius  5 units semisplenalis capitis bilaterally  10 units frontalis  All medically necessary

## 2022-08-02 ENCOUNTER — HOSPITAL ENCOUNTER (OUTPATIENT)
Dept: RADIOLOGY | Age: 47
Discharge: HOME | End: 2022-08-02
Payer: COMMERCIAL

## 2022-08-02 DIAGNOSIS — M54.2 CERVICALGIA: ICD-10-CM

## 2022-08-02 DIAGNOSIS — M50.922 UNSPECIFIED CERVICAL DISC DISORDER AT C5-C6 LEVEL: ICD-10-CM

## 2022-08-02 PROCEDURE — 72141 MRI NECK SPINE W/O DYE: CPT

## 2022-08-02 PROCEDURE — G1004 CDSM NDSC: HCPCS

## 2022-09-22 ENCOUNTER — PROCEDURE VISIT (OUTPATIENT)
Dept: NEUROLOGY | Facility: CLINIC | Age: 47
End: 2022-09-22
Payer: COMMERCIAL

## 2022-09-22 VITALS — HEART RATE: 73 BPM | DIASTOLIC BLOOD PRESSURE: 88 MMHG | TEMPERATURE: 97.8 F | SYSTOLIC BLOOD PRESSURE: 126 MMHG

## 2022-09-22 DIAGNOSIS — G43.709 CHRONIC MIGRAINE WITHOUT AURA WITHOUT STATUS MIGRAINOSUS, NOT INTRACTABLE: Primary | ICD-10-CM

## 2022-09-22 PROCEDURE — 64615 CHEMODENERV MUSC MIGRAINE: CPT | Performed by: PHYSICIAN ASSISTANT

## 2022-09-22 NOTE — PROGRESS NOTES
Universal Protocol   Consent: Verbal consent obtained  Written consent obtained  Risks and benefits: risks, benefits and alternatives were discussed  Consent given by: patient  Time out: Immediately prior to procedure a "time out" was called to verify the correct patient, procedure, equipment, support staff and site/side marked as required  Patient understanding: patient states understanding of the procedure being performed  Patient consent: the patient's understanding of the procedure matches consent given  Procedure consent: procedure consent matches procedure scheduled  Relevant documents: relevant documents present and verified  Patient identity confirmed: verbally with patient        Chemodenervation     Date/Time 9/22/2022 2:32 PM     Performed by  Cesar Segal PA-C     Authorized by Cesar Segal PA-C        Pre-procedure details      Prepped With: Alcohol     Anesthesia  (see MAR for exact dosages):      Anesthesia method:  None   Procedure details     Position:  Upright   Botox     Botox Type:  Type A    Brand:  Botox    mL's of Botulinum Toxin:  200    Final Concentration per CC:  50 units    Needle Gauge:  30 G 2 5 inch   Procedures     Botox Procedures: chronic headache      Indications: migraines     Injection Location      Head / Face:  L superior cervical paraspinal, R superior cervical paraspinal, L , R , L frontalis, R frontalis, L medial occipitalis, R medial occipitalis, procerus, R temporalis, L temporalis, R superior trapezius and L superior trapezius    L  injection amount:  5 unit(s)    R  injection amount:  5 unit(s)    L lateral frontalis:  5 unit(s)    R lateral frontalis:  5 unit(s)    L medial frontalis:  5 unit(s)    R medial frontalis:  5 unit(s)    L temporalis injection amount:  20 unit(s)    R temporalis injection amount:  20 unit(s)    Procerus injection amount:  5 unit(s)    L medial occipitalis injection amount:  15 unit(s)    R medial occipitalis injection amount:  15 unit(s)    L superior cervical paraspinal injection amount:  10 unit(s)    R superior cervical paraspinal injection amount:  10 unit(s)    L superior trapezius injection amount:  15 unit(s)    R superior trapezius injection amount:  15 unit(s)   Total Units     Total units used:  200    Total units discarded:  0   Post-procedure details      Chemodenervation:  Chronic migraine    Facial Nerve Location[de-identified]  Bilateral facial nerve    Patient tolerance of procedure:   Tolerated well, no immediate complications   Comments      5 units orbicularis oculi bilaterally  15 units trapezius  5 units semisplenalis capitis bilaterally  10 units frontalis  All medically necessary

## 2022-12-22 ENCOUNTER — PROCEDURE VISIT (OUTPATIENT)
Dept: NEUROLOGY | Facility: CLINIC | Age: 47
End: 2022-12-22

## 2022-12-22 VITALS — SYSTOLIC BLOOD PRESSURE: 135 MMHG | TEMPERATURE: 96.2 F | HEART RATE: 96 BPM | DIASTOLIC BLOOD PRESSURE: 83 MMHG

## 2022-12-22 DIAGNOSIS — G43.709 CHRONIC MIGRAINE WITHOUT AURA WITHOUT STATUS MIGRAINOSUS, NOT INTRACTABLE: Primary | ICD-10-CM

## 2022-12-22 NOTE — PROGRESS NOTES
Universal Protocol   Consent: Verbal consent obtained  Written consent obtained  Risks and benefits: risks, benefits and alternatives were discussed  Consent given by: patient  Time out: Immediately prior to procedure a "time out" was called to verify the correct patient, procedure, equipment, support staff and site/side marked as required  Patient understanding: patient states understanding of the procedure being performed  Patient consent: the patient's understanding of the procedure matches consent given  Procedure consent: procedure consent matches procedure scheduled  Relevant documents: relevant documents present and verified  Patient identity confirmed: verbally with patient        Chemodenervation     Date/Time 12/22/2022 2:39 PM     Performed by  Nicholas Lizama PA-C     Authorized by Nicholas Lizama PA-C        Pre-procedure details      Prepped With: Alcohol     Anesthesia  (see MAR for exact dosages):      Anesthesia method:  None   Procedure details     Position:  Upright   Botox     Botox Type:  Type A    Brand:  Botox    mL's of Botulinum Toxin:  200    Final Concentration per CC:  50 units    Needle Gauge:  30 G 2 5 inch   Procedures     Botox Procedures: chronic headache      Indications: migraines     Injection Location      Head / Face:  L superior cervical paraspinal, R superior cervical paraspinal, L , R , L frontalis, R frontalis, L medial occipitalis, R medial occipitalis, procerus, R temporalis, L temporalis, R superior trapezius and L superior trapezius    L  injection amount:  5 unit(s)    R  injection amount:  5 unit(s)    L lateral frontalis:  5 unit(s)    R lateral frontalis:  5 unit(s)    L medial frontalis:  5 unit(s)    R medial frontalis:  5 unit(s)    L temporalis injection amount:  20 unit(s)    R temporalis injection amount:  20 unit(s)    Procerus injection amount:  5 unit(s)    L medial occipitalis injection amount:  15 unit(s)    R medial occipitalis injection amount:  15 unit(s)    L superior cervical paraspinal injection amount:  10 unit(s)    R superior cervical paraspinal injection amount:  10 unit(s)    L superior trapezius injection amount:  15 unit(s)    R superior trapezius injection amount:  15 unit(s)   Total Units     Total units used:  200    Total units discarded:  0   Post-procedure details      Chemodenervation:  Chronic migraine    Facial Nerve Location[de-identified]  Bilateral facial nerve    Patient tolerance of procedure:   Tolerated well, no immediate complications   Comments      5 units orbicularis oculi bilaterally  15 units trapezius  5 units semisplenalis capitis bilaterally  10 units frontalis  All medically necessary

## 2023-01-04 ENCOUNTER — TELEMEDICINE (OUTPATIENT)
Dept: NEUROLOGY | Facility: CLINIC | Age: 48
End: 2023-01-04

## 2023-01-04 DIAGNOSIS — G47.50 PARASOMNIA, UNSPECIFIED TYPE: ICD-10-CM

## 2023-01-04 DIAGNOSIS — F51.4 ADULT NIGHT TERROR: ICD-10-CM

## 2023-01-04 DIAGNOSIS — G43.709 CHRONIC MIGRAINE WITHOUT AURA WITHOUT STATUS MIGRAINOSUS, NOT INTRACTABLE: Primary | ICD-10-CM

## 2023-01-04 NOTE — PATIENT INSTRUCTIONS
Preventive therapy for headaches:   Continue Botox every 3 months  Nurtec 75 mg every other day  Abortive therapy for headaches: At the onset of a severe headache take Nurtec 75 mg if have not already taken that day  Limit of 1 in 24 hours  If that fails, use ibuprofen and Benadryl 50 mg     I am placing a referral to the sleep medicine specialist team to further evaluate your sleep issues  Please call [phone] to schedule and I recommend you see one of the following providers:    Oumou Cavanaugh PA-C    Neck pain:   - We discussed the role of neck pathology and poor posture, with straightening of the normal cervical lordosis, in headaches  We discussed how tightening of the neck muscles can irritate the nerves in the occipital region of her head and cause or worsen head pain  We also discussed and demonstrated neck strengthening and relaxation exercises, as well as giving written instructions on these exercises  - We talked about the importance of good posture for improving shoulder, neck, and head pain  The patient was given visualization exercises for correcting posture, which patient will practice at home  If this simple exercise does not help improve the posture, we will consider formal physical therapy in the future  Medication overuse headaches:   - We discussed medication overuse headache [facility]) and how to avoid it in the future  It was explained that all analgesics have the potential to cause medication overuse headache [facility]) and analgesic overuse can negate the effectiveness of headache preventive measures  After successful [address] treatment, preventive medications for an underlying primary headache disorder have a greater chance for success   Avoid medications with narcotics, barbiturates, or caffeine in them as these can cause rebound headaches after very few doses and can interfere with other headache medicine efficacy  Taking any analgesics for more than 2-3 days a week can cause medication overuse headache  Reproductive age women: Should take folic acid daily when taking anti-seizure drugs especially Depakote  [place] Prescription Drug Monitoring Program report was reviewed and was appropriate      Headache management instructions  - When patient has a moderate to severe headache, they should seek rest, initiate relaxation and apply cold compresses to the head  - Maintain regular sleep schedule  Adults need at least 7-8 hours of uninterrupted a night  - Limit over the counter medications such as Tylenol, Ibuprofen, Aleve, Excedrin  (No more than 3 times a week)  - Maintain headache diary  We discussed an NOEL for a smart phone is "Migraine juan ramon"  - Limit caffeine to 1-2 cups 8 to 16 oz a day or less  - Avoid dietary trigger  (aged cheese, peanuts, MSG, aspartame and nitrates)  - Patient is to have regular frequent meals to prevent headache onset  - Please drink at least 64 ounces of water a day to help remain hydrated  Please call with any questions or concerns   Office number is [phone]

## 2023-01-04 NOTE — PROGRESS NOTES
Virtual Regular Visit    Verification of patient location:    Patient is located in the following state in which I hold an active license [place]      Assessment/Plan:    Problem List Items Addressed This Visit        Cardiovascular and Mediastinum    Chronic migraine without aura without status migrainosus, not intractable - Primary     Preventive therapy for headaches:   Continue Botox every 3 months  Nurtec 75 mg every other day  Abortive therapy for headaches: At the onset of a severe headache take Nurtec 75 mg if have not already taken that day  Limit of 1 in 24 hours  If that fails, use ibuprofen and Benadryl 50 mg  Other    Adult night terror     Referral to sleep medicine as this started in her 25s and happens when she ever gets into a deep sleep  Usually is waking up 3-5 times a night from her pets as well as night terrors if she sleeps deeply  If she takes melatonin this can happen as well  Wakes her  up screaming in her sleep  Has never been evaluated  No history of trauma         Relevant Orders    Ambulatory Referral to Sleep Medicine            Reason for visit is   Chief Complaint   Patient presents with   • Migraine   • Virtual Regular Visit        Encounter provider Meliza Burleson PA-C    Provider located at [address] [place] [address]  [phone]      Recent Visits  No visits were found meeting these conditions  Showing recent visits within past 7 days and meeting all other requirements  Today's Visits  Date Type Provider Dept   01/04/23 Telemedicine Meliza Burleson PA-C  Neuro [facility]   Showing today's visits and meeting all other requirements  Future Appointments  No visits were found meeting these conditions  Showing future appointments within next 150 days and meeting all other requirements       The patient was identified by name and date of birth   Maredis Lucie anjali informed that this is a telemedicine visit and that the visit is being conducted through the [employer]  She agrees to proceed     My office door was closed  No one else was in the room  She acknowledged consent and understanding of privacy and security of the video platform  The patient has agreed to participate and understands they can discontinue the visit at any time  Patient is aware this is a billable service  Edmond Nelson is a 56 y o  right handed female She works at [employer] in [employer]           Patient states that she is having more neck and shoulder pain  This has been worsening over the past 3-6 months  Now has the pins and needles on her left side to her shoulder and fingers  DId have prior injections and treatment with pain management, last seen in 2020  Has been doing home PT and stretching exercises without any relief     Throbbing pain in arm pit (worse) and then posterior arm to elbow (annoying)  Does get tingling in her fingertips (unsure if all or some), this happens more if done a lot of lifting  States she does have intermittent weakness in her left arm        Chronic migraine headaches:   What medications do you take or have you taken for your headaches?    Current Preventative:  Baclofen, Robaxin  Botox  Nurtec     Current Abortive:  Decadron  Nurtec     Prior PREVENTATIVE:  melatonin, multivitamin, B12, biotin, vitamin-D  Depakote, gabapentin  amitriptyline (gain weight on it) venlafaxine, cymbalta  Robaxin, tizanidine     Prior ABORTIVE:  methylprednisolone,   ketorolac, diclofenac  Fioricet (stopped in 2015)  Depakote, Gabapentin (feels like been out drinking), Olanzapine  prochlorperazine  Excedrin, Aleve, ibuprofen  DHE  Sumatriptan, Rizatriptan     Headache are worse if the patient: cough, sneeze, bending over  Headache triggers:  Not sure     Aura/Warning and how long does it last - blurry vision - within half an hour     What is your current pain level - 0/10     How often do the headaches occur?   mild headache: 1-2 a week; prior to Botox was daily  Moderate/Severe headaches 2-4 a month; prior to Botox was daily     What time of the day do the headaches start?   mild pain: varies  Severe headache: varies     How long do the headaches last?   mild headache:  6-8 hours  Moderate/Severe headache: a few hours to rest of the days; prior 1-2 days      Are you ever headache free? yes     Describe your usual headache -   mild pain: Throbbing  Severe pain: Stabbing     Where is your headache located?   mild headache: anywhere in the head  Severe headache: right frontal and parietal region     What is the intensity of pain?   mild headaches:  3-10  Moderate/severe headaches: 7-8/10      Associated symptoms:   Decrease of appetite, nausea, vomiting, diarrhea  Insomnia  Photophobia, phonophobia, sensitivity to smell   Problem with concentration  Blurred vision,   Pupil dilated on the right  Hands or feet tingle or feel numb  flushing of face  Lacrimation, runny or stuffed-up nose,   light-headed or dizzy, stiff or sore neck  prefer to be alone and in a dark room, unable to work     Number of days missed per month because of headaches:  Work (or school) days: none  Social or Family activities:  none, was often before Botox     What time of the year do headaches occur more frequently? Not sure  Have you seen someone else for headaches or pain? Yes  Have you had trigger point injection performed and how often? No  Have you had Botox injection performed and how often? Yes  Have you had epidural injections or transforaminal injections performed? Yes     EM2018:   This is an abnormal study  There is electrophysiological evidence consistent with an active subacute to chronic left C5/C6 radiculopathy     There was no electrophysiological evidence of median or ulnar entrapment neuropathy, or brachial plexopathy in the left upper extremity      Sleep Habit  Is your sleep restful? yes  How often do you get up at night? 3-5 from her animals   Do you wake up with headaches? yes sometimes  Do you snore while asleep? no  Have you been told that you stop breathing during sleeping?    no  Do you wake up tired in the morning? no  Do you take frequent naps during the day? no  Do you have jaw pain? yes  Do you grind/clench your teeth at night? yes  Do you have restless leg syndrome? no  Do you have nightmare or sleep walk? Yes, has significant sleep terrors which started in her 25s  Wakes her  up screaming while she is still asleep  When she takes anything to sleep, even melatonin this happens   She otherwise wakes up 3-5 times a night     Alternative therapies used in the past for headaches? physical therapy  Have you used CBD or THC for your headaches and how often? No  Are you current pregnant or planning on getting pregnant? No  Have you ever had any Brain imaging? yes        I personally reviewed these images      With botox has had a reduction of at least 7 migraine days with less abortive medication, less ER visits which correlates to headache diary     Reviewed old notes from physician seen in the past- see above HPI for summary of previous encounters             Past Medical History:   Diagnosis Date   • Anxiety    • Cluster headache 9/2018    Severe mainly behind right eye   • Depression    • Headache, tension-type 2015    Increasing worse since 9/2018   • Migraine        Past Surgical History:   Procedure Laterality Date   • TUBAL LIGATION         Current Outpatient Medications   Medication Sig Dispense Refill   • Botulinum Toxin Type A 200 units SOLR Inject up to 200 units IM by physician into the head and neck muscles every three months 200 Units 4   • famotidine (PEPCID) 40 MG tablet Take 40 mg by mouth daily     • methocarbamol (Robaxin-750) 750 mg tablet Take 1 tablet (750 mg total) by mouth every 8 (eight) hours as needed for muscle spasms 30 tablet 0   • Nurtec 75 MG TBDP TAKE 75 MG/1 TABLET BY MOUTH EVERY OTHER DAY TO PREVENT MIGRAINE HEADACHE  16 tablet 11   • methylPREDNISolone 4 MG tablet therapy pack Use as directed on package (Patient not taking: Reported on 1/4/2023) 1 each 0     No current facility-administered medications for this visit  No Known Allergies   I have reviewed the patient's medical, social and surgical history as well as medications in detail and updated the computerized patient record  Review of Systems   Constitutional: Negative  HENT: Negative  Eyes: Negative  Respiratory: Negative  Cardiovascular: Negative  Gastrointestinal: Negative  Endocrine: Negative  Genitourinary: Negative  Musculoskeletal: Negative  Skin: Negative  Allergic/Immunologic: Negative  Neurological: Positive for headaches  Hematological: Negative  Psychiatric/Behavioral: Positive for sleep disturbance  I personally reviewed and updated the ROS that was entered by the medical assistant      Video Exam    There were no vitals filed for this visit  Physical Exam   CONSTITUTIONAL: Well developed, well nourished, well groomed  No dysmorphic features  Eyes:  EOM normal      Neck:  Normal ROM, neck supple  HEENT:  Normocephalic atraumatic  Chest:  Respirations regular and unlabored  Psychiatric:  Normal behavior and appropriate affect      MENTAL STATUS  Orientation: Alert and oriented x 3  Fund of knowledge: Intact  COORDINATION   Station/Gait: Normal baseline gait      I spent 44 minutes in total time for this visit

## 2023-01-04 NOTE — ASSESSMENT & PLAN NOTE
Referral to sleep medicine as this started in her 25s and happens when she ever gets into a deep sleep  Usually is waking up 3-5 times a night from her pets as well as night terrors if she sleeps deeply  If she takes melatonin this can happen as well  Wakes her  up screaming in her sleep  Has never been evaluated    No history of trauma

## 2023-01-04 NOTE — PROGRESS NOTES
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           Patient states that she is having more neck and shoulder pain  This has been worsening over the past 3-6 months  Now has the pins and needles on her left side to her shoulder and fingers  DId have prior injections and treatment with pain management, last seen in 2020  Has been doing home PT and stretching exercises without any relief     Throbbing pain in arm pit (worse) and then posterior arm to elbow (annoying)  Does get tingling in her fingertips (unsure if all or some), this happens more if done a lot of lifting  States she does have intermittent weakness in her left arm        Chronic migraine headaches:   What medications do you take or have you taken for your headaches?    Current Preventative:  ***Nurtec  Baclofen, Robaxin  Botox     Current Abortive:  Decadron  Nurtec        Prior PREVENTATIVE:  melatonin, multivitamin, B12, biotin, vitamin-D  Depakote, gabapentin  amitriptyline (gain weight on it) venlafaxine, cymbalta  Robaxin, tizanidine     Prior ABORTIVE:  methylprednisolone,   ketorolac, diclofenac  Fioricet (stopped in 2015)  Depakote, Gabapentin (feels like been out drinking), Olanzapine  prochlorperazine  Excedrin, Aleve, ibuprofen  DHE  Sumatriptan, Rizatriptan     Headache are worse if the patient: cough, sneeze, bending over  Headache triggers:  Not sure     Aura/Warning and how long does it last - blurry vision - within half an hour     What is your current pain level - 0/10     How often do the headaches occur?   mild headache: 1-2 a week; prior to Botox was daily  Moderate headaches 1-2 a month; prior to Botox was daily  Severe headache: 1-2 a month; prior to Botox was daily     What time of the day do the headaches start?   mild pain: varies  Severe headache: varies     How long do the headaches last?   mild headache:  a few hours  Severe headache: 1-2 days      Are you ever headache free? yes     Describe your usual headache -   mild pain: Throbbing  Severe pain: Stabbing     Where is your headache located?   mild headache: anywhere in the head  Severe headache: right frontal and parietal region     What is the intensity of pain?   mild headaches:  3-4/10  Moderate headaches: 8/10   Severe headaches: 10/10      Associated symptoms:   • Decrease of appetite, nausea, vomiting, diarrhea  • Insomnia  • Photophobia, phonophobia, sensitivity to smell   • Problem with concentration  • Blurred vision,   • Pupil dilated on the right  • Hands or feet tingle or feel numb  •  flushing of face  • Lacrimation, runny or stuffed-up nose,   • light-headed or dizzy, stiff or sore neck  • prefer to be alone and in a dark room, unable to work     Number of days missed per month because of headaches:  Work (or school) days: none  Social or Family activities:  none, was often before Botox     What time of the year do headaches occur more frequently? Not sure  Have you seen someone else for headaches or pain? Yes  Have you had trigger point injection performed and how often? No  Have you had Botox injection performed and how often? Yes  Have you had epidural injections or transforaminal injections performed? Yes     EM2018:   This is an abnormal study  There is electrophysiological evidence consistent with an active subacute to chronic left C5/C6 radiculopathy     There was no electrophysiological evidence of median or ulnar entrapment neuropathy, or brachial plexopathy in the left upper extremity      Sleep Habit  Is your sleep restful? yes  How often do you get up at night? 0-1  Do you wake up with headaches? yes sometimes  Do you snore while asleep? no  Have you been told that you stop breathing during sleeping?    no  Do you wake up tired in the morning? no  Do you take frequent naps during the day? no  Do you have jaw pain? yes  Do you grind/clench your teeth at night? yes  Do you have restless leg syndrome? no  Do you have nightmare or sleep walk? no     Alternative therapies used in the past for headaches? physical therapy  Have you used CBD or THC for your headaches and how often? No  Are you current pregnant or planning on getting pregnant? No  Have you ever had any Brain imaging? yes        I personally reviewed these images      With botox has had a reduction of at least 7 migraine days with less abortive medication, less ER visits which correlates to headache diary     Reviewed old notes from physician seen in the past- see above HPI for summary of previous encounters

## 2023-03-15 ENCOUNTER — TELEPHONE (OUTPATIENT)
Dept: NEUROLOGY | Facility: CLINIC | Age: 48
End: 2023-03-15

## 2023-03-15 NOTE — TELEPHONE ENCOUNTER
Submitted Re-Authorization request via fax to [facility] for:     Botox-200 units, [id number], [id number]  DX: [phone], Chronic Migraine  Awaiting approval/denial response  Will follow up on the status of pending authorization requested

## 2023-03-17 NOTE — TELEPHONE ENCOUNTER
Received the following authorization approval info via fax from [facility]:    Approved: Vi [place]ite & [id number]  Botox-200 units  Auth# [id number]  Valid: 3/15/2023 until 3/14/2024  4 visits    Please use our Stock

## 2023-03-23 ENCOUNTER — PROCEDURE VISIT (OUTPATIENT)
Dept: NEUROLOGY | Facility: CLINIC | Age: 48
End: 2023-03-23

## 2023-03-23 VITALS — TEMPERATURE: 97.9 F | DIASTOLIC BLOOD PRESSURE: 88 MMHG | SYSTOLIC BLOOD PRESSURE: 130 MMHG | HEART RATE: 97 BPM

## 2023-03-23 DIAGNOSIS — G43.709 CHRONIC MIGRAINE WITHOUT AURA WITHOUT STATUS MIGRAINOSUS, NOT INTRACTABLE: Primary | ICD-10-CM

## 2023-03-23 NOTE — PROGRESS NOTES
Universal Protocol   Consent: Verbal consent obtained  Written consent obtained  Risks and benefits: risks, benefits and alternatives were discussed  Consent given by: patient  Time out: Immediately prior to procedure a "time out" was called to verify the correct patient, procedure, equipment, support staff and site/side marked as required  Patient understanding: patient states understanding of the procedure being performed  Patient consent: the patient's understanding of the procedure matches consent given  Procedure consent: procedure consent matches procedure scheduled  Relevant documents: relevant documents present and verified  Patient identity confirmed: verbally with patient        Chemodenervation     Date/Time 3/23/2023 9:28 AM     Performed by  Rene Kirby PA-C     Authorized by Rene Kirby PA-C        Pre-procedure details      Prepped With: Alcohol     Anesthesia  (see MAR for exact dosages):      Anesthesia method:  None   Procedure details     Position:  Upright   Botox     Botox Type:  Type A    Brand:  Botox    mL's of Botulinum Toxin:  200    Final Concentration per CC:  50 units    Needle Gauge:  30 G 2 5 inch   Procedures     Botox Procedures: chronic headache      Indications: migraines     Injection Location      Head / Face:  L superior cervical paraspinal, R superior cervical paraspinal, L , R , L frontalis, R frontalis, L medial occipitalis, R medial occipitalis, procerus, R temporalis, L temporalis, R superior trapezius and L superior trapezius    L  injection amount:  5 unit(s)    R  injection amount:  5 unit(s)    L lateral frontalis:  5 unit(s)    R lateral frontalis:  5 unit(s)    L medial frontalis:  5 unit(s)    R medial frontalis:  5 unit(s)    L temporalis injection amount:  20 unit(s)    R temporalis injection amount:  20 unit(s)    Procerus injection amount:  5 unit(s)    L medial occipitalis injection amount:  15 unit(s)    R medial occipitalis injection amount:  15 unit(s)    L superior cervical paraspinal injection amount:  10 unit(s)    R superior cervical paraspinal injection amount:  10 unit(s)    L superior trapezius injection amount:  15 unit(s)    R superior trapezius injection amount:  15 unit(s)   Total Units     Total units used:  200    Total units discarded:  0   Post-procedure details      Chemodenervation:  Chronic migraine    Facial Nerve Location[de-identified]  Bilateral facial nerve    Patient tolerance of procedure:   Tolerated well, no immediate complications   Comments      5 units orbicularis oculi bilaterally  15 units trapezius  5 units semisplenalis capitis bilaterally  10 units frontalis  All medically necessary

## 2023-03-24 NOTE — PROGRESS NOTES
Imaging order dated 3/23/23 mailed to patient Universal Protocol   Consent: Verbal consent obtained  Written consent obtained  Risks and benefits: risks, benefits and alternatives were discussed  Consent given by: patient  Time out: Immediately prior to procedure a "time out" was called to verify the correct patient, procedure, equipment, support staff and site/side marked as required  Patient understanding: patient states understanding of the procedure being performed  Patient consent: the patient's understanding of the procedure matches consent given  Procedure consent: procedure consent matches procedure scheduled  Relevant documents: relevant documents present and verified  Patient identity confirmed: verbally with patient        Chemodenervation     Date/Time 3/8/2021 2:27 PM     Performed by  Arlien De Santiago PA-C     Authorized by Arline De Santiago PA-C        Pre-procedure details      Prepped With: Alcohol     Anesthesia  (see MAR for exact dosages):      Anesthesia method:  None   Procedure details     Position:  Upright   Botox     Botox Type:  Type A    Brand:  Botox    mL's of Botulinum Toxin:  200    Final Concentration per CC:  50 units    Needle Gauge:  30 G 2 5 inch   Procedures     Botox Procedures: chronic headache      Indications: migraines     Injection Location      Head / Face:  L superior cervical paraspinal, R superior cervical paraspinal, L , R , L frontalis, R frontalis, L medial occipitalis, R medial occipitalis, procerus, R temporalis, L temporalis, R superior trapezius and L superior trapezius    L  injection amount:  5 unit(s)    R  injection amount:  5 unit(s)    L lateral frontalis:  5 unit(s)    R lateral frontalis:  5 unit(s)    L medial frontalis:  5 unit(s)    R medial frontalis:  5 unit(s)    L temporalis injection amount:  20 unit(s)    R temporalis injection amount:  20 unit(s)    Procerus injection amount:  5 unit(s)    L medial occipitalis injection amount:  15 unit(s)    R medial occipitalis injection amount:  15 unit(s)    L superior cervical paraspinal injection amount:  10 unit(s)    R superior cervical paraspinal injection amount:  10 unit(s)    L superior trapezius injection amount:  15 unit(s)    R superior trapezius injection amount:  15 unit(s)   Total Units     Total units used:  200    Total units discarded:  0   Post-procedure details      Chemodenervation:  Chronic migraine    Facial Nerve Location[de-identified]  Bilateral facial nerve    Patient tolerance of procedure:   Tolerated well, no immediate complications   Comments       5 units orbicularis oculi bilaterally  15 units trapezius  5 units semisplenalis capitis bilaterally  10 units frontalis  All medically necessary

## 2023-05-15 ENCOUNTER — HOSPITAL ENCOUNTER (OUTPATIENT)
Dept: SLEEP CENTER | Facility: CLINIC | Age: 48
Discharge: HOME | End: 2023-05-15

## 2023-05-15 DIAGNOSIS — G47.50 PARASOMNIA, UNSPECIFIED TYPE: ICD-10-CM

## 2023-05-16 NOTE — PROGRESS NOTES
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 12PM   Caffeine use:NO    Alcohol:Dayshift workers after 5PM: Alcohol use:NO    Typical day for patient:YES       Study Documentation    Sleep Study Indications: Parasomnia  Sleep Study: Diagnostic   Snore:None  Supplemental O2: no      Minimum SaO2 93%  Baseline SaO2 95%            EKG abnormalities: no     EEG abnormalities: no    Sleep Study Recorded < 2 hours: N/A    Sleep Study Recorded > 2 hours but incomplete study: N/A    Sleep Study Recorded 6 hours but no sleep obtained: NO    Patient classification: employed       Post-Sleep Study    Medication used at bedtime or during sleep study:NO    Patient reports time it took to fall asleep:20 to 30 minutes    Patient reports waking up during study:3 or more times  Patient reports returning to sleep in 10 to 30 minutes  Patient reports sleeping 4 to 6 hours without dreaming  Patient reports sleep during study:typical    Patient rated sleepiness: Somewhat sleepy or tired    PAP treatment:no

## 2023-05-25 DIAGNOSIS — G47.61 PLMD (PERIODIC LIMB MOVEMENT DISORDER): ICD-10-CM

## 2023-05-25 DIAGNOSIS — E61.1 IRON DEFICIENCY: Primary | ICD-10-CM

## 2023-05-30 ENCOUNTER — TELEPHONE (OUTPATIENT)
Dept: SLEEP CENTER | Facility: CLINIC | Age: 48
End: 2023-05-30

## 2023-05-30 NOTE — TELEPHONE ENCOUNTER
----- Message from Tom Kauffman MD sent at 5/25/2023  8:49 AM EDT -----  Test was normal except for PLM, will order iron panel, recommend followup with me

## 2023-05-30 NOTE — TELEPHONE ENCOUNTER
Sleep study results provided to patient by Dr Cherie Delaney via [address]  Called patient to schedule follow up appointment and left call back message

## 2023-05-31 ENCOUNTER — APPOINTMENT (OUTPATIENT)
Dept: LAB | Facility: CLINIC | Age: 48
End: 2023-05-31
Payer: COMMERCIAL

## 2023-05-31 DIAGNOSIS — E61.1 IRON DEFICIENCY: ICD-10-CM

## 2023-05-31 DIAGNOSIS — Z00.8 HEALTH EXAMINATION IN POPULATION SURVEY: ICD-10-CM

## 2023-05-31 DIAGNOSIS — G47.61 PLMD (PERIODIC LIMB MOVEMENT DISORDER): ICD-10-CM

## 2023-05-31 DIAGNOSIS — Z00.00 ROUTINE GENERAL MEDICAL EXAMINATION AT A HEALTH CARE FACILITY: ICD-10-CM

## 2023-05-31 LAB
ALBUMIN SERPL BCP-MCNC: 3.6 G/DL (ref 3.5–5)
ALP SERPL-CCNC: 85 U/L (ref 46–116)
ALT SERPL W P-5'-P-CCNC: 21 U/L (ref 12–78)
ANION GAP SERPL CALCULATED.3IONS-SCNC: 5 MMOL/L (ref 4–13)
AST SERPL W P-5'-P-CCNC: 22 U/L (ref 5–45)
BASOPHILS # BLD AUTO: 0.05 THOUSANDS/ÂΜL (ref 0–0.1)
BASOPHILS NFR BLD AUTO: 1 % (ref 0–1)
BILIRUB SERPL-MCNC: 0.52 MG/DL (ref 0.2–1)
BUN SERPL-MCNC: 14 MG/DL (ref 5–25)
CALCIUM SERPL-MCNC: 9.5 MG/DL (ref 8.3–10.1)
CHLORIDE SERPL-SCNC: 110 MMOL/L (ref 96–108)
CHOLEST SERPL-MCNC: 282 MG/DL
CO2 SERPL-SCNC: 21 MMOL/L (ref 21–32)
CREAT SERPL-MCNC: 0.98 MG/DL (ref 0.6–1.3)
EOSINOPHIL # BLD AUTO: 0.23 THOUSAND/ÂΜL (ref 0–0.61)
EOSINOPHIL NFR BLD AUTO: 3 % (ref 0–6)
ERYTHROCYTE [DISTWIDTH] IN BLOOD BY AUTOMATED COUNT: 13.1 % (ref 11.6–15.1)
EST. AVERAGE GLUCOSE BLD GHB EST-MCNC: 105 MG/DL
FERRITIN SERPL-MCNC: 15 NG/ML (ref 11–307)
GFR SERPL CREATININE-BSD FRML MDRD: 68 ML/MIN/1.73SQ M
GLUCOSE SERPL-MCNC: 102 MG/DL (ref 65–140)
HBA1C MFR BLD: 5.3 %
HCT VFR BLD AUTO: 40.3 % (ref 34.8–46.1)
HDLC SERPL-MCNC: 48 MG/DL
HGB BLD-MCNC: 12.9 G/DL (ref 11.5–15.4)
IMM GRANULOCYTES # BLD AUTO: 0.02 THOUSAND/UL (ref 0–0.2)
IMM GRANULOCYTES NFR BLD AUTO: 0 % (ref 0–2)
IRON SATN MFR SERPL: 23 % (ref 15–50)
IRON SERPL-MCNC: 80 UG/DL (ref 50–170)
LDLC SERPL CALC-MCNC: 174 MG/DL (ref 0–100)
LYMPHOCYTES # BLD AUTO: 2.88 THOUSANDS/ÂΜL (ref 0.6–4.47)
LYMPHOCYTES NFR BLD AUTO: 40 % (ref 14–44)
MCH RBC QN AUTO: 29.3 PG (ref 26.8–34.3)
MCHC RBC AUTO-ENTMCNC: 32 G/DL (ref 31.4–37.4)
MCV RBC AUTO: 92 FL (ref 82–98)
MONOCYTES # BLD AUTO: 0.57 THOUSAND/ÂΜL (ref 0.17–1.22)
MONOCYTES NFR BLD AUTO: 8 % (ref 4–12)
NEUTROPHILS # BLD AUTO: 3.38 THOUSANDS/ÂΜL (ref 1.85–7.62)
NEUTS SEG NFR BLD AUTO: 48 % (ref 43–75)
NONHDLC SERPL-MCNC: 234 MG/DL
NRBC BLD AUTO-RTO: 0 /100 WBCS
PLATELET # BLD AUTO: 321 THOUSANDS/UL (ref 149–390)
PMV BLD AUTO: 11.1 FL (ref 8.9–12.7)
POTASSIUM SERPL-SCNC: 4.8 MMOL/L (ref 3.5–5.3)
PROT SERPL-MCNC: 7.5 G/DL (ref 6.4–8.4)
RBC # BLD AUTO: 4.4 MILLION/UL (ref 3.81–5.12)
SODIUM SERPL-SCNC: 136 MMOL/L (ref 135–147)
TIBC SERPL-MCNC: 355 UG/DL (ref 250–450)
TRIGL SERPL-MCNC: 298 MG/DL
TSH SERPL DL<=0.05 MIU/L-ACNC: 3.35 UIU/ML (ref 0.45–4.5)
WBC # BLD AUTO: 7.13 THOUSAND/UL (ref 4.31–10.16)

## 2023-05-31 PROCEDURE — 83540 ASSAY OF IRON: CPT

## 2023-05-31 PROCEDURE — 82728 ASSAY OF FERRITIN: CPT

## 2023-05-31 PROCEDURE — 83036 HEMOGLOBIN GLYCOSYLATED A1C: CPT

## 2023-05-31 PROCEDURE — 84443 ASSAY THYROID STIM HORMONE: CPT

## 2023-05-31 PROCEDURE — 80053 COMPREHEN METABOLIC PANEL: CPT

## 2023-05-31 PROCEDURE — 36415 COLL VENOUS BLD VENIPUNCTURE: CPT

## 2023-05-31 PROCEDURE — 80061 LIPID PANEL: CPT

## 2023-05-31 PROCEDURE — 83550 IRON BINDING TEST: CPT

## 2023-05-31 PROCEDURE — 85025 COMPLETE CBC W/AUTO DIFF WBC: CPT

## 2023-06-06 DIAGNOSIS — E61.1 IRON DEFICIENCY: Primary | ICD-10-CM

## 2023-06-06 RX ORDER — IRON,CARBONYL/ASCORBIC ACID 65MG-125MG
TABLET, DELAYED RELEASE (ENTERIC COATED) ORAL
Qty: 30 TABLET | Refills: 2 | Status: SHIPPED | OUTPATIENT
Start: 2023-06-06

## 2023-06-12 DIAGNOSIS — E61.1 IRON DEFICIENCY: ICD-10-CM

## 2023-06-13 RX ORDER — IRON,CARBONYL/ASCORBIC ACID 65MG-125MG
TABLET, DELAYED RELEASE (ENTERIC COATED) ORAL
Qty: 60 TABLET | Refills: 2 | OUTPATIENT
Start: 2023-06-13

## 2023-06-13 NOTE — TELEPHONE ENCOUNTER
I received a notice from the pharmacy that Vitron-C is not covered as iron is OTC, an alternate was requested  As far as I know all iron preparations are over the counter so there is no alternative   I LM with the patient to discuss

## 2023-06-22 ENCOUNTER — PROCEDURE VISIT (OUTPATIENT)
Dept: NEUROLOGY | Facility: CLINIC | Age: 48
End: 2023-06-22
Payer: COMMERCIAL

## 2023-06-22 VITALS
DIASTOLIC BLOOD PRESSURE: 90 MMHG | SYSTOLIC BLOOD PRESSURE: 137 MMHG | WEIGHT: 214 LBS | HEIGHT: 65 IN | HEART RATE: 85 BPM | BODY MASS INDEX: 35.65 KG/M2 | TEMPERATURE: 97.6 F

## 2023-06-22 DIAGNOSIS — G43.709 CHRONIC MIGRAINE WITHOUT AURA WITHOUT STATUS MIGRAINOSUS, NOT INTRACTABLE: Primary | ICD-10-CM

## 2023-06-22 PROCEDURE — 64615 CHEMODENERV MUSC MIGRAINE: CPT | Performed by: PHYSICIAN ASSISTANT

## 2023-06-22 NOTE — PROGRESS NOTES
"  Universal Protocol   Consent: Verbal consent obtained  Written consent obtained  Risks and benefits: risks, benefits and alternatives were discussed  Consent given by: patient  Time out: Immediately prior to procedure a \"time out\" was called to verify the correct patient, procedure, equipment, support staff and site/side marked as required  Patient understanding: patient states understanding of the procedure being performed  Patient consent: the patient's understanding of the procedure matches consent given  Procedure consent: procedure consent matches procedure scheduled  Relevant documents: relevant documents present and verified  Patient identity confirmed: verbally with patient        Chemodenervation     Date/Time 6/22/2023 8:30 AM     Performed by  Kaushal Hunter PA-C   Authorized by Kaushal Hunter PA-C       Pre-procedure details      Prepped With: Alcohol     Anesthesia  (see MAR for exact dosages):      Anesthesia method:  None   Procedure details     Position:  Upright   Botox     Botox Type:  Type A    Brand:  Botox    mL's of Botulinum Toxin:  200    Final Concentration per CC:  50 units    Needle Gauge:  30 G 2 5 inch   Procedures     Botox Procedures: chronic headache      Indications: migraines     Injection Location      Head / Face:  L superior cervical paraspinal, R superior cervical paraspinal, L , R , L frontalis, R frontalis, L medial occipitalis, R medial occipitalis, procerus, R temporalis, L temporalis, R superior trapezius and L superior trapezius    L  injection amount:  5 unit(s)    R  injection amount:  5 unit(s)    L lateral frontalis:  5 unit(s)    R lateral frontalis:  5 unit(s)    L medial frontalis:  5 unit(s)    R medial frontalis:  5 unit(s)    L temporalis injection amount:  20 unit(s)    R temporalis injection amount:  20 unit(s)    Procerus injection amount:  5 unit(s)    L medial occipitalis injection amount:  15 unit(s)    R medial " occipitalis injection amount:  15 unit(s)    L superior cervical paraspinal injection amount:  10 unit(s)    R superior cervical paraspinal injection amount:  10 unit(s)    L superior trapezius injection amount:  15 unit(s)    R superior trapezius injection amount:  15 unit(s)   Total Units     Total units used:  200    Total units discarded:  0   Post-procedure details      Chemodenervation:  Chronic migraine    Facial Nerve Location[de-identified]  Bilateral facial nerve    Patient tolerance of procedure:   Tolerated well, no immediate complications   Comments      5 units orbicularis oculi bilaterally  15 units trapezius  5 units semisplenalis capitis bilaterally  10 units frontalis  All medically necessary

## 2023-06-27 ENCOUNTER — TELEPHONE (OUTPATIENT)
Dept: NEUROLOGY | Facility: CLINIC | Age: 48
End: 2023-06-27

## 2023-06-27 NOTE — TELEPHONE ENCOUNTER
Called  [phone], spoke to Judah Anne  She needs additional info  -nurtec 75 mg qod to prevent migraine HA  - + improvement   -episodic  -rebound HA been ruled out  -no contraindication or hypersensitivity   -fda approved    PA pending   24 hr turnaround     Awaiting determination

## 2023-06-27 NOTE — TELEPHONE ENCOUNTER
received vm- My name is Jing Sanchez, i'm sorry I believe you asked for birth date too which is [birth year]  Phone number is [phone]  I am actually calling about one of my prescriptions  It's the nurtec prescription, i take it as preventative, and there seems to be an issue that I needed approval again  And when I called they said the hold up is with the prescriber  However, I thought this had all been taken care of in the beginning of the the year  However, they are saying that The last approved one was back a year ago in May  So I really need some help with this because I am out of pills  And it really does make a huge difference for me  So if somebody could please call me back  So we can have this straightened out  But I did Ellen Vasques let you know one other thing  I do have a refill bottle in my hand and it's showing that I still have 7 refills through the end of this year  So again, I to me, I do believe there's an error or some space and that this had already been taken care of in the beginning of the year  But if you could please give me a call back, I'd greatly appreciate it  Thank you  Arcadio   -------------------------------------------  Nurtec PA  on 23    Last script was sent to [facility] on 22    Attempted Nurtec PA on [facility] ANIYAH  Urgent request   Key: [id number]  Received [facility] [employer] [facility] Prior Authorization Team is unable to review this request for prior authorization as there is an ongoing prior authorization case in review for this request, Case ID: [id number]  No further action is needed at this time  Called [facility] at [phone] and spoke to   Stephanie  States that I can do renewal PA over the phone  States that she will call back when we can do the renewal over the phone as someone has to change the option in the system    PA can then be marked as Urgent    Left detailed message per communication consent regarding above

## 2023-06-27 NOTE — TELEPHONE ENCOUNTER
Recd  6/27 4:14   hi, my name is Rozina Godwin  I'm calling from [employer] on a recorded line  I am calling to get an update for a mutual patient  Anita Cathy, date of birth, [birth year]  When you get this message, please give me a call back at [phone]  Thank you

## 2023-06-28 NOTE — TELEPHONE ENCOUNTER
Determination not received yet  Called [facility] at [phone] and spoke to [facility] approved through 6/27/24    Approval letter will be faxed     Called [facility] and made them aware of the approval  They will start to process script and they will contact pt       Pt made aware of approval

## 2023-09-21 ENCOUNTER — PROCEDURE VISIT (OUTPATIENT)
Dept: NEUROLOGY | Facility: CLINIC | Age: 48
End: 2023-09-21

## 2023-09-21 VITALS — TEMPERATURE: 98.3 F | SYSTOLIC BLOOD PRESSURE: 136 MMHG | HEART RATE: 88 BPM | DIASTOLIC BLOOD PRESSURE: 89 MMHG

## 2023-09-21 DIAGNOSIS — G43.709 CHRONIC MIGRAINE WITHOUT AURA WITHOUT STATUS MIGRAINOSUS, NOT INTRACTABLE: Primary | ICD-10-CM

## 2023-09-21 NOTE — PROGRESS NOTES
Universal Protocol   Consent: Verbal consent obtained. Written consent obtained. Risks and benefits: risks, benefits and alternatives were discussed  Consent given by: patient  Time out: Immediately prior to procedure a "time out" was called to verify the correct patient, procedure, equipment, support staff and site/side marked as required. Patient understanding: patient states understanding of the procedure being performed  Patient consent: the patient's understanding of the procedure matches consent given  Procedure consent: procedure consent matches procedure scheduled  Relevant documents: relevant documents present and verified  Patient identity confirmed: verbally with patient        Chemodenervation     Date/Time 9/21/2023 8:30 AM     Performed by  Linda Mcintosh PA-C   Authorized by Linda Mcintosh PA-C       Pre-procedure details      Prepped With: Alcohol     Anesthesia  (see MAR for exact dosages):      Anesthesia method:  None   Procedure details     Position:  Upright   Botox     Botox Type:  Type A    Brand:  Botox    mL's of Botulinum Toxin:  200    Final Concentration per CC:  50 units    Needle Gauge:  30 G 2.5 inch   Procedures     Botox Procedures: chronic headache      Indications: migraines     Injection Location      Head / Face:  L superior cervical paraspinal, R superior cervical paraspinal, L , R , L frontalis, R frontalis, L medial occipitalis, R medial occipitalis, procerus, R temporalis, L temporalis, R superior trapezius and L superior trapezius    L  injection amount:  5 unit(s)    R  injection amount:  5 unit(s)    L lateral frontalis:  5 unit(s)    R lateral frontalis:  5 unit(s)    L medial frontalis:  5 unit(s)    R medial frontalis:  5 unit(s)    L temporalis injection amount:  20 unit(s)    R temporalis injection amount:  20 unit(s)    Procerus injection amount:  5 unit(s)    L medial occipitalis injection amount:  15 unit(s)    R medial occipitalis injection amount:  15 unit(s)    L superior cervical paraspinal injection amount:  10 unit(s)    R superior cervical paraspinal injection amount:  10 unit(s)    L superior trapezius injection amount:  15 unit(s)    R superior trapezius injection amount:  15 unit(s)   Total Units     Total units used:  200    Total units discarded:  0   Post-procedure details      Chemodenervation:  Chronic migraine    Facial Nerve Location[de-identified]  Bilateral facial nerve    Patient tolerance of procedure:   Tolerated well, no immediate complications   Comments       5 units orbicularis oculi bilaterally  15 units trapezius  5 units semisplenalis capitis bilaterally  10 units frontalis  All medically necessary

## 2023-11-28 ENCOUNTER — APPOINTMENT (OUTPATIENT)
Dept: LAB | Facility: AMBULARY SURGERY CENTER | Age: 48
End: 2023-11-28
Attending: INTERNAL MEDICINE
Payer: COMMERCIAL

## 2023-11-28 ENCOUNTER — OFFICE VISIT (OUTPATIENT)
Dept: GASTROENTEROLOGY | Facility: AMBULARY SURGERY CENTER | Age: 48
End: 2023-11-28
Payer: COMMERCIAL

## 2023-11-28 VITALS
WEIGHT: 221.6 LBS | HEART RATE: 96 BPM | BODY MASS INDEX: 36.92 KG/M2 | SYSTOLIC BLOOD PRESSURE: 126 MMHG | DIASTOLIC BLOOD PRESSURE: 80 MMHG | HEIGHT: 65 IN | OXYGEN SATURATION: 98 %

## 2023-11-28 DIAGNOSIS — K21.9 GASTROESOPHAGEAL REFLUX DISEASE WITHOUT ESOPHAGITIS: Primary | ICD-10-CM

## 2023-11-28 DIAGNOSIS — K21.9 GASTROESOPHAGEAL REFLUX DISEASE WITHOUT ESOPHAGITIS: ICD-10-CM

## 2023-11-28 DIAGNOSIS — E61.1 IRON DEFICIENCY: ICD-10-CM

## 2023-11-28 DIAGNOSIS — R14.0 BLOATING: ICD-10-CM

## 2023-11-28 DIAGNOSIS — Z12.11 COLON CANCER SCREENING: ICD-10-CM

## 2023-11-28 LAB
FERRITIN SERPL-MCNC: 22 NG/ML (ref 11–307)
IRON SATN MFR SERPL: 27 % (ref 15–50)
IRON SERPL-MCNC: 100 UG/DL (ref 50–212)
TIBC SERPL-MCNC: 367 UG/DL (ref 250–450)
UIBC SERPL-MCNC: 267 UG/DL (ref 155–355)

## 2023-11-28 PROCEDURE — 86364 TISS TRNSGLTMNASE EA IG CLAS: CPT

## 2023-11-28 PROCEDURE — 82784 ASSAY IGA/IGD/IGG/IGM EACH: CPT

## 2023-11-28 PROCEDURE — 82728 ASSAY OF FERRITIN: CPT

## 2023-11-28 PROCEDURE — 99244 OFF/OP CNSLTJ NEW/EST MOD 40: CPT | Performed by: INTERNAL MEDICINE

## 2023-11-28 PROCEDURE — 83540 ASSAY OF IRON: CPT

## 2023-11-28 PROCEDURE — 86231 EMA EACH IG CLASS: CPT

## 2023-11-28 PROCEDURE — 36415 COLL VENOUS BLD VENIPUNCTURE: CPT

## 2023-11-28 PROCEDURE — 86258 DGP ANTIBODY EACH IG CLASS: CPT

## 2023-11-28 PROCEDURE — 83550 IRON BINDING TEST: CPT

## 2023-11-28 RX ORDER — LANSOPRAZOLE 30 MG/1
30 CAPSULE, DELAYED RELEASE ORAL DAILY
Qty: 30 CAPSULE | Refills: 3 | Status: SHIPPED | OUTPATIENT
Start: 2023-11-28

## 2023-11-28 RX ORDER — LANSOPRAZOLE 30 MG/1
CAPSULE, DELAYED RELEASE ORAL
COMMUNITY
Start: 2023-09-15 | End: 2023-11-28 | Stop reason: SDUPTHER

## 2023-11-28 NOTE — PATIENT INSTRUCTIONS
2 weeks lactose free diet    Scheduled date of EGD/colonoscopy (as of today):12/18/23  Physician performing EGD/colonoscopy: Dr. Lacy Kirkland  Location of EGD/colonoscopy:[place] [address]  Desired bowel prep reviewed with patient: Mialax/Dulcolax  Instructions reviewed with patient by:madhav atwood  Clearances:  n/a

## 2023-11-28 NOTE — LETTER
November 28, 2023     Diony Chu MD  [address]    Patient: Binu Chavis   YOB: 1975   Date of Visit: 11/28/2023       Dear Dr. Calixto Comer:    Thank you for referring Binu Chavis to me for evaluation. Below are my notes for this consultation. If you have questions, please do not hesitate to call me. I look forward to following your patient along with you. Sincerely,        Aniceto Rock MD        CC: No Recipients    Aniceto Rock MD  11/28/2023  9:36 PM  Sign when Signing Visit  Consultation - [address]  [facility]  Binu Chavis 53 y.o. female MRN: [id number]          Assessment & Plan:  Pleasant 50-year-old female, longstanding history of typical reflux symptoms, complicated by cough improved with PPI therapy now with worsening epigastric bloating and discomfort. Also due for colon cancer screening. 1.  GERD:  -Continue Prevacid  -Recommend proceeding with an upper endoscopy to evaluate for any Kaur's esophagus, hiatal hernia, H. pylori    2. Upper abdominal bloating and discomfort: Differential is broad, some of his symptoms are consistent with hiatal hernia  -Will proceed with an EGD to further evaluate  -Will check for celiac sprue  -Check ultrasound to evaluate for possible biliary etiology  -Recommend trial of lactose-free diet    3. Colon cancer screening: Patient is due for average risk screening examination  -Will schedule at the same time as her upper endoscopy  -Discussed with the risks of procedures including bleeding, surgery, perforation, missed palpitation rate    Dorothy was seen today for gerd and bloated. Diagnoses and all orders for this visit:    Gastroesophageal reflux disease without esophagitis  -     Celiac Disease Antibody Profile; Future  -     US right upper quadrant; Future  -     lansoprazole (PREVACID) 30 mg capsule; Take 1 capsule (30 mg total) by mouth daily  -     EGD;  Future    Bloating  -     Celiac Disease Antibody Profile; Future  -     US right upper quadrant; Future  -     EGD; Future    Colon cancer screening  -     Colonoscopy; Future    Other orders  -     Diet NPO; Sips with meds; Standing  -     Void on call to OR; Standing            _____________________________________________________________        CC: Upper abdominal bloating and discomfort    HPI:  Rene Ramírez is a 53 y. o.female who was referred for evaluation of upper abdominal bloating and discomfort. This is a 67-year-old female, history of migraines, on Nurtec, she reports proximately 35 years of epigastric discomfort, reflux symptoms with retrosternal burning, regurgitation, nocturnal symptoms. She has had a persistent cough in the past and since being on Prevacid for the past 1 year the symptoms have largely improved. She denies any dysphagia. But more recently she has had symptoms of abdominal bloating and discomfort, worse after eating mostly in her epigastrium. She also notes that when she leans forward she has pronounced pressure in her chest.  The symptoms have worsened over the last 1 year. She is occasional bouts of nausea and vomiting which can sometimes be the only way to relieve her symptoms of abdominal bloating and discomfort. This occurs about twice per month. She has fairly regular bowel moods, denies any melena, rectal bleeding, bloody stools. Denies any significant diarrhea or constipation. Medical history as noted above for migraines. Denies any significant NSAID use. Surgical history is notable for tubal ligation. Denies any tobacco, drinks socially. She works as a  at [employer]. Family history is notable for paternal grandmother with pituitary and lung cancer, maternal grandmother with lung cancer. Father with hiatal hernia. Sister with celiac sprue. ROS:  The remainder of the ROS was negative except for the pertinent positives mentioned in HPI.          Allergies: Patient has no known allergies.     Medications:   Current Outpatient Medications:   •  Botulinum Toxin Type A 200 units SOLR, Inject up to 200 units IM by physician into the head and neck muscles every three months, Disp: 200 Units, Rfl: 4  •  Iron-Vitamin C (Vitron-C)  MG TABS, Take 1 pill three days a week (on Monday, Wednesday, and Friday), Disp: 30 tablet, Rfl: 2  •  lansoprazole (PREVACID) 30 mg capsule, Take 1 capsule (30 mg total) by mouth daily, Disp: 30 capsule, Rfl: 3  •  Nurtec 75 MG TBDP, TAKE 75 MG/1 TABLET BY MOUTH EVERY OTHER DAY TO PREVENT MIGRAINE HEADACHE., Disp: 16 tablet, Rfl: 6'    Past Medical History:   Diagnosis Date   • Anxiety    • Cluster headache 2018    Severe mainly behind right eye   • Depression    • Headache, tension-type 2015    Increasing worse since 2018   • Migraine        Past Surgical History:   Procedure Laterality Date   • TUBAL LIGATION         Family History   Problem Relation Age of Onset   • Hypertension Mother    • Migraines Mother         Had since teenager   • Hypertension Father    • Aneurysm Father         AAA (9.2cm)   • No Known Problems Sister    • No Known Problems Sister    • No Known Problems Daughter    • Lung cancer Maternal Grandmother    • COPD Maternal Grandmother    • Migraines Maternal Grandmother         Had had since teenager   • Stroke Maternal Grandfather          from Stroke    • Lung cancer Paternal Grandmother    • Cancer Paternal Grandmother         pituitary gland ca   • Heart disease Paternal Grandfather    • Stroke Paternal Grandfather          after being bedridden from Stroke few years after initial stroke in    • No Known Problems Son    • No Known Problems Maternal Aunt    • Mental illness Maternal Aunt    • Mental illness Maternal Aunt    • No Known Problems Maternal Uncle    • Lung cancer Paternal Aunt    • No Known Problems Paternal Aunt    • No Known Problems Paternal Aunt    • Mental illness Paternal Aunt    • Stroke Paternal Uncle    • Stroke Paternal Uncle          after being bedridden for approx 2 years   • Migraines Sister         Gets periodically since teenager   • Migraines Sister         Gets periodically since teenager        reports that she has never smoked. She has never used smokeless tobacco. She reports current alcohol use of about 2.0 standard drinks of alcohol per week. She reports that she does not use drugs.           Physical Exam:     /80 (BP Location: Right arm, Patient Position: Sitting, Cuff Size: Standard)   Pulse 96   Ht 5' 5" (1.651 m)   Wt 101 kg (221 lb 9.6 oz)   SpO2 98%   BMI 36.88 kg/m²     Gen: wn/wd, NAD, healthy-appearing female  HEENT: anicteric, MMM, no cervical LAD  CVS: RRR, no m/r/g  CHEST: CTA b/l  ABD: +BS, soft, NT,ND, no hepatosplenomegaly  EXT: no c/c/e  NEURO: aaox3  SKIN: NO rashes

## 2023-11-29 ENCOUNTER — PATIENT MESSAGE (OUTPATIENT)
Dept: SLEEP CENTER | Facility: CLINIC | Age: 48
End: 2023-11-29

## 2023-11-29 LAB
ENDOMYSIUM IGA SER QL: NEGATIVE
GLIADIN PEPTIDE IGA SER-ACNC: 5 UNITS (ref 0–19)
GLIADIN PEPTIDE IGG SER-ACNC: 3 UNITS (ref 0–19)
IGA SERPL-MCNC: 235 MG/DL (ref 87–352)
TTG IGA SER-ACNC: <2 U/ML (ref 0–3)
TTG IGG SER-ACNC: <2 U/ML (ref 0–5)

## 2023-11-29 NOTE — PROGRESS NOTES
Consultation - [facility]) [facility]  Guille Clinton 53 y.o. female MRN: [id number]          Assessment & Plan:  Pleasant 50-year-old female, longstanding history of typical reflux symptoms, complicated by cough improved with PPI therapy now with worsening epigastric bloating and discomfort. Also due for colon cancer screening. 1.  GERD:  -Continue Prevacid  -Recommend proceeding with an upper endoscopy to evaluate for any Akur's esophagus, hiatal hernia, H. pylori    2. Upper abdominal bloating and discomfort: Differential is broad, some of his symptoms are consistent with hiatal hernia  -Will proceed with an EGD to further evaluate  -Will check for celiac sprue  -Check ultrasound to evaluate for possible biliary etiology  -Recommend trial of lactose-free diet    3. Colon cancer screening: Patient is due for average risk screening examination  -Will schedule at the same time as her upper endoscopy  -Discussed with the risks of procedures including bleeding, surgery, perforation, missed palpitation rate    Dorothy was seen today for gerd and bloated. Diagnoses and all orders for this visit:    Gastroesophageal reflux disease without esophagitis  -     Celiac Disease Antibody Profile; Future  -     US right upper quadrant; Future  -     lansoprazole (PREVACID) 30 mg capsule; Take 1 capsule (30 mg total) by mouth daily  -     EGD; Future    Bloating  -     Celiac Disease Antibody Profile; Future  -     US right upper quadrant; Future  -     EGD; Future    Colon cancer screening  -     Colonoscopy; Future    Other orders  -     Diet NPO; Sips with meds; Standing  -     Void on call to OR; Standing            _____________________________________________________________        CC: Upper abdominal bloating and discomfort    HPI:  Guille Clinton is a 53 y. o.female who was referred for evaluation of upper abdominal bloating and discomfort.   This is a 50-year-old female, history of migraines, on Nurtec, she reports proximately 35 years of epigastric discomfort, reflux symptoms with retrosternal burning, regurgitation, nocturnal symptoms. She has had a persistent cough in the past and since being on Prevacid for the past 1 year the symptoms have largely improved. She denies any dysphagia. But more recently she has had symptoms of abdominal bloating and discomfort, worse after eating mostly in her epigastrium. She also notes that when she leans forward she has pronounced pressure in her chest.  The symptoms have worsened over the last 1 year. She is occasional bouts of nausea and vomiting which can sometimes be the only way to relieve her symptoms of abdominal bloating and discomfort. This occurs about twice per month. She has fairly regular bowel moods, denies any melena, rectal bleeding, bloody stools. Denies any significant diarrhea or constipation. Medical history as noted above for migraines. Denies any significant NSAID use. Surgical history is notable for tubal ligation. Denies any tobacco, drinks socially. She works as a  at [employer]. Family history is notable for paternal grandmother with pituitary and lung cancer, maternal grandmother with lung cancer. Father with hiatal hernia. Sister with celiac sprue. ROS:  The remainder of the ROS was negative except for the pertinent positives mentioned in HPI. Allergies: Patient has no known allergies. Medications:   Current Outpatient Medications:      Botulinum Toxin Type A 200 units SOLR, Inject up to 200 units IM by physician into the head and neck muscles every three months, Disp: 200 Units, Rfl: 4    Iron-Vitamin C (Vitron-C)  MG TABS, Take 1 pill three days a week (on Monday, Wednesday, and Friday), Disp: 30 tablet, Rfl: 2    lansoprazole (PREVACID) 30 mg capsule, Take 1 capsule (30 mg total) by mouth daily, Disp: 30 capsule, Rfl: 3    Nurtec 75 MG TBDP, TAKE 75 MG/1 TABLET BY MOUTH EVERY OTHER DAY TO PREVENT MIGRAINE HEADACHE., Disp: 16 tablet, Rfl: 6'    Past Medical History:   Diagnosis Date    Anxiety     Cluster headache 2018    Severe mainly behind right eye    Depression     Headache, tension-type     Increasing worse since 2018    Migraine        Past Surgical History:   Procedure Laterality Date    TUBAL LIGATION         Family History   Problem Relation Age of Onset    Hypertension Mother     Migraines Mother         Had since teenager    Hypertension Father     Aneurysm Father         AAA (9.2cm)    No Known Problems Sister     No Known Problems Sister     No Known Problems Daughter     Lung cancer Maternal Grandmother     COPD Maternal Grandmother     Migraines Maternal Grandmother         Had had since teenager    Stroke Maternal Grandfather          from Stroke     Lung cancer Paternal Grandmother     Cancer Paternal Grandmother         pituitary gland ca    Heart disease Paternal Grandfather     Stroke Paternal Grandfather          after being bedridden from Stroke few years after initial stroke in     No Known Problems Son     No Known Problems Maternal Aunt     Mental illness Maternal Aunt     Mental illness Maternal Aunt     No Known Problems Maternal Uncle     Lung cancer Paternal Aunt     No Known Problems Paternal Aunt     No Known Problems Paternal Aunt     Mental illness Paternal Aunt     Stroke Paternal Uncle     Stroke Paternal Uncle          after being bedridden for approx 2 years    Migraines Sister         Gets periodically since teenager    Migraines Sister         Gets periodically since teenager        reports that she has never smoked. She has never used smokeless tobacco. She reports current alcohol use of about 2.0 standard drinks of alcohol per week. She reports that she does not use drugs.           Physical Exam:     /80 (BP Location: Right arm, Patient Position: Sitting, Cuff Size: Standard)   Pulse 96   Ht 5' 5" (1.651 m)   Wt 101 kg (221 lb 9.6 oz)   SpO2 98%   BMI 36.88 kg/m²     Gen: wn/wd, NAD, healthy-appearing female  HEENT: anicteric, MMM, no cervical LAD  CVS: RRR, no m/r/g  CHEST: CTA b/l  ABD: +BS, soft, NT,ND, no hepatosplenomegaly  EXT: no c/c/e  NEURO: aaox3  SKIN: NO rashes

## 2023-12-03 ENCOUNTER — ANESTHESIA EVENT (OUTPATIENT)
Dept: ANESTHESIOLOGY | Facility: HOSPITAL | Age: 48
End: 2023-12-03

## 2023-12-03 ENCOUNTER — ANESTHESIA (OUTPATIENT)
Dept: ANESTHESIOLOGY | Facility: HOSPITAL | Age: 48
End: 2023-12-03

## 2023-12-08 ENCOUNTER — HOSPITAL ENCOUNTER (OUTPATIENT)
Dept: RADIOLOGY | Facility: IMAGING CENTER | Age: 48
End: 2023-12-08
Payer: COMMERCIAL

## 2023-12-08 DIAGNOSIS — K21.9 GASTROESOPHAGEAL REFLUX DISEASE WITHOUT ESOPHAGITIS: ICD-10-CM

## 2023-12-08 DIAGNOSIS — R14.0 BLOATING: ICD-10-CM

## 2023-12-08 PROCEDURE — 76705 ECHO EXAM OF ABDOMEN: CPT

## 2023-12-13 NOTE — PATIENT COMMUNICATION
Called patient and advised of lab results and Dr. Marlen Ceballos message. Patient verbalized understanding. Offered follow up 12/19/23 but patient declined. Scheduled next available appointment 7/15/24.   Added to wait list.

## 2023-12-18 ENCOUNTER — HOSPITAL ENCOUNTER (OUTPATIENT)
Dept: GASTROENTEROLOGY | Facility: AMBULARY SURGERY CENTER | Age: 48
Setting detail: OUTPATIENT SURGERY
Discharge: HOME | End: 2023-12-18
Attending: INTERNAL MEDICINE | Admitting: INTERNAL MEDICINE
Payer: COMMERCIAL

## 2023-12-18 ENCOUNTER — ANESTHESIA (OUTPATIENT)
Dept: GASTROENTEROLOGY | Facility: AMBULARY SURGERY CENTER | Age: 48
End: 2023-12-18

## 2023-12-18 ENCOUNTER — ANESTHESIA EVENT (OUTPATIENT)
Dept: GASTROENTEROLOGY | Facility: AMBULARY SURGERY CENTER | Age: 48
End: 2023-12-18

## 2023-12-18 VITALS
HEART RATE: 73 BPM | DIASTOLIC BLOOD PRESSURE: 80 MMHG | RESPIRATION RATE: 20 BRPM | SYSTOLIC BLOOD PRESSURE: 125 MMHG | TEMPERATURE: 98.1 F | OXYGEN SATURATION: 100 %

## 2023-12-18 DIAGNOSIS — R14.0 BLOATING: ICD-10-CM

## 2023-12-18 DIAGNOSIS — K21.9 GASTROESOPHAGEAL REFLUX DISEASE WITHOUT ESOPHAGITIS: ICD-10-CM

## 2023-12-18 DIAGNOSIS — Z12.11 COLON CANCER SCREENING: ICD-10-CM

## 2023-12-18 DIAGNOSIS — K21.00 GASTROESOPHAGEAL REFLUX DISEASE WITH ESOPHAGITIS WITHOUT HEMORRHAGE: Primary | ICD-10-CM

## 2023-12-18 PROBLEM — E66.812 CLASS 2 OBESITY IN ADULT: Status: ACTIVE | Noted: 2023-12-18

## 2023-12-18 PROBLEM — E66.9 CLASS 2 OBESITY IN ADULT: Status: ACTIVE | Noted: 2023-12-18

## 2023-12-18 PROCEDURE — 88305 TISSUE EXAM BY PATHOLOGIST: CPT | Performed by: PATHOLOGY

## 2023-12-18 PROCEDURE — 45378 DIAGNOSTIC COLONOSCOPY: CPT | Performed by: INTERNAL MEDICINE

## 2023-12-18 PROCEDURE — 43239 EGD BIOPSY SINGLE/MULTIPLE: CPT | Performed by: INTERNAL MEDICINE

## 2023-12-18 RX ORDER — PROPOFOL 10 MG/ML
INJECTION, EMULSION INTRAVENOUS AS NEEDED
Status: DISCONTINUED | OUTPATIENT
Start: 2023-12-18 | End: 2023-12-18

## 2023-12-18 RX ORDER — SODIUM CHLORIDE, SODIUM LACTATE, POTASSIUM CHLORIDE, CALCIUM CHLORIDE 600; 310; 30; 20 MG/100ML; MG/100ML; MG/100ML; MG/100ML
125 INJECTION, SOLUTION INTRAVENOUS CONTINUOUS
Status: DISCONTINUED | OUTPATIENT
Start: 2023-12-18 | End: 2023-12-22 | Stop reason: HOSPADM

## 2023-12-18 RX ORDER — SODIUM CHLORIDE, SODIUM LACTATE, POTASSIUM CHLORIDE, CALCIUM CHLORIDE 600; 310; 30; 20 MG/100ML; MG/100ML; MG/100ML; MG/100ML
INJECTION, SOLUTION INTRAVENOUS CONTINUOUS PRN
Status: DISCONTINUED | OUTPATIENT
Start: 2023-12-18 | End: 2023-12-18

## 2023-12-18 RX ORDER — LIDOCAINE HYDROCHLORIDE 10 MG/ML
INJECTION, SOLUTION EPIDURAL; INFILTRATION; INTRACAUDAL; PERINEURAL AS NEEDED
Status: DISCONTINUED | OUTPATIENT
Start: 2023-12-18 | End: 2023-12-18

## 2023-12-18 RX ORDER — PANTOPRAZOLE SODIUM 40 MG/1
40 TABLET, DELAYED RELEASE ORAL 2 TIMES DAILY
Qty: 60 TABLET | Refills: 3 | Status: SHIPPED | OUTPATIENT
Start: 2023-12-18

## 2023-12-18 RX ORDER — LIDOCAINE HYDROCHLORIDE 10 MG/ML
0.5 INJECTION, SOLUTION EPIDURAL; INFILTRATION; INTRACAUDAL; PERINEURAL ONCE AS NEEDED
Status: DISCONTINUED | OUTPATIENT
Start: 2023-12-18 | End: 2023-12-22 | Stop reason: HOSPADM

## 2023-12-18 RX ADMIN — LIDOCAINE HYDROCHLORIDE 80 MG: 10 INJECTION, SOLUTION EPIDURAL; INFILTRATION; INTRACAUDAL; PERINEURAL at 12:47

## 2023-12-18 RX ADMIN — PROPOFOL 50 MG: 10 INJECTION, EMULSION INTRAVENOUS at 12:55

## 2023-12-18 RX ADMIN — SODIUM CHLORIDE, SODIUM LACTATE, POTASSIUM CHLORIDE, AND CALCIUM CHLORIDE: .6; .31; .03; .02 INJECTION, SOLUTION INTRAVENOUS at 12:38

## 2023-12-18 RX ADMIN — PROPOFOL 50 MG: 10 INJECTION, EMULSION INTRAVENOUS at 13:03

## 2023-12-18 RX ADMIN — PROPOFOL 100 MG: 10 INJECTION, EMULSION INTRAVENOUS at 12:47

## 2023-12-18 RX ADMIN — PROPOFOL 50 MG: 10 INJECTION, EMULSION INTRAVENOUS at 12:59

## 2023-12-18 RX ADMIN — PROPOFOL 50 MG: 10 INJECTION, EMULSION INTRAVENOUS at 12:52

## 2023-12-18 RX ADMIN — SODIUM CHLORIDE, SODIUM LACTATE, POTASSIUM CHLORIDE, AND CALCIUM CHLORIDE 125 ML/HR: .6; .31; .03; .02 INJECTION, SOLUTION INTRAVENOUS at 12:35

## 2023-12-18 RX ADMIN — PROPOFOL 50 MG: 10 INJECTION, EMULSION INTRAVENOUS at 12:48

## 2023-12-18 NOTE — H&P
History and Physical - [facility]  Dorothy Curtis 48 y.o. female MRN: [id number]    HPI: Dorothy Curtis is a 48 y.o. year old female who presents with GERD, abd pain, and screening colonoscopy.       Review of Systems    Historical Information   Past Medical History:   Diagnosis Date    Anxiety     Cluster headache 2018    Severe mainly behind right eye    Depression     Headache, tension-type 2015    Increasing worse since 2018    Migraine      Past Surgical History:   Procedure Laterality Date    TUBAL LIGATION       Social History   Social History     Substance and Sexual Activity   Alcohol Use Yes    Alcohol/week: 2.0 standard drinks of alcohol    Types: 1 Glasses of wine, 1 Standard drinks or equivalent per week    Comment: Drink socially varying 1-2 drink per 2 wks, wine or mixed dr     Social History     Substance and Sexual Activity   Drug Use No     Social History     Tobacco Use   Smoking Status Never   Smokeless Tobacco Never     Family History   Problem Relation Age of Onset    Hypertension Mother     Migraines Mother         Had since teenager    Hypertension Father     Aneurysm Father         AAA (9.2cm)    No Known Problems Sister     No Known Problems Sister     No Known Problems Daughter     Lung cancer Maternal Grandmother     COPD Maternal Grandmother     Migraines Maternal Grandmother         Had had since teenager    Stroke Maternal Grandfather          from Stroke     Lung cancer Paternal Grandmother     Cancer Paternal Grandmother         pituitary gland ca    Heart disease Paternal Grandfather     Stroke Paternal Grandfather          after being bedridden from Stroke few years after initial stroke in     No Known Problems Son     No Known Problems Maternal Aunt     Mental illness Maternal Aunt     Mental illness Maternal Aunt     No Known Problems Maternal Uncle     Lung cancer Paternal Aunt     No Known Problems Paternal Aunt     No Known Problems  Paternal Aunt     Mental illness Paternal Aunt     Stroke Paternal Uncle     Stroke Paternal Uncle          after being bedridden for approx 2 years    Migraines Sister         Gets periodically since teenager    Migraines Sister         Gets periodically since teenager       Meds/Allergies     (Not in a hospital admission)      No Known Allergies    Objective     /80   Pulse 92   Temp 98.1 °F (36.7 °C) (Temporal)   Resp 18   SpO2 98%       PHYSICAL EXAM    Gen: NAD  CV: RRR  CHEST: Clear  ABD: soft, NT/ND  EXT: no edema  Neuro: AAO      ASSESSMENT/PLAN:  This is a 48 y.o. year old female here for  GERD, abd pain, and screening colonoscopy.     PLAN:   Procedure: egd/colonoscopy

## 2023-12-18 NOTE — ANESTHESIA PREPROCEDURE EVALUATION
Procedure:  EGD  COLONOSCOPY    Relevant Problems   ANESTHESIA   (+) Motion sickness      CARDIO   (+) Chronic migraine without aura without status migrainosus, not intractable      ENDO (within normal limits)      GI/HEPATIC   (+) GERD (gastroesophageal reflux disease)      MUSCULOSKELETAL   (+) Myofascial pain syndrome      NEURO/PSYCH   (+) Anxiety and depression   (+) Chronic migraine without aura without status migrainosus, not intractable   (+) Left arm weakness   (+) Myofascial pain syndrome   (+) Trigeminal autonomic cephalgias      PULMONARY   (+) SHANDRA (obstructive sleep apnea)      Nervous and Auditory   (+) Cervical radiculopathy   (+) Neuropathy of left brachial plexus   (+) Peripheral neuropathy      Other   (+) Class 2 obesity in adult        Physical Exam    Airway    Mallampati score: II  TM Distance: >3 FB  Neck ROM: full     Dental   No notable dental hx     Cardiovascular      Pulmonary      Other Findings  post-pubertal.      Anesthesia Plan  ASA Score- 2     Anesthesia Type- IV sedation with anesthesia with ASA Monitors.         Additional Monitors:     Airway Plan:            Plan Factors-Exercise tolerance (METS): >4 METS.    Chart reviewed.   Existing labs reviewed. Patient summary reviewed.    Patient is not a current smoker.              Induction-     Postoperative Plan-     Informed Consent- Anesthetic plan and risks discussed with patient.  I personally reviewed this patient with the CRNA. Discussed and agreed on the Anesthesia Plan with the CRNA..

## 2023-12-21 ENCOUNTER — PROCEDURE VISIT (OUTPATIENT)
Dept: NEUROLOGY | Facility: CLINIC | Age: 48
End: 2023-12-21
Payer: COMMERCIAL

## 2023-12-21 VITALS
DIASTOLIC BLOOD PRESSURE: 84 MMHG | SYSTOLIC BLOOD PRESSURE: 128 MMHG | WEIGHT: 217.7 LBS | BODY MASS INDEX: 36.27 KG/M2 | RESPIRATION RATE: 14 BRPM | TEMPERATURE: 98.9 F | OXYGEN SATURATION: 99 % | HEART RATE: 89 BPM | HEIGHT: 65 IN

## 2023-12-21 DIAGNOSIS — G43.709 CHRONIC MIGRAINE WITHOUT AURA WITHOUT STATUS MIGRAINOSUS, NOT INTRACTABLE: Primary | ICD-10-CM

## 2023-12-21 PROCEDURE — 88305 TISSUE EXAM BY PATHOLOGIST: CPT | Performed by: PATHOLOGY

## 2023-12-21 PROCEDURE — 64615 CHEMODENERV MUSC MIGRAINE: CPT | Performed by: PHYSICIAN ASSISTANT

## 2023-12-21 NOTE — PROGRESS NOTES
"Universal Protocol   Consent: Verbal consent obtained. Written consent obtained.  Risks and benefits: risks, benefits and alternatives were discussed  Consent given by: patient  Time out: Immediately prior to procedure a \"time out\" was called to verify the correct patient, procedure, equipment, support staff and site/side marked as required.  Patient understanding: patient states understanding of the procedure being performed  Patient consent: the patient's understanding of the procedure matches consent given  Procedure consent: procedure consent matches procedure scheduled  Relevant documents: relevant documents present and verified  Patient identity confirmed: verbally with patient      Chemodenervation     Date/Time  12/21/2023 8:30 AM     Performed by  Delphine Herrera PA-C   Authorized by  Delphine Herrera PA-C     Pre-procedure details      Prepped With: Alcohol     Anesthesia  (see MAR for exact dosages):     Anesthesia method:  None   Procedure details      Position:  Upright   Botox      Botox Type:  Type A    Brand:  Botox    mL's of Botulinum Toxin:  200    Final Concentration per CC:  50 units    Needle Gauge:  30 G 2.5 inch   Procedures      Botox Procedures: chronic headache      Indications: migraines     Injection Location      Head / Face:  L superior cervical paraspinal, R superior cervical paraspinal, L , R , L frontalis, R frontalis, L medial occipitalis, R medial occipitalis, procerus, R temporalis, L temporalis, R superior trapezius and L superior trapezius    L  injection amount:  5 unit(s)    R  injection amount:  5 unit(s)    L lateral frontalis:  5 unit(s)    R lateral frontalis:  5 unit(s)    L medial frontalis:  5 unit(s)    R medial frontalis:  5 unit(s)    L temporalis injection amount:  20 unit(s)    R temporalis injection amount:  20 unit(s)    Procerus injection amount:  5 unit(s)    L medial occipitalis injection amount:  15 unit(s)    R medial " occipitalis injection amount:  15 unit(s)    L superior cervical paraspinal injection amount:  10 unit(s)    R superior cervical paraspinal injection amount:  10 unit(s)    L superior trapezius injection amount:  15 unit(s)    R superior trapezius injection amount:  15 unit(s)   Total Units      Total units used:  200    Total units discarded:  0   Post-procedure details      Chemodenervation:  Chronic migraine    Facial Nerve Location::  Bilateral facial nerve    Patient tolerance of procedure:  Tolerated well, no immediate complications   Comments       5 units orbicularis oculi bilaterally  15 units trapezius  5 units semisplenalis capitis bilaterally  10 units frontalis  All medically necessary

## 2024-01-09 DIAGNOSIS — G43.009 MIGRAINE WITHOUT AURA AND WITHOUT STATUS MIGRAINOSUS, NOT INTRACTABLE: ICD-10-CM

## 2024-01-09 RX ORDER — RIMEGEPANT SULFATE 75 MG/75MG
TABLET, ORALLY DISINTEGRATING ORAL
Qty: 16 TABLET | Refills: 11 | Status: SHIPPED | OUTPATIENT
Start: 2024-01-09 | End: 2024-01-15

## 2024-01-09 NOTE — TELEPHONE ENCOUNTER
Received VM transcription from 1/8/24, 11:38 AM:    Good morning. My name is Dorothy Curtis. Date of birth is [birth year]. I need my prescription refilled for Nurtec which goes through the [facility] pharmacy. I spoke to them today and they said they will be sending a request out to you. But I wanted to touch base with you first because I need these as soon as possible. So I just wanted to make sure that you were aware that the request for this was coming through. If you could please call me back at [phone]. Thank you. Arcadio.  ------------------------    Delphine - Rx entered. Please review and sign if in agreement.

## 2024-01-11 DIAGNOSIS — K21.00 GASTROESOPHAGEAL REFLUX DISEASE WITH ESOPHAGITIS WITHOUT HEMORRHAGE: ICD-10-CM

## 2024-01-11 RX ORDER — PANTOPRAZOLE SODIUM 40 MG/1
40 TABLET, DELAYED RELEASE ORAL 2 TIMES DAILY
Qty: 180 TABLET | Refills: 1 | Status: SHIPPED | OUTPATIENT
Start: 2024-01-11

## 2024-01-15 ENCOUNTER — TELEMEDICINE (OUTPATIENT)
Dept: NEUROLOGY | Facility: CLINIC | Age: 49
End: 2024-01-15
Payer: COMMERCIAL

## 2024-01-15 DIAGNOSIS — G43.709 CHRONIC MIGRAINE WITHOUT AURA WITHOUT STATUS MIGRAINOSUS, NOT INTRACTABLE: ICD-10-CM

## 2024-01-15 DIAGNOSIS — G43.009 MIGRAINE WITHOUT AURA AND WITHOUT STATUS MIGRAINOSUS, NOT INTRACTABLE: Primary | ICD-10-CM

## 2024-01-15 PROCEDURE — 99214 OFFICE O/P EST MOD 30 MIN: CPT | Performed by: PHYSICIAN ASSISTANT

## 2024-01-15 RX ORDER — METOCLOPRAMIDE 10 MG/1
10 TABLET ORAL 4 TIMES DAILY
Qty: 15 TABLET | Refills: 3 | Status: SHIPPED | OUTPATIENT
Start: 2024-01-15

## 2024-01-15 RX ORDER — RIMEGEPANT SULFATE 75 MG/75MG
75 TABLET, ORALLY DISINTEGRATING ORAL AS NEEDED
Qty: 16 TABLET | Refills: 6 | Status: SHIPPED | OUTPATIENT
Start: 2024-01-15

## 2024-01-15 NOTE — PATIENT INSTRUCTIONS
Preventive therapy for headaches:   Continue botox every 3 month  Emgality 2 injection first month then 1 injection every 28 days    Abortive therapy for headaches:   At the onset of a severe headache take Nurtec 75 mg.  Limit of 1 in 24 hours.    May use metoclopramide 10 mg every 8 hours as needed  If that fails, use ibuprofen and Benadryl 50 mg.    Neck pain:   - We discussed the role of neck pathology and poor posture, with straightening of the normal cervical lordosis, in headaches. We discussed how tightening of the neck muscles can irritate the nerves in the occipital region of her head and cause or worsen head pain. We also discussed and demonstrated neck strengthening and relaxation exercises, as well as giving written instructions on these exercises.     - We talked about the importance of good posture for improving shoulder, neck, and head pain. The patient was given visualization exercises for correcting posture, which patient will practice at home. If this simple exercise does not help improve the posture, we will consider formal physical therapy in the future.     Medication overuse headaches:   - We discussed medication overuse headache (MOH) and how to avoid it in the future. It was explained that all analgesics have the potential to cause medication overuse headache (MOH) and analgesic overuse can negate the effectiveness of headache preventive measures. After successful MOH treatment, preventive medications for an underlying primary headache disorder have a greater chance for success. Avoid medications with narcotics, barbiturates, or caffeine in them as these can cause rebound headaches after very few doses and can interfere with other headache medicine efficacy. Taking any analgesics for more than 2-3 days a week can cause medication overuse headache.     Reproductive age women: Should take folic acid daily when taking anti-seizure drugs especially Depakote.     [place] Prescription Drug  "Monitoring Program report was reviewed and was appropriate      Headache management instructions  - When patient has a moderate to severe headache, they should seek rest, initiate relaxation and apply cold compresses to the head.   - Maintain regular sleep schedule. Adults need at least 7-8 hours of uninterrupted a night.   - Limit over the counter medications such as Tylenol, Ibuprofen, Aleve, Excedrin. (No more than 3 times a week).  - Maintain headache diary.  We discussed an NOEL for a smart phone is \"Migraine juan ramon\"  - Limit caffeine to 1-2 cups 8 to 16 oz a day or less.  - Avoid dietary trigger. (aged cheese, peanuts, MSG, aspartame and nitrates).  - Patient is to have regular frequent meals to prevent headache onset.    - Please drink at least 64 ounces of water a day to help remain hydrated.    Please call with any questions or concerns. Office number is [phone]  "

## 2024-01-15 NOTE — PROGRESS NOTES
Virtual Regular Visit    Verification of patient location:    Patient is located at Home in the following state in which I hold an active license [place]      Assessment/Plan:    Problem List Items Addressed This Visit        Cardiovascular and Mediastinum    Chronic migraine without aura without status migrainosus, not intractable     Preventive therapy for headaches:   Continue botox every 3 month  Emgality 2 injection first month then 1 injection every 28 days    Abortive therapy for headaches:   At the onset of a severe headache take Nurtec 75 mg.  Limit of 1 in 24 hours.    May use metoclopramide 10 mg every 8 hours as needed  If that fails, use ibuprofen and Benadryl 50 mg.         Relevant Medications    Galcanezumab-gnlm 120 MG/ML SOAJ    Galcanezumab-gnlm 120 MG/ML SOAJ    rimegepant sulfate (Nurtec) 75 mg TBDP   Other Visit Diagnoses     Migraine without aura and without status migrainosus, not intractable    -  Primary    Relevant Medications    metoclopramide (Reglan) 10 mg tablet    Galcanezumab-gnlm 120 MG/ML SOAJ    Galcanezumab-gnlm 120 MG/ML SOAJ    rimegepant sulfate (Nurtec) 75 mg TBDP               Reason for visit is   Chief Complaint   Patient presents with   • Virtual Regular Visit          Encounter provider Delphine Herrera PA-C    Provider located at [facility]  NEUROLOGY [address] [address] [phone]      Recent Visits  No visits were found meeting these conditions.  Showing recent visits within past 7 days and meeting all other requirements  Today's Visits  Date Type Provider Dept   01/15/24 Telemedicine Delphine Herrera PA-C [facility]   Showing today's visits and meeting all other requirements  Future Appointments  No visits were found meeting these conditions.  Showing future appointments within next 150 days and meeting all other requirements       The patient was identified by name and date of birth. Dorothy  Kirsten was informed that this is a telemedicine visit and that the visit is being conducted through the Epic Embedded platform. She agrees to proceed..  My office door was closed. The patient was notified the following individuals were present in the room Dr Wang.  She acknowledged consent and understanding of privacy and security of the video platform. The patient has agreed to participate and understands they can discontinue the visit at any time.    Patient is aware this is a billable service.     Subjective  Dorothy Curtis is a 48 y.o. 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       Chronic migraine headaches:   What medications do you take or have you taken for your headaches?   Current Preventative:  Botox  Nurtec     Current Abortive:  Decadron  Nurtec     Prior PREVENTATIVE:  melatonin, multivitamin, B12, biotin, vitamin-D  Depakote, gabapentin  amitriptyline (gain weight on it) venlafaxine, cymbalta  Robaxin, tizanidine, Baclofen,  Nurtec     Prior ABORTIVE:  methylprednisolone,   ketorolac, diclofenac  Fioricet (stopped in 2015)  Depakote, Gabapentin (feels like been out drinking), Olanzapine  prochlorperazine  Excedrin, Aleve, ibuprofen  DHE  Sumatriptan, Rizatriptan     Headache are worse if the patient: cough, sneeze, bending over  Headache triggers:  Not sure     Aura/Warning and how long does it last - blurry vision - within half an hour     What is your current pain level - 2-3/10     How often do the headaches occur?   mild headache: 3-4 a week; prior to Botox was daily  Moderate/Severe headaches 3-7 a month; prior to Botox was daily     What time of the day do the headaches start?   mild pain: varies  Severe headache: varies     How long do the headaches last?   mild headache:  6-8 hours  Moderate/Severe headache: a few hours to rest of the days; prior 1-2 days      Are you ever headache free? yes     Describe your usual headache -   mild pain: Throbbing  Severe pain:  Stabbing     Where is your headache located?   mild headache: anywhere in the head (usually temple to across forehead)  Severe headache: right frontal and parietal region     What is the intensity of pain?   mild headaches:  3-8/10  Moderate/severe headaches: 7-810      Associated symptoms:   Decrease of appetite, nausea, vomiting, diarrhea  Insomnia  Photophobia, phonophobia, sensitivity to smell   Problem with concentration  Blurred vision,   Pupil dilated on the right  Hands or feet tingle or feel numb  flushing of face  Lacrimation, runny or stuffed-up nose,   light-headed or dizzy, stiff or sore neck  prefer to be alone and in a dark room, unable to work     Number of days missed per month because of headaches:  Work (or school) days: once in past 6 momthsn  Social or Family activities:  none, was often before Botox     What time of the year do headaches occur more frequently? Not sure  Have you seen someone else for headaches or pain? Yes  Have you had trigger point injection performed and how often? No  Have you had Botox injection performed and how often? Yes  Have you had epidural injections or transforaminal injections performed? Yes     EM2018:   This is an abnormal study.   There is electrophysiological evidence consistent with an active subacute to chronic left C5/C6 radiculopathy.   There was no electrophysiological evidence of median or ulnar entrapment neuropathy, or brachial plexopathy in the left upper extremity      Sleep Habit  Is your sleep restful? yes  How often do you get up at night? maybe once; previous was 3-5 from her animals   Do you wake up with headaches? yes sometimes  Do you snore while asleep? no  Have you been told that you stop breathing during sleeping?    no  Do you wake up tired in the morning? no  Do you take frequent naps during the day? no  Do you have jaw pain? yes  Do you grind/clench your teeth at night? yes  Do you have restless leg syndrome? no  Do you have  nightmare or sleep walk? Yes, has significant sleep terrors which started in her 20s.  Wakes her  up screaming while she is still asleep.  When she takes anything to sleep, even melatonin this happens. She otherwise wakes up 3-5 times a night     Alternative therapies used in the past for headaches? physical therapy  Have you used CBD or THC for your headaches and how often? No  Are you current pregnant or planning on getting pregnant? No  Have you ever had any Brain imaging? yes        I personally reviewed these images.     With botox has had a reduction of at least 7 migraine days with less abortive medication, less ER visits which correlates to headache diary     Reviewed old notes from physician seen in the past- see above HPI for summary of previous encounters.     . .    Past Medical History:   Diagnosis Date   • Anxiety    • Cluster headache 9/2018    Severe mainly behind right eye   • Depression    • Headache, tension-type 2015    Increasing worse since 9/2018   • Migraine        Past Surgical History:   Procedure Laterality Date   • TUBAL LIGATION         Current Outpatient Medications   Medication Sig Dispense Refill   • Botulinum Toxin Type A 200 units SOLR Inject up to 200 units IM by physician into the head and neck muscles every three months 200 Units 4   • Galcanezumab-gnlm 120 MG/ML SOAJ Inject 240 mg under the skin once for 1 dose 2 mL 0   • Galcanezumab-gnlm 120 MG/ML SOAJ Inject 120 mg under the skin every 28 days 1 mL 11   • Iron-Vitamin C (Vitron-C)  MG TABS Take 1 pill three days a week (on Monday, Wednesday, and Friday) 30 tablet 2   • metoclopramide (Reglan) 10 mg tablet Take 1 tablet (10 mg total) by mouth 4 (four) times a day 15 tablet 3   • pantoprazole (PROTONIX) 40 mg tablet TAKE 1 TABLET BY MOUTH TWICE A  tablet 1   • rimegepant sulfate (Nurtec) 75 mg TBDP Take 1 tablet (75 mg total) by mouth as needed (migraine) Limit of 1 in 24 hours 16 tablet 6     No current  facility-administered medications for this visit.        No Known Allergies   I have reviewed the patient's medical, social and surgical history as well as medications in detail and updated the computerized patient record.    Review of Systems   Constitutional:  Negative for appetite change, fatigue and fever.   HENT: Negative.  Negative for hearing loss, tinnitus, trouble swallowing and voice change.    Eyes:  Positive for photophobia. Negative for pain and visual disturbance.   Respiratory: Negative.  Negative for shortness of breath.    Cardiovascular: Negative.  Negative for palpitations.   Gastrointestinal:  Positive for nausea and vomiting.   Endocrine: Negative.  Negative for cold intolerance.   Genitourinary: Negative.  Negative for dysuria, frequency and urgency.   Musculoskeletal:  Negative for back pain, gait problem, myalgias, neck pain and neck stiffness.   Skin: Negative.  Negative for rash.   Allergic/Immunologic: Negative.    Neurological:  Positive for dizziness, light-headedness and headaches. Negative for tremors, seizures, syncope, facial asymmetry, speech difficulty and weakness.   Hematological: Negative.  Does not bruise/bleed easily.   Psychiatric/Behavioral: Negative.  Negative for confusion, hallucinations and sleep disturbance.    I personally reviewed and updated the ROS that was entered by the medical assistant      Video Exam    There were no vitals filed for this visit.    Physical Exam   CONSTITUTIONAL: Well developed, well nourished, well groomed. No dysmorphic features.     HEENT:  Normocephalic atraumatic.    Chest:  Respirations regular and unlabored.    Psychiatric:  Normal behavior and appropriate affect      MENTAL STATUS  Orientation: Alert and oriented x 3  Fund of knowledge: Intact.    Visit Time  I have spent a total time of 33 minutes on 01/15/24 in caring for this patient including Prognosis, Risks and benefits of tx options, Instructions for management, Patient and  family education, Importance of tx compliance, Risk factor reductions, Impressions, Counseling / Coordination of care, Documenting in the medical record, Reviewing / ordering tests, medicine, procedures  , and Obtaining or reviewing history  .

## 2024-01-15 NOTE — ASSESSMENT & PLAN NOTE
Preventive therapy for headaches:   Continue botox every 3 month  Emgality 2 injection first month then 1 injection every 28 days    Abortive therapy for headaches:   At the onset of a severe headache take Nurtec 75 mg.  Limit of 1 in 24 hours.    May use metoclopramide 10 mg every 8 hours as needed  If that fails, use ibuprofen and Benadryl 50 mg.

## 2024-01-30 ENCOUNTER — PATIENT MESSAGE (OUTPATIENT)
Dept: NEUROLOGY | Facility: CLINIC | Age: 49
End: 2024-01-30

## 2024-01-31 ENCOUNTER — TELEPHONE (OUTPATIENT)
Dept: NEUROLOGY | Facility: CLINIC | Age: 49
End: 2024-01-31

## 2024-01-31 NOTE — TELEPHONE ENCOUNTER
Please do a STAT auth for emgality and nurtec.  Both sent to pharm on 1/15/2024 and has not heard anything as of yet

## 2024-01-31 NOTE — TELEPHONE ENCOUNTER
Chart reviewed  There is active [place] approval for Nurtec good through 6/27/24    Urgent Emgality PA initiated on CMM. (Key: [id number])    Awaiting determination

## 2024-02-02 NOTE — TELEPHONE ENCOUNTER
Called [facility] pharmacy, spoke to Jamil and advised of all of the below. She verbalized understanding. She got a paid claim for the loading dose. Copay is $270. She will have to order it for Monday. States that pt will get a notification when the meds are ready for .   [facility]

## 2024-02-21 PROBLEM — N30.01 ACUTE CYSTITIS WITH HEMATURIA: Status: RESOLVED | Noted: 2020-02-17 | Resolved: 2024-02-21

## 2024-03-04 ENCOUNTER — TELEPHONE (OUTPATIENT)
Dept: NEUROLOGY | Facility: CLINIC | Age: 49
End: 2024-03-04

## 2024-03-04 NOTE — TELEPHONE ENCOUNTER
Pt current auth on file is set to  3/14/2024 before pt next Botox inj. May we have tele notes signed so that I may submit on behalf of pt to [facility].

## 2024-03-11 NOTE — TELEPHONE ENCOUNTER
Received fax from [facility] with approval details below.     Approved   Botox 200 UNITS  Qty.1  Auth# [id number]  Valid:3/21/2024-3/21/2025  Visits: 4    Please use Stock

## 2024-03-21 ENCOUNTER — PROCEDURE VISIT (OUTPATIENT)
Dept: NEUROLOGY | Facility: CLINIC | Age: 49
End: 2024-03-21
Payer: COMMERCIAL

## 2024-03-21 VITALS — HEART RATE: 104 BPM | TEMPERATURE: 98.5 F | DIASTOLIC BLOOD PRESSURE: 86 MMHG | SYSTOLIC BLOOD PRESSURE: 118 MMHG

## 2024-03-21 DIAGNOSIS — G43.709 CHRONIC MIGRAINE WITHOUT AURA WITHOUT STATUS MIGRAINOSUS, NOT INTRACTABLE: Primary | ICD-10-CM

## 2024-03-21 PROCEDURE — 64615 CHEMODENERV MUSC MIGRAINE: CPT | Performed by: PHYSICIAN ASSISTANT

## 2024-03-21 NOTE — PROGRESS NOTES
"Universal Protocol   Consent: Verbal consent obtained. Written consent obtained.  Risks and benefits: risks, benefits and alternatives were discussed  Consent given by: patient  Time out: Immediately prior to procedure a \"time out\" was called to verify the correct patient, procedure, equipment, support staff and site/side marked as required.  Patient understanding: patient states understanding of the procedure being performed  Patient consent: the patient's understanding of the procedure matches consent given  Procedure consent: procedure consent matches procedure scheduled  Relevant documents: relevant documents present and verified  Patient identity confirmed: verbally with patient      Chemodenervation     Date/Time  3/21/2024 8:30 AM     Performed by  Delphine Herrera PA-C   Authorized by  Delphine Herrera PA-C     Pre-procedure details      Prepped With: Alcohol     Anesthesia  (see MAR for exact dosages):     Anesthesia method:  None   Procedure details      Position:  Upright   Botox      Botox Type:  Type A    Brand:  Botox    mL's of Botulinum Toxin:  200    Final Concentration per CC:  50 units    Needle Gauge:  30 G 2.5 inch   Procedures      Botox Procedures: chronic headache      Indications: migraines     Injection Location      Head / Face:  L superior cervical paraspinal, R superior cervical paraspinal, L , R , L frontalis, R frontalis, L medial occipitalis, R medial occipitalis, procerus, R temporalis, L temporalis, R superior trapezius and L superior trapezius    L  injection amount:  5 unit(s)    R  injection amount:  5 unit(s)    L lateral frontalis:  5 unit(s)    R lateral frontalis:  5 unit(s)    L medial frontalis:  5 unit(s)    R medial frontalis:  5 unit(s)    L temporalis injection amount:  20 unit(s)    R temporalis injection amount:  20 unit(s)    Procerus injection amount:  5 unit(s)    L medial occipitalis injection amount:  15 unit(s)    R medial " occipitalis injection amount:  15 unit(s)    L superior cervical paraspinal injection amount:  10 unit(s)    R superior cervical paraspinal injection amount:  10 unit(s)    L superior trapezius injection amount:  15 unit(s)    R superior trapezius injection amount:  15 unit(s)   Total Units      Total units used:  200    Total units discarded:  0   Post-procedure details      Chemodenervation:  Chronic migraine    Facial Nerve Location::  Bilateral facial nerve    Patient tolerance of procedure:  Tolerated well, no immediate complications   Comments        5 units orbicularis oculi bilaterally  15 units trapezius  5 units semisplenalis capitis bilaterally  10 units frontalis  All medically necessary

## 2024-04-10 DIAGNOSIS — G43.009 MIGRAINE WITHOUT AURA AND WITHOUT STATUS MIGRAINOSUS, NOT INTRACTABLE: ICD-10-CM

## 2024-04-11 RX ORDER — GALCANEZUMAB 120 MG/ML
INJECTION, SOLUTION SUBCUTANEOUS
Qty: 1 ML | Refills: 11 | Status: SHIPPED | OUTPATIENT
Start: 2024-04-11

## 2024-05-08 ENCOUNTER — OFFICE VISIT (OUTPATIENT)
Dept: GASTROENTEROLOGY | Facility: AMBULARY SURGERY CENTER | Age: 49
End: 2024-05-08
Payer: COMMERCIAL

## 2024-05-08 VITALS
DIASTOLIC BLOOD PRESSURE: 94 MMHG | OXYGEN SATURATION: 98 % | HEIGHT: 65 IN | HEART RATE: 96 BPM | WEIGHT: 216 LBS | SYSTOLIC BLOOD PRESSURE: 124 MMHG | BODY MASS INDEX: 35.99 KG/M2

## 2024-05-08 DIAGNOSIS — R14.0 BLOATING: ICD-10-CM

## 2024-05-08 DIAGNOSIS — Z12.11 SCREENING FOR COLON CANCER: ICD-10-CM

## 2024-05-08 DIAGNOSIS — K22.10 EROSIVE ESOPHAGITIS: ICD-10-CM

## 2024-05-08 DIAGNOSIS — K21.00 GASTROESOPHAGEAL REFLUX DISEASE WITH ESOPHAGITIS WITHOUT HEMORRHAGE: Primary | ICD-10-CM

## 2024-05-08 DIAGNOSIS — R10.10 UPPER ABDOMINAL PAIN: ICD-10-CM

## 2024-05-08 PROCEDURE — 99213 OFFICE O/P EST LOW 20 MIN: CPT | Performed by: PHYSICIAN ASSISTANT

## 2024-05-08 NOTE — PROGRESS NOTES
[facility] - Outpatient Progress Note  Dorothy Curtis 49 y.o. female MRN: [id number]  Encounter: [id number]    Assessment and Plan    1. Gastroesophageal reflux disease with esophagitis without hemorrhage  - Patient is currently stable on pantoprazole 40 mg twice daily.   No current daytime or night time break through symptoms.  No pain or difficulty swallowing.  No unintentional weight loss.  Also recommend:  - avoid NSAIDS  - avoid eating within 3 hours of sleeping  - elevated head of bed 4-6 inches while sleeping  - avoid trigger foods  - recommend daily supply of calcium and vitamin D    2. Erosive esophagitis  -Noted on December 18, 2023 EGD as LA grade A  -Continue pantoprazole 40 mg twice daily  - Previously on famotidine which did not help as well as prevacid 30mg daily which also did not help.    3. Upper abdominal pain  -Possibly due to erosive esophagitis    4. Bloating  -Celiac blood work is negative  -Duodenal biopsy on December 18, 2023 is negative for celiac disease  -Do lactulose breath test to rule out small intestinal bacterial overgrowth    5. Screening for colon cancer  -Last colonoscopy on December 18, 2023 with adequate bowel prep showed only scattered diverticulosis and incidental internal hemorrhoids.  -Next colonoscopy recall will be 10 years        --------------------------------------------------------------------------------------------------------------------    Chief Complaint: Follow-up to GERD, upper abdominal pain and bloating    HPI: Dorothy Curtis is a 49 y.o. female new to me with past medical history of vitamin D deficiency, chronic migraine, anxiety depression, peripheral neuropathy, myofascial pain syndrome, obstructive sleep apnea and GERD who presents today for follow up for office visit in November 2023 with Dr. Hernandez.  At that time she was reporting reflux and bloating and was in need for a screening colonoscopy.    Interval History:  Labs from  November 2023 show negative celiac panel and normal iron panel.  EGD and colonoscopy done on December 18, 2023.  EGD showed LA grade A erosive esophagitis and gastritis.  Gastric biopsy negative for H. pylori and gastric intestinal metaplasia.  Duodenal biopsy negative for celiac disease.  Colonoscopy with adequate bowel prep showed scattered diverticulosis and internal hemorrhoids.    Patient denies any nausea, vomiting, abdominal pain, dysphagia, unexpected weight loss, diarrhea, constipation, blood in stool, or black tarry stools.     Endoscopy History:  EGD - 12/18/23 showed LA grade A erosive esophagitis and gastritis.  Gastric biopsy negative for H. pylori and gastric intestinal metaplasia.  Duodenal biopsy negative for celiac disease.   Colonoscopy - 12/18/23 with adequate bowel prep showed scattered diverticulosis and internal hemorrhoids.    Review of Systems:   General: negative for fatigue, fever, night sweats or unexpected weight loss  Psychological: negative for anxiety or depression  Ophthalmic: negative for blurry vision or scleral icterus  ENT: negative for headaches, sore throat or dysphagia  Hematological and Lymphatic: negative for pallor or swollen lymph nodes  Respiratory: negative for cough, shortness of breath or wheezing  Cardiovascular: negative for chest pain, edema or murmur  Gastrointestinal: as mentioned in HPI  Genito-Urinary: negative for dysuria or incontinence  Musculoskeletal: negative for joint pain, joint stiffness or joint swelling  Dermatological: negative for pruritus, rash, or jaundice    Current Medications  Current Outpatient Medications   Medication Sig Dispense Refill   • Botulinum Toxin Type A 200 units SOLR Inject up to 200 units IM by physician into the head and neck muscles every three months 200 Units 4   • Galcanezumab-gnlm (Emgality) 120 MG/ML SOAJ INJECT 240 MG UNDER THE SKIN ONCE FOR 1 DOSE 1 mL 11   • pantoprazole (PROTONIX) 40 mg tablet TAKE 1 TABLET BY MOUTH  TWICE A  tablet 1   • rimegepant sulfate (Nurtec) 75 mg TBDP Take 1 tablet (75 mg total) by mouth as needed (migraine) Limit of 1 in 24 hours 16 tablet 6   • Iron-Vitamin C (Vitron-C)  MG TABS Take 1 pill three days a week (on Monday, Wednesday, and Friday) (Patient not taking: Reported on 5/8/2024) 30 tablet 2   • metoclopramide (Reglan) 10 mg tablet Take 1 tablet (10 mg total) by mouth 4 (four) times a day (Patient not taking: Reported on 5/8/2024) 15 tablet 3     No current facility-administered medications for this visit.       Past Medical History  Past Medical History:   Diagnosis Date   • Anxiety    • Cluster headache 9/2018    Severe mainly behind right eye   • Depression    • Headache, tension-type 2015    Increasing worse since 9/2018   • Migraine        Past Surgical History  Past Surgical History:   Procedure Laterality Date   • TUBAL LIGATION         Past Social History   Social History     Socioeconomic History   • Marital status: /Civil Union     Spouse name: None   • Number of children: None   • Years of education: None   • Highest education level: None   Occupational History   • Occupation:    Tobacco Use   • Smoking status: Never   • Smokeless tobacco: Never   Vaping Use   • Vaping status: Never Used   Substance and Sexual Activity   • Alcohol use: Yes     Alcohol/week: 2.0 standard drinks of alcohol     Types: 1 Glasses of wine, 1 Standard drinks or equivalent per week     Comment: Drink socially varying 1-2 drink per 2 wks, wine or mixed dr   • Drug use: No   • Sexual activity: Yes     Partners: Male     Birth control/protection: Female Sterilization     Comment: Tubal ligation in 2004   Other Topics Concern   • None   Social History Narrative    Caffeine use    Pt lives with daughter     Social Determinants of Health     Financial Resource Strain: Not on file   Food Insecurity: Not on file   Transportation Needs: Not on file   Physical Activity: Not on file  "  Stress: Not on file   Social Connections: Not on file   Intimate Partner Violence: Not on file   Housing Stability: Not on file       Vital Signs  Vitals:    05/08/24 1028   BP: 124/94   BP Location: Right arm   Patient Position: Sitting   Cuff Size: Standard   Pulse: 96   SpO2: 98%   Weight: 98 kg (216 lb)   Height: 5' 5\" (1.651 m)       Physical Exam:  General appearance: alert, cooperative, no distress  HEENT: normocephalic, anicteric, no eye erythema or discharge, no oropharyngeal thrush  Neck: supple  Lungs: CTA b/l, no rales, rhonchi, or wheezing, unlabored respirations  Heart: RRR, no murmur, rubs, or gallops  Abdomen: soft, non-tender, non-distended, normal bowel sounds, no masses or organomegaly  Rectal: deferred  Extremities: no cyanosis, clubbing, or edema  Musculoskeletal: normal gait  Skin: color and texture normal, no jaundice, no rashes or lesions  Psychiatric: alert and oriented, normal affect and behavior                                                          Abdoul Gaona PA-C  "

## 2024-06-20 ENCOUNTER — PROCEDURE VISIT (OUTPATIENT)
Dept: NEUROLOGY | Facility: CLINIC | Age: 49
End: 2024-06-20
Payer: COMMERCIAL

## 2024-06-20 VITALS
OXYGEN SATURATION: 98 % | SYSTOLIC BLOOD PRESSURE: 112 MMHG | DIASTOLIC BLOOD PRESSURE: 62 MMHG | TEMPERATURE: 98.7 F | HEIGHT: 65 IN | BODY MASS INDEX: 36.44 KG/M2 | HEART RATE: 78 BPM | WEIGHT: 218.7 LBS | RESPIRATION RATE: 14 BRPM

## 2024-06-20 DIAGNOSIS — G43.709 CHRONIC MIGRAINE WITHOUT AURA WITHOUT STATUS MIGRAINOSUS, NOT INTRACTABLE: Primary | ICD-10-CM

## 2024-06-20 PROCEDURE — 64615 CHEMODENERV MUSC MIGRAINE: CPT | Performed by: PHYSICIAN ASSISTANT

## 2024-06-20 NOTE — PROGRESS NOTES
"Universal Protocol   Consent: Verbal consent obtained. Written consent obtained.  Risks and benefits: risks, benefits and alternatives were discussed  Consent given by: patient  Time out: Immediately prior to procedure a \"time out\" was called to verify the correct patient, procedure, equipment, support staff and site/side marked as required.  Patient understanding: patient states understanding of the procedure being performed  Patient consent: the patient's understanding of the procedure matches consent given  Procedure consent: procedure consent matches procedure scheduled  Relevant documents: relevant documents present and verified  Patient identity confirmed: verbally with patient      Chemodenervation     Date/Time  6/20/2024 9:00 AM     Performed by  Delphine Herrera PA-C   Authorized by  Delphine Herrera PA-C     Pre-procedure details      Preparation: Patient was prepped and draped in usual sterile fashion     Anesthesia  (see MAR for exact dosages):     Anesthesia method:  None   Procedure details      Position:  Upright   Botox      Botox Type:  Type A    Brand:  Botox    mL's of Botulinum Toxin:  200    mL's of preservative free sterile saline:  4    Final Concentration per CC:  50 units    Needle Gauge:  30 G 2.5 inch   Procedures      Botox Procedures: chronic headache     Injection Location      Head / Face:  L superior cervical paraspinal, R superior cervical paraspinal, L , R , R frontalis, L frontalis, R medial occipitalis, L medial occipitalis, procerus, R temporalis, L superior trapezius, R superior trapezius and L temporalis    L  injection amount:  5 unit(s)    R  injection amount:  5 unit(s)    L lateral frontalis:  5 unit(s)    R lateral frontalis:  5 unit(s)    L medial frontalis:  5 unit(s)    R medial frontalis:  5 unit(s)    L temporalis injection amount:  20 unit(s)    R temporalis injection amount:  20 unit(s)    Procerus injection amount:  5 unit(s)    L " medial occipitalis injection amount:  15 unit(s)    R medial occipitalis injection amount:  15 unit(s)    L superior cervical paraspinal injection amount:  10 unit(s)    R superior cervical paraspinal injection amount:  10 unit(s)    L superior trapezius injection amount:  15 unit(s)    R superior trapezius injection amount:  15 unit(s)   Total Units      Total units used:  200   Post-procedure details      Chemodenervation:  Chronic migraine    Facial Nerve Location::  Bilateral facial nerve    Patient tolerance of procedure:  Tolerated well, no immediate complications   Comments        5 units orbicularis oculi bilaterally  15 units trapezius  5 units semisplenalis capitis bilaterally  10 units frontalis  All medically necessary

## 2024-07-29 ENCOUNTER — APPOINTMENT (OUTPATIENT)
Dept: LAB | Facility: CLINIC | Age: 49
End: 2024-07-29

## 2024-07-29 DIAGNOSIS — Z00.8 HEALTH EXAMINATION IN POPULATION SURVEY: ICD-10-CM

## 2024-07-29 LAB
CHOLEST SERPL-MCNC: 245 MG/DL
EST. AVERAGE GLUCOSE BLD GHB EST-MCNC: 120 MG/DL
HBA1C MFR BLD: 5.8 %
HDLC SERPL-MCNC: 36 MG/DL
LDLC SERPL CALC-MCNC: 134 MG/DL (ref 0–100)
NONHDLC SERPL-MCNC: 209 MG/DL
TRIGL SERPL-MCNC: 377 MG/DL

## 2024-07-29 PROCEDURE — 83036 HEMOGLOBIN GLYCOSYLATED A1C: CPT

## 2024-07-29 PROCEDURE — 80061 LIPID PANEL: CPT

## 2024-07-29 PROCEDURE — 36415 COLL VENOUS BLD VENIPUNCTURE: CPT

## 2024-08-01 DIAGNOSIS — Z00.6 ENCOUNTER FOR EXAMINATION FOR NORMAL COMPARISON OR CONTROL IN CLINICAL RESEARCH PROGRAM: ICD-10-CM

## 2024-08-08 NOTE — ANESTHESIA POSTPROCEDURE EVALUATION
Post-Op Assessment Note    CV Status:  Stable  Pain Score: 0    Pain management: adequate       Mental Status:  Awake   Hydration Status:  Stable and euvolemic   PONV Controlled:  None   Airway Patency:  Patent     Post Op Vitals Reviewed: Yes      Staff: CRNA               BP   110/78   Temp      Pulse 83   Resp 16   99         [FreeTextEntry1] : patient presents for 1 month follow up  [de-identified] : patient presents for 1 month follow up for dizziness, covid and oral thrush she is feeling better dizziness resolved but she still has an ent appt in 2 weeks she used nystatin swish and swallow for 1 week ct abd showed fatty liver she saw gi and was told to take probiotics diarrhea resolved   Duration Of Freeze Thaw-Cycle (Seconds): 0 Render In Bullet Format When Appropriate: No Total Number Of Aks Treated: 15 Detail Level: Zone Consent: The patient's consent was obtained including but not limited to risks of crusting, scabbing, blistering, scarring, darker or lighter pigmentary change, recurrence, incomplete removal and infection. Post-Care Instructions: I reviewed with the patient in detail post-care instructions. Patient is to wear sunprotection, and avoid picking at any of the treated lesions. Pt may apply Vaseline to crusted or scabbing areas.

## 2024-08-23 ENCOUNTER — TELEPHONE (OUTPATIENT)
Age: 49
End: 2024-08-23

## 2024-08-23 NOTE — TELEPHONE ENCOUNTER
Reason for call:   [] Refill   [x] Prior Auth  [] Other: Patient stated she spoke with [facility] on 8/1/2024 and was told a request was sent to the Provider, as well needing new authorization. 8/23/2024 she spoke with the pharmacy and was told again authorization is needed despite refills remaining. Patient states she has one tablet left.    Office:   [] PCP/Provider -   [x] Specialty/Provider - [facility] - Delphine Herrera PA-C     Medication:  rimegepant sulfate (Nurtec) 75 mg TBDP    Dose/Frequency: Take 1 tablet (75 mg total) by mouth as needed (migraine) Limit of 1 in 24 hours     Quantity: 16 tablet     Pharmacy: [facility]ACY #[id number] [address] [[id number]]     Does the patient have enough for 3 days?   [] Yes   [x] No - Send as HP to POD

## 2024-08-26 NOTE — TELEPHONE ENCOUNTER
PA approved through 8/23/25    Called [facility] pharmacy and left a detailed message on their answering machine of the approval and  for a call back if any questions.

## 2024-08-27 ENCOUNTER — TELEPHONE (OUTPATIENT)
Age: 49
End: 2024-08-27

## 2024-08-27 DIAGNOSIS — G43.009 MIGRAINE WITHOUT AURA AND WITHOUT STATUS MIGRAINOSUS, NOT INTRACTABLE: ICD-10-CM

## 2024-08-27 RX ORDER — GALCANEZUMAB 120 MG/ML
INJECTION, SOLUTION SUBCUTANEOUS
Qty: 1 ML | Refills: 0 | Status: CANCELLED | OUTPATIENT
Start: 2024-08-27

## 2024-08-27 NOTE — TELEPHONE ENCOUNTER
Reason for call:   [x] Refill   [] Prior Auth  [] Other:     Office:   [] PCP/Provider -   [x] Specialty/Provider - Neuro        Does the patient have enough for 3 days?   [x] Yes   [] No - Send as HP to POD

## 2024-08-27 NOTE — TELEPHONE ENCOUNTER
See refill enc    Per enc 24-Emgality PA  24    Urgent PA initiated on CMM. (Key: [id number])    Awaiting determination

## 2024-08-29 NOTE — TELEPHONE ENCOUNTER
PA approved through 8/27/25    Called [facility] pharmacy and left a detailed message on their answering machine of the approval and for a call back if any questions.

## 2024-09-19 ENCOUNTER — PROCEDURE VISIT (OUTPATIENT)
Dept: NEUROLOGY | Facility: CLINIC | Age: 49
End: 2024-09-19
Payer: COMMERCIAL

## 2024-09-19 VITALS — SYSTOLIC BLOOD PRESSURE: 111 MMHG | TEMPERATURE: 97.7 F | DIASTOLIC BLOOD PRESSURE: 72 MMHG | HEART RATE: 84 BPM

## 2024-09-19 DIAGNOSIS — G43.709 CHRONIC MIGRAINE WITHOUT AURA WITHOUT STATUS MIGRAINOSUS, NOT INTRACTABLE: Primary | ICD-10-CM

## 2024-09-19 PROCEDURE — 64615 CHEMODENERV MUSC MIGRAINE: CPT | Performed by: PHYSICIAN ASSISTANT

## 2024-09-19 NOTE — PROGRESS NOTES
"Universal Protocol   procedure performed by consultantConsent: Verbal consent obtained. Written consent obtained.  Risks and benefits: risks, benefits and alternatives were discussed  Consent given by: patient  Time out: Immediately prior to procedure a \"time out\" was called to verify the correct patient, procedure, equipment, support staff and site/side marked as required.  Patient understanding: patient states understanding of the procedure being performed  Patient consent: the patient's understanding of the procedure matches consent given  Procedure consent: procedure consent matches procedure scheduled  Relevant documents: relevant documents present and verified  Patient identity confirmed: verbally with patient      Chemodenervation     Date/Time  9/19/2024 9:00 AM     Performed by  Delphine Herrera PA-C   Authorized by  Delphine Herrera PA-C     Pre-procedure details      Preparation: Patient was prepped and draped in usual sterile fashion     Anesthesia  (see MAR for exact dosages):     Anesthesia method:  None   Procedure details      Position:  Upright   Botox      Botox Type:  Type A    Brand:  Botox    mL's of Botulinum Toxin:  200    mL's of preservative free sterile saline:  4    Final Concentration per CC:  50 units    Needle Gauge:  30 G 2.5 inch   Procedures      Botox Procedures: chronic headache     Injection Location      Head / Face:  L superior cervical paraspinal, R superior cervical paraspinal, L , R , R frontalis, L frontalis, R medial occipitalis, L medial occipitalis, procerus, R temporalis, L superior trapezius, R superior trapezius and L temporalis    L  injection amount:  5 unit(s)    R  injection amount:  5 unit(s)    L lateral frontalis:  5 unit(s)    R lateral frontalis:  5 unit(s)    L medial frontalis:  5 unit(s)    R medial frontalis:  5 unit(s)    L temporalis injection amount:  20 unit(s)    R temporalis injection amount:  20 unit(s)    Procerus " injection amount:  5 unit(s)    L medial occipitalis injection amount:  15 unit(s)    R medial occipitalis injection amount:  15 unit(s)    L superior cervical paraspinal injection amount:  10 unit(s)    R superior cervical paraspinal injection amount:  10 unit(s)    L superior trapezius injection amount:  15 unit(s)    R superior trapezius injection amount:  15 unit(s)   Total Units      Total units used:  200   Post-procedure details      Chemodenervation:  Chronic migraine    Facial Nerve Location::  Bilateral facial nerve    Patient tolerance of procedure:  Tolerated well, no immediate complications   Comments        5 units orbicularis oculi bilaterally  15 units trapezius  5 units semisplenalis capitis bilaterally  10 units frontalis  All medically necessary

## 2024-09-23 ENCOUNTER — OFFICE VISIT (OUTPATIENT)
Dept: GASTROENTEROLOGY | Facility: AMBULARY SURGERY CENTER | Age: 49
End: 2024-09-23
Payer: COMMERCIAL

## 2024-09-23 VITALS
SYSTOLIC BLOOD PRESSURE: 150 MMHG | BODY MASS INDEX: 32.55 KG/M2 | OXYGEN SATURATION: 99 % | HEART RATE: 95 BPM | WEIGHT: 195.4 LBS | HEIGHT: 65 IN | DIASTOLIC BLOOD PRESSURE: 100 MMHG

## 2024-09-23 DIAGNOSIS — R14.0 BLOATING: ICD-10-CM

## 2024-09-23 DIAGNOSIS — R10.13 EPIGASTRIC PAIN: ICD-10-CM

## 2024-09-23 DIAGNOSIS — Z12.11 SCREENING FOR COLON CANCER: ICD-10-CM

## 2024-09-23 DIAGNOSIS — K22.10 EROSIVE ESOPHAGITIS: ICD-10-CM

## 2024-09-23 DIAGNOSIS — K21.00 GASTROESOPHAGEAL REFLUX DISEASE WITH ESOPHAGITIS WITHOUT HEMORRHAGE: Primary | ICD-10-CM

## 2024-09-23 PROCEDURE — 99213 OFFICE O/P EST LOW 20 MIN: CPT | Performed by: PHYSICIAN ASSISTANT

## 2024-09-23 RX ORDER — TIRZEPATIDE 5 MG/.5ML
INJECTION, SOLUTION SUBCUTANEOUS
COMMUNITY
Start: 2024-09-05

## 2024-09-23 NOTE — ASSESSMENT & PLAN NOTE
- Patient is currently stable on pantoprazole 40 mg once daily (no longer needing to take it twice daily).   No current daytime or night time break through symptoms.  No pain or difficulty swallowing.  No unintentional weight loss.  -Symptoms improved with change in diet and less frequent alcohol  Also recommend:  - avoid NSAIDS  - avoid eating within 3 hours of sleeping  - elevated head of bed 4-6 inches while sleeping  - avoid trigger foods  - recommend daily supply of calcium and vitamin D     Multiple pressure ulcers as above  -wound care

## 2024-09-23 NOTE — ASSESSMENT & PLAN NOTE
-Noted on December 18, 2023 EGD as LA grade A  -Continue pantoprazole 40 mg twice daily  - Previously on famotidine which did not help as well as prevacid 30mg daily which also did not help.

## 2024-09-23 NOTE — ASSESSMENT & PLAN NOTE
-Celiac blood work is negative  -Duodenal biopsy on December 18, 2023 is negative for celiac disease  -Do lactulose breath test to rule out small intestinal bacterial overgrowth  -Symptoms resolved at this time

## 2024-09-23 NOTE — PROGRESS NOTES
Ambulatory Visit  Name: Dorothy Curtis      : 1975      MRN: [id number]  Encounter Provider: Abdoul Gaona PA-C  Encounter Date: 2024   Encounter department: [facility] [place]    Assessment & Plan  Gastroesophageal reflux disease with esophagitis without hemorrhage  - Patient is currently stable on pantoprazole 40 mg once daily (no longer needing to take it twice daily).   No current daytime or night time break through symptoms.  No pain or difficulty swallowing.  No unintentional weight loss.  -Symptoms improved with change in diet and less frequent alcohol  Also recommend:  - avoid NSAIDS  - avoid eating within 3 hours of sleeping  - elevated head of bed 4-6 inches while sleeping  - avoid trigger foods  - recommend daily supply of calcium and vitamin D    Erosive esophagitis  -Noted on 2023 EGD as LA grade A  -Continue pantoprazole 40 mg twice daily  - Previously on famotidine which did not help as well as prevacid 30mg daily which also did not help.    Epigastric pain  -Question from erosive esophagitis  -Symptoms resolved at this time    Bloating  -Celiac blood work is negative  -Duodenal biopsy on 2023 is negative for celiac disease  -Do lactulose breath test to rule out small intestinal bacterial overgrowth  -Symptoms resolved at this time    Screening for colon cancer  -Last colonoscopy on 2023 with adequate bowel prep showed only scattered diverticulosis and incidental internal hemorrhoids.  -Next colonoscopy recall will be 10 years      History of Present Illness     Dorothy Curtis is a 49 y.o. female with past medical history of vitamin D deficiency, chronic migraine, anxiety depression, peripheral neuropathy, myofascial pain syndrome, obstructive sleep apnea and GERD who presents today for follow up for office visit in 2023 with Dr. Hernandez.  At that time she was reporting reflux and bloating and was in  need for a screening colonoscopy.     Interval History:  Labs from November 2023 show negative celiac panel and normal iron panel.  EGD and colonoscopy done on December 18, 2023.  EGD showed LA grade A erosive esophagitis and gastritis.  Gastric biopsy negative for H. pylori and gastric intestinal metaplasia.  Duodenal biopsy negative for celiac disease.  Colonoscopy with adequate bowel prep showed scattered diverticulosis and internal hemorrhoids.  She has noted overall that with a change in diet and recent weight loss after starting Zepbound that this has greatly reduced and eliminated her symptoms.  She is able to get down to once a day on her pantoprazole 40 mg daily.  Her bowels are moving no black or bloody stool no nausea vomiting and bloating has resolved.     Endoscopy History:  EGD - 12/18/23 showed LA grade A erosive esophagitis and gastritis.  Gastric biopsy negative for H. pylori and gastric intestinal metaplasia.  Duodenal biopsy negative for celiac disease.   Colonoscopy - 12/18/23 with adequate bowel prep showed scattered diverticulosis and internal hemorrhoids.      Review of Systems   Constitutional:  Negative for fever.   Gastrointestinal:  Positive for abdominal pain. Negative for constipation, diarrhea, nausea and vomiting.   Genitourinary:  Negative for dysuria, frequency and hematuria.   Musculoskeletal:  Negative for arthralgias and myalgias.   Neurological:  Negative for headaches.     As per HPI, 12 point review of systems reviewed and otherwise negative        Objective     There were no vitals taken for this visit.    Physical Exam  Constitutional:       Appearance: Normal appearance.   HENT:      Head: Normocephalic and atraumatic.   Eyes:      General: No scleral icterus.  Cardiovascular:      Rate and Rhythm: Normal rate.   Pulmonary:      Effort: Pulmonary effort is normal. No respiratory distress.      Breath sounds: Normal breath sounds.   Abdominal:      General: Abdomen is flat.  Bowel sounds are normal. There is no distension.      Palpations: Abdomen is soft.      Tenderness: There is no abdominal tenderness. There is no guarding.   Skin:     General: Skin is warm and dry.   Neurological:      Mental Status: She is alert and oriented to person, place, and time.   Psychiatric:         Mood and Affect: Mood normal.         Behavior: Behavior normal.

## 2024-10-15 DIAGNOSIS — K21.00 GASTROESOPHAGEAL REFLUX DISEASE WITH ESOPHAGITIS WITHOUT HEMORRHAGE: ICD-10-CM

## 2024-10-15 RX ORDER — PANTOPRAZOLE SODIUM 40 MG/1
40 TABLET, DELAYED RELEASE ORAL 2 TIMES DAILY
Qty: 60 TABLET | Refills: 5 | Status: SHIPPED | OUTPATIENT
Start: 2024-10-15

## 2024-11-14 DIAGNOSIS — K21.00 GASTROESOPHAGEAL REFLUX DISEASE WITH ESOPHAGITIS WITHOUT HEMORRHAGE: ICD-10-CM

## 2024-11-14 RX ORDER — PANTOPRAZOLE SODIUM 40 MG/1
40 TABLET, DELAYED RELEASE ORAL 2 TIMES DAILY
Qty: 180 TABLET | Refills: 1 | Status: SHIPPED | OUTPATIENT
Start: 2024-11-14

## 2024-12-19 ENCOUNTER — PROCEDURE VISIT (OUTPATIENT)
Dept: NEUROLOGY | Facility: CLINIC | Age: 49
End: 2024-12-19
Payer: COMMERCIAL

## 2024-12-19 VITALS — TEMPERATURE: 97.9 F | HEART RATE: 78 BPM | DIASTOLIC BLOOD PRESSURE: 67 MMHG | SYSTOLIC BLOOD PRESSURE: 110 MMHG

## 2024-12-19 DIAGNOSIS — G43.709 CHRONIC MIGRAINE WITHOUT AURA WITHOUT STATUS MIGRAINOSUS, NOT INTRACTABLE: Primary | ICD-10-CM

## 2024-12-19 PROCEDURE — 64615 CHEMODENERV MUSC MIGRAINE: CPT | Performed by: PHYSICIAN ASSISTANT

## 2024-12-19 NOTE — PROGRESS NOTES
"Universal Protocol   procedure performed by consultantConsent: Verbal consent obtained. Written consent obtained.  Risks and benefits: risks, benefits and alternatives were discussed  Consent given by: patient  Time out: Immediately prior to procedure a \"time out\" was called to verify the correct patient, procedure, equipment, support staff and site/side marked as required.  Patient understanding: patient states understanding of the procedure being performed  Patient consent: the patient's understanding of the procedure matches consent given  Procedure consent: procedure consent matches procedure scheduled  Relevant documents: relevant documents present and verified  Patient identity confirmed: verbally with patient      Chemodenervation     Date/Time  12/19/2024 9:00 AM     Performed by  Delphine Herrera PA-C   Authorized by  Delphine Herrera PA-C     Pre-procedure details      Preparation: Patient was prepped and draped in usual sterile fashion     Anesthesia  (see MAR for exact dosages):     Anesthesia method:  None   Procedure details      Position:  Upright   Botox      Botox Type:  Type A    Brand:  Botox    mL's of Botulinum Toxin:  200    mL's of preservative free sterile saline:  4    Final Concentration per CC:  50 units    Needle Gauge:  30 G 2.5 inch   Procedures      Botox Procedures: chronic headache     Injection Location      Head / Face:  L superior cervical paraspinal, R superior cervical paraspinal, L , R , R frontalis, L frontalis, R medial occipitalis, L medial occipitalis, procerus, R temporalis, L superior trapezius, R superior trapezius and L temporalis    L  injection amount:  5 unit(s)    R  injection amount:  5 unit(s)    L lateral frontalis:  5 unit(s)    R lateral frontalis:  5 unit(s)    L medial frontalis:  5 unit(s)    R medial frontalis:  5 unit(s)    L temporalis injection amount:  20 unit(s)    R temporalis injection amount:  20 unit(s)    Procerus " injection amount:  5 unit(s)    L medial occipitalis injection amount:  15 unit(s)    R medial occipitalis injection amount:  15 unit(s)    L superior cervical paraspinal injection amount:  10 unit(s)    R superior cervical paraspinal injection amount:  10 unit(s)    L superior trapezius injection amount:  15 unit(s)    R superior trapezius injection amount:  15 unit(s)   Total Units      Total units used:  200   Post-procedure details      Chemodenervation:  Chronic migraine    Facial Nerve Location::  Bilateral facial nerve    Patient tolerance of procedure:  Tolerated well, no immediate complications   Comments        5 units orbicularis oculi bilaterally  15 units trapezius  5 units semisplenalis capitis bilaterally  10 units frontalis  All medically necessary

## 2025-01-15 ENCOUNTER — OFFICE VISIT (OUTPATIENT)
Dept: NEUROLOGY | Facility: CLINIC | Age: 50
End: 2025-01-15
Payer: COMMERCIAL

## 2025-01-15 VITALS
BODY MASS INDEX: 29.2 KG/M2 | WEIGHT: 175.3 LBS | TEMPERATURE: 97.5 F | HEIGHT: 65 IN | HEART RATE: 88 BPM | SYSTOLIC BLOOD PRESSURE: 120 MMHG | DIASTOLIC BLOOD PRESSURE: 77 MMHG

## 2025-01-15 DIAGNOSIS — G43.009 MIGRAINE WITHOUT AURA AND WITHOUT STATUS MIGRAINOSUS, NOT INTRACTABLE: ICD-10-CM

## 2025-01-15 DIAGNOSIS — R20.0 NUMBNESS AND TINGLING: ICD-10-CM

## 2025-01-15 DIAGNOSIS — R20.2 NUMBNESS AND TINGLING: ICD-10-CM

## 2025-01-15 DIAGNOSIS — R29.898 WEAKNESS OF BOTH ARMS: ICD-10-CM

## 2025-01-15 DIAGNOSIS — G24.3 CERVICAL DYSTONIA: ICD-10-CM

## 2025-01-15 DIAGNOSIS — G43.709 CHRONIC MIGRAINE WITHOUT AURA WITHOUT STATUS MIGRAINOSUS, NOT INTRACTABLE: Primary | ICD-10-CM

## 2025-01-15 PROCEDURE — 99215 OFFICE O/P EST HI 40 MIN: CPT | Performed by: PHYSICIAN ASSISTANT

## 2025-01-15 RX ORDER — RIMEGEPANT SULFATE 75 MG/75MG
75 TABLET, ORALLY DISINTEGRATING ORAL AS NEEDED
Qty: 16 TABLET | Refills: 6 | Status: SHIPPED | OUTPATIENT
Start: 2025-01-15

## 2025-01-15 NOTE — PROGRESS NOTES
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       Chronic migraine headaches:   What medications do you take or have you taken for your headaches?   Current Preventative:  Botox  Emgality     Current Abortive:  Decadron  Nurtec     Prior PREVENTATIVE:  melatonin, multivitamin, B12, biotin, vitamin-D  Depakote, gabapentin  amitriptyline (gain weight on it) venlafaxine, cymbalta  Robaxin, tizanidine, Baclofen,  Nurtec     Prior ABORTIVE:  methylprednisolone,   ketorolac, diclofenac  Fioricet (stopped in 2015)  Depakote, Gabapentin (feels like been out drinking), Olanzapine  prochlorperazine  Excedrin, Aleve, ibuprofen  DHE  Sumatriptan, Rizatriptan     Headache are worse if the patient: cough, sneeze, bending over  Headache triggers:  Not sure     Aura/Warning and how long does it last - blurry vision - within half an hour     What is your current pain level - 2-3/10     How often do the headaches occur?   mild headache: 3-4 a week; prior to Botox was daily  Moderate/Severe headaches 3-7 a month; prior to Botox was daily     What time of the day do the headaches start?   mild pain: varies  Severe headache: varies     How long do the headaches last?   mild headache:  6-8 hours  Moderate/Severe headache: a few hours to rest of the days; prior 1-2 days      Are you ever headache free? yes     Describe your usual headache -   mild pain: Throbbing  Severe pain: Stabbing     Where is your headache located?   mild headache: anywhere in the head (usually temple to across forehead)  Severe headache: right frontal and parietal region     What is the intensity of pain?   mild headaches:  3-8/10  Moderate/severe headaches: 7-8/10      Associated symptoms:   Decrease of appetite, nausea, vomiting, diarrhea  Insomnia  Photophobia, phonophobia, sensitivity to smell   Problem with concentration  Blurred vision,   Pupil dilated on the right  Hands or feet tingle or feel numb  flushing of face  Lacrimation, runny or stuffed-up nose,    light-headed or dizzy, stiff or sore neck  prefer to be alone and in a dark room, unable to work     Number of days missed per month because of headaches:  Work (or school) days: once in past 6 momthsn  Social or Family activities:  none, was often before Botox     What time of the year do headaches occur more frequently? Not sure  Have you seen someone else for headaches or pain? Yes  Have you had trigger point injection performed and how often? No  Have you had Botox injection performed and how often? Yes  Have you had epidural injections or transforaminal injections performed? Yes     EM2018:   This is an abnormal study.   There is electrophysiological evidence consistent with an active subacute to chronic left C5/C6 radiculopathy.   There was no electrophysiological evidence of median or ulnar entrapment neuropathy, or brachial plexopathy in the left upper extremity      Sleep Habit  Is your sleep restful? yes  How often do you get up at night? maybe once; previous was 3-5 from her animals   Do you wake up with headaches? yes sometimes  Do you snore while asleep? no  Have you been told that you stop breathing during sleeping?    no  Do you wake up tired in the morning? no  Do you take frequent naps during the day? no  Do you have jaw pain? yes  Do you grind/clench your teeth at night? yes  Do you have restless leg syndrome? no  Do you have nightmare or sleep walk? Yes, has significant sleep terrors which started in her 20s.  Wakes her  up screaming while she is still asleep.  When she takes anything to sleep, even melatonin this happens. She otherwise wakes up 3-5 times a night     Alternative therapies used in the past for headaches? physical therapy  Have you used CBD or THC for your headaches and how often? No  Are you current pregnant or planning on getting pregnant? No  Have you ever had any Brain imaging? yes        I personally reviewed these images.     With botox has had a reduction of  at least 7 migraine days with less abortive medication, less ER visits which correlates to headache diary     Reviewed old notes from physician seen in the past- see above HPI for summary of previous encounters.     . .

## 2025-01-15 NOTE — ASSESSMENT & PLAN NOTE
Due to new onset of numbness, tingling and weakness in her arms, we will proceed with MRI of the cervical spine to assess for any nefarious structural abnormalities which could be causing this

## 2025-01-15 NOTE — PROGRESS NOTES
Neurology Ambulatory Visit  Name: Dorothy Curtis       : 1975       MRN: [id number]   Encounter Provider: Delphine Herrera PA-C   Encounter Date: 1/15/2025  Encounter department: NEUROLOGY [facility]    Assessment and Plan  1. Chronic migraine without aura without status migrainosus, not intractable  Assessment & Plan:  Preventive therapy for headaches:   Continue botox every 3 month but will increase to 300 units  Emgality 1 injection every 28 days    Abortive therapy for headaches:   At the onset of a severe headache take Nurtec 75 mg.  Limit of 1 in 24 hours.    May use metoclopramide 10 mg every 8 hours as needed  If that fails, use ibuprofen and Benadryl 50 mg.  Orders:  -     Botulinum Toxin Type A SOLR 300 Units  2. Cervical dystonia  Assessment & Plan:  Will add an additional 100 units to the 200 units to treat for chronic migraine as well as cervical dystonia to improve her health and decrease her head tilt and improve ROM  Orders:  -     Botulinum Toxin Type A SOLR 300 Units  3. Weakness of both arms  Assessment & Plan:  Due to new onset of numbness, tingling and weakness in her arms, we will proceed with MRI of the cervical spine to assess for any nefarious structural abnormalities which could be causing this     Orders:  -     MRI cervical spine with and without contrast; Future; Expected date: 01/15/2025  -     BUN; Future  -     Creatinine, serum; Future  4. Migraine without aura and without status migrainosus, not intractable  -     rimegepant sulfate (Nurtec) 75 mg TBDP; Take 1 tablet (75 mg total) by mouth as needed (migraine) Limit of 1 in 24 hours  5. Numbness and tingling  Assessment & Plan:  Due to new onset of numbness, tingling and weakness in her arms, we will proceed with MRI of the cervical spine to assess for any nefarious structural abnormalities which could be causing this  Orders:  -     MRI cervical spine with and without contrast; Future; Expected date:  01/15/2025  -     BUN; Future  -     Creatinine, serum; Future    She will No follow-ups on file.    History of Present Illness     HPI   Dorothy Curtis is a 49 y.o. female with vitamin D deficiency, anxiety, depression, GERD and erosive esophagitis, who is returning to Neurology office for follow up of her headaches  .  She works at "[employer] in [employer] control.      Patient states that her neck, back and spine have worsened and have been worsening her headaches..  Patient has constant pressure and head wants to move in an inappropriate way and has a constant dull pain in her neck and back.  Her headaches worsened over the past November 2024 and had a bad episode around new years for multiple     Chronic migraine headaches:   What medications do you take or have you taken for your headaches?   Current Preventative:  Botox  Emgality     Current Abortive:  Decadron  Nurtec     Prior PREVENTATIVE:  melatonin, multivitamin, B12, biotin, vitamin-D  Depakote, gabapentin  amitriptyline (gain weight on it) venlafaxine, cymbalta  Robaxin, tizanidine, Baclofen,  Nurtec     Prior ABORTIVE:  methylprednisolone,   ketorolac, diclofenac  Fioricet (stopped in 2015)  Depakote, Gabapentin (feels like been out drinking), Olanzapine  prochlorperazine  Excedrin, Aleve, ibuprofen  DHE  Sumatriptan, Rizatriptan     Headache are worse if the patient: cough, sneeze, bending over  Headache triggers:  Not sure     Aura/Warning and how long does it last - blurry vision - within half an hour     What is your current pain level - 0/10     How often do the headaches occur?   mild headache: 2 a week; prior to Botox was daily  Moderate/Severe headaches 3-7 a month; prior to Botox was daily     What time of the day do the headaches start?   mild pain: varies  Severe headache: varies     How long do the headaches last?   mild headache:  6-8 hours  Moderate/Severe headache: a few hours to rest of the day; prior 1-2 days      Are you ever headache free?  yes     Describe your usual headache -   mild pain: Throbbing  Severe pain: Stabbing     Where is your headache located?   mild headache: anywhere in the head (usually temple to across forehead)  Severe headache: right frontal and parietal region     What is the intensity of pain?   mild headaches:  3-4/10  Moderate/severe headaches: 7-10/10      Associated symptoms:   Decrease of appetite, nausea, vomiting, diarrhea  Insomnia  Photophobia, phonophobia, sensitivity to smell   Problem with concentration  Blurred vision,   Pupil dilated on the right  Hands or feet tingle or feel numb  flushing of face  Lacrimation, runny or stuffed-up nose,   light-headed or dizzy, stiff or sore neck  prefer to be alone and in a dark room, unable to work     Number of days missed per month because of headaches:  Work (or school) days: did miss 1.5 days the beginning of 2025  Social or Family activities:  none, was often before Botox     What time of the year do headaches occur more frequently? Not sure  Have you seen someone else for headaches or pain? Yes  Have you had trigger point injection performed and how often? No  Have you had Botox injection performed and how often? Yes  Have you had epidural injections or transforaminal injections performed? Yes     EM2018:   This is an abnormal study.   There is electrophysiological evidence consistent with an active subacute to chronic left C5/C6 radiculopathy.   There was no electrophysiological evidence of median or ulnar entrapment neuropathy, or brachial plexopathy in the left upper extremity      Sleep Habit  Is your sleep restful? yes  How often do you get up at night? maybe once; previous was 3-5 from her animals   Do you wake up with headaches? yes sometimes  Do you snore while asleep? no  Have you been told that you stop breathing during sleeping?    no  Do you wake up tired in the morning? no  Do you take frequent naps during the day? no  Do you have jaw pain?  "yes  Do you grind/clench your teeth at night? yes  Do you have restless leg syndrome? no  Do you have nightmare or sleep walk? Yes, has significant sleep terrors which started in her 20s.  Wakes her  up screaming while she is still asleep.  When she takes anything to sleep, even melatonin this happens. She otherwise wakes up 3-5 times a night     Alternative therapies used in the past for headaches? physical therapy  Have you used CBD or THC for your headaches and how often? No  Are you current pregnant or planning on getting pregnant? No  Have you ever had any Brain imaging? yes        I personally reviewed these images.     With botox has had a reduction of at least 7 migraine days with less abortive medication, less ER visits which correlates to headache diary     Reviewed old notes from physician seen in the past- see above HPI for summary of previous encounters.     . .     I reviewed her chart, as documented in [employer], and summarized above.     Review of Systems    I personally reviewed the ROS that was entered by the medical assistant    Objective     /77 (BP Location: Left arm, Patient Position: Sitting, Cuff Size: Standard)   Pulse 88   Temp 97.5 °F (36.4 °C) (Temporal)   Ht 5' 5\" (1.651 m)   Wt 79.5 kg (175 lb 4.8 oz)   BMI 29.17 kg/m²      CONSTITUTIONAL: Well developed, well nourished, well groomed. No dysmorphic features.     HEENT:  Normocephalic atraumatic.  Head tilt to the left   Chest:  Respirations regular and unlabored.    Psychiatric:  Normal behavior and appropriate affect      SKULL AND SPINE  ROM: Full range of motion    MENTAL STATUS  Orientation: Alert and oriented x 3  Fund of knowledge: Intact.      Administrative Statements   I have spent a total time of 40 minutes in caring for this patient on the day of the visit/encounter including Diagnostic results, Prognosis, Risks and benefits of tx options, Instructions for management, Patient and family education, " Importance of tx compliance, Risk factor reductions, Impressions, Counseling / Coordination of care, Documenting in the medical record, Reviewing / ordering tests, medicine, procedures  , and Obtaining or reviewing history  .      Voice recognition software was used in the generation of this note. There may be unintentional errors including grammatical errors, spelling errors, or pronoun errors.

## 2025-01-15 NOTE — ASSESSMENT & PLAN NOTE
Preventive therapy for headaches:   Continue botox every 3 month but will increase to 300 units  Emgality 1 injection every 28 days    Abortive therapy for headaches:   At the onset of a severe headache take Nurtec 75 mg.  Limit of 1 in 24 hours.    May use metoclopramide 10 mg every 8 hours as needed  If that fails, use ibuprofen and Benadryl 50 mg.

## 2025-01-15 NOTE — ASSESSMENT & PLAN NOTE
Will add an additional 100 units to the 200 units to treat for chronic migraine as well as cervical dystonia to improve her health and decrease her head tilt and improve ROM

## 2025-01-21 ENCOUNTER — TELEPHONE (OUTPATIENT)
Age: 50
End: 2025-01-21

## 2025-01-21 NOTE — TELEPHONE ENCOUNTER
Clarisse called from [facility] stating this pts BOTOX has been denied. The denial letter was faxed. A letter of denial will be sent to the pt tonight.      If you have any questions please call Clarisse at:    [phone] ext [phone]

## 2025-01-24 ENCOUNTER — HOSPITAL ENCOUNTER (OUTPATIENT)
Dept: RADIOLOGY | Facility: IMAGING CENTER | Age: 50
End: 2025-01-24
Payer: COMMERCIAL

## 2025-01-24 VITALS — HEIGHT: 65 IN | BODY MASS INDEX: 28.32 KG/M2 | WEIGHT: 170 LBS

## 2025-01-24 DIAGNOSIS — Z12.31 ENCOUNTER FOR SCREENING MAMMOGRAM FOR MALIGNANT NEOPLASM OF BREAST: ICD-10-CM

## 2025-01-24 PROCEDURE — 77063 BREAST TOMOSYNTHESIS BI: CPT

## 2025-01-24 PROCEDURE — 77067 SCR MAMMO BI INCL CAD: CPT

## 2025-02-06 ENCOUNTER — APPOINTMENT (OUTPATIENT)
Dept: LAB | Facility: CLINIC | Age: 50
End: 2025-02-06
Payer: COMMERCIAL

## 2025-02-06 DIAGNOSIS — R20.2 NUMBNESS AND TINGLING: ICD-10-CM

## 2025-02-06 DIAGNOSIS — R20.0 NUMBNESS AND TINGLING: ICD-10-CM

## 2025-02-06 DIAGNOSIS — Z00.6 ENCOUNTER FOR EXAMINATION FOR NORMAL COMPARISON OR CONTROL IN CLINICAL RESEARCH PROGRAM: ICD-10-CM

## 2025-02-06 DIAGNOSIS — R29.898 WEAKNESS OF BOTH ARMS: ICD-10-CM

## 2025-02-06 LAB
BUN SERPL-MCNC: 10 MG/DL (ref 5–25)
CREAT SERPL-MCNC: 0.76 MG/DL (ref 0.6–1.3)
GFR SERPL CREATININE-BSD FRML MDRD: 93 ML/MIN/1.73SQ M

## 2025-02-06 PROCEDURE — 82565 ASSAY OF CREATININE: CPT

## 2025-02-06 PROCEDURE — 36415 COLL VENOUS BLD VENIPUNCTURE: CPT

## 2025-02-06 PROCEDURE — 84520 ASSAY OF UREA NITROGEN: CPT

## 2025-02-14 ENCOUNTER — HOSPITAL ENCOUNTER (OUTPATIENT)
Dept: MRI IMAGING | Facility: HOSPITAL | Age: 50
End: 2025-02-14
Payer: COMMERCIAL

## 2025-02-14 DIAGNOSIS — R29.898 WEAKNESS OF BOTH ARMS: ICD-10-CM

## 2025-02-14 DIAGNOSIS — R20.2 NUMBNESS AND TINGLING: ICD-10-CM

## 2025-02-14 DIAGNOSIS — R20.0 NUMBNESS AND TINGLING: ICD-10-CM

## 2025-02-14 PROCEDURE — 72156 MRI NECK SPINE W/O & W/DYE: CPT

## 2025-02-14 PROCEDURE — A9585 GADOBUTROL INJECTION: HCPCS | Performed by: PHYSICIAN ASSISTANT

## 2025-02-14 RX ORDER — GADOBUTROL 604.72 MG/ML
7 INJECTION INTRAVENOUS
Status: COMPLETED | OUTPATIENT
Start: 2025-02-14 | End: 2025-02-14

## 2025-02-14 RX ADMIN — GADOBUTROL 7 ML: 604.72 INJECTION INTRAVENOUS at 17:52

## 2025-02-18 ENCOUNTER — RESULTS FOLLOW-UP (OUTPATIENT)
Dept: NEUROLOGY | Facility: CLINIC | Age: 50
End: 2025-02-18

## 2025-02-21 LAB
APOB+LDLR+PCSK9 GENE MUT ANL BLD/T: NOT DETECTED
BRCA1+BRCA2 DEL+DUP + FULL MUT ANL BLD/T: NOT DETECTED
MLH1+MSH2+MSH6+PMS2 GN DEL+DUP+FUL M: NOT DETECTED

## 2025-03-03 ENCOUNTER — HOSPITAL ENCOUNTER (OUTPATIENT)
Dept: RADIOLOGY | Facility: CLINIC | Age: 50
Discharge: HOME | End: 2025-03-03
Payer: COMMERCIAL

## 2025-03-03 VITALS — WEIGHT: 160 LBS | HEIGHT: 65 IN | BODY MASS INDEX: 26.66 KG/M2

## 2025-03-03 DIAGNOSIS — R92.8 ABNORMAL SCREENING MAMMOGRAM: ICD-10-CM

## 2025-03-03 PROCEDURE — G0279 TOMOSYNTHESIS, MAMMO: HCPCS

## 2025-03-03 PROCEDURE — 77065 DX MAMMO INCL CAD UNI: CPT

## 2025-03-03 PROCEDURE — 76642 ULTRASOUND BREAST LIMITED: CPT

## 2025-03-20 ENCOUNTER — PROCEDURE VISIT (OUTPATIENT)
Dept: NEUROLOGY | Facility: CLINIC | Age: 50
End: 2025-03-20
Payer: COMMERCIAL

## 2025-03-20 VITALS — HEART RATE: 72 BPM | SYSTOLIC BLOOD PRESSURE: 116 MMHG | DIASTOLIC BLOOD PRESSURE: 88 MMHG | TEMPERATURE: 98 F

## 2025-03-20 DIAGNOSIS — G43.709 CHRONIC MIGRAINE WITHOUT AURA WITHOUT STATUS MIGRAINOSUS, NOT INTRACTABLE: Primary | ICD-10-CM

## 2025-03-20 PROCEDURE — 64615 CHEMODENERV MUSC MIGRAINE: CPT | Performed by: PHYSICIAN ASSISTANT

## 2025-03-20 NOTE — PROGRESS NOTES
"Universal Protocol   procedure performed by consultantConsent: Verbal consent obtained. Written consent obtained.  Risks and benefits: risks, benefits and alternatives were discussed  Consent given by: patient  Time out: Immediately prior to procedure a \"time out\" was called to verify the correct patient, procedure, equipment, support staff and site/side marked as required.  Patient understanding: patient states understanding of the procedure being performed  Patient consent: the patient's understanding of the procedure matches consent given  Procedure consent: procedure consent matches procedure scheduled  Relevant documents: relevant documents present and verified  Patient identity confirmed: verbally with patient      Chemodenervation     Date/Time  3/20/2025 1:00 PM     Performed by  Delphine Herrera PA-C   Authorized by  Delphine Herrera PA-C     Pre-procedure details      Preparation: Patient was prepped and draped in usual sterile fashion     Anesthesia  (see MAR for exact dosages):     Anesthesia method:  None   Procedure details      Position:  Upright   Botox      Botox Type:  Type A    Brand:  Botox    mL's of Botulinum Toxin:  300    mL's of preservative free sterile saline:  6    Final Concentration per CC:  50 units    Needle Gauge:  30 G 2.5 inch   Procedures      Botox Procedures: chronic headache     Injection Location      Head / Face:  L superior cervical paraspinal, R superior cervical paraspinal, L , R , R frontalis, L frontalis, R medial occipitalis, L medial occipitalis, procerus, R temporalis, L superior trapezius, R superior trapezius and L temporalis    L  injection amount:  5 unit(s)    R  injection amount:  5 unit(s)    L lateral frontalis:  5 unit(s)    R lateral frontalis:  5 unit(s)    L medial frontalis:  5 unit(s)    R medial frontalis:  5 unit(s)    L temporalis injection amount:  20 unit(s)    R temporalis injection amount:  20 unit(s)    Procerus " injection amount:  5 unit(s)    L medial occipitalis injection amount:  15 unit(s)    R medial occipitalis injection amount:  15 unit(s)    L superior cervical paraspinal injection amount:  10 unit(s)    R superior cervical paraspinal injection amount:  10 unit(s)    L superior trapezius injection amount:  15 unit(s)    R superior trapezius injection amount:  15 unit(s)   Total Units      Total units used:  300   Post-procedure details      Chemodenervation:  Chronic migraine    Facial Nerve Location::  Bilateral facial nerve    Patient tolerance of procedure:  Tolerated well, no immediate complications   Comments       5 units orbicularis oculi bilaterally  20 units trapezius  5 units SCM bilaterally  5 units splenius capitis bilaterally  5 units semisplenalis capitis bilaterally  25 units frontalis  5 units levator scapulae bilaterally  5 units temporalis bilaterally  30 units left apex  5 units suboccipitalis bilaterally    All medically necessary

## 2025-05-14 ENCOUNTER — TELEPHONE (OUTPATIENT)
Age: 50
End: 2025-05-14

## 2025-05-14 NOTE — TELEPHONE ENCOUNTER
Spoke with pt because provider schedule change, pt was schedule on 08/29 and schedule on 09/03 with Abdoul.  Pt agreeable.

## 2025-05-22 NOTE — TELEPHONE ENCOUNTER
Patient is scheduled with Pastor Anguiano on 8/20/2019 in the [facility] location  Patient presents today again with questionable increasing size size of the lymph nodes does have a patent airway but feels it harder to swallow.  I did obtain a repeat CT scan which was unchanged will treat with a course of prednisone tapering dose look like she could have a questionable cellulitis forming on the side of the right tonsil treated with a course of antibiotics.    I did obtain liver enzymes with resulted back this afternoon with extremely elevated ALT's contacted and consulted ED for determination of further evaluation they recommend having patient reevaluated as cholecystitis can be a complication of mono and patient is not improving from her mono recommend patient be seen in the emergency room to repeat labs as well as do a questionable ultrasound of the gallbladder to ensure no other inflammation is present with the elevated liver enzymes and it is directly related to the mono patient was aware she will proceed to the emergency room.    {PROVIDER CHARTING PREFERENCE:410816}    Peyman Maciel is a 20 year old, presenting for the following health issues:  Mono        5/22/2025     9:08 AM   Additional Questions   Roomed by Crystal BUCHANAN([facility])         5/22/2025   Forms   Any forms needing to be completed Yes     History of Present Illness       Reason for visit:  Follow up ED   She is taking medications regularly.          ED/UC Followup:    Facility:  [facility]  Date of visit: 5/19/25  Reason for visit: Mononucleosis syndrome + Cervical lymphadenopathy  Current Status: Not feeling good - sore throat, feels like is a gigant lump on the back of her throat, hard to breath, hard to swollen, hard to move neck and tongue, is all on the right side - feeling tired  with lack of energy - has been drinking water/gatorate to keep hydration.         Acute Illness  Acute illness concerns: Was seen on UC on 5/14/25 - but had a bad headache on Tuesday 5/13/25 and that is why went to the  - and was told had  "Mono - since then has not feeling well -   Onset/Duration: 5/13/25  Symptoms:  Fever: YES- off and on   Chills/Sweats: YES- off and on  Headache (location?): YES  Sinus Pressure: YES- a little   Conjunctivitis:  No  Ear Pain: no  Rhinorrhea: No  Congestion: No  Sore Throat: YES  Cough: no  Wheeze: YES- sometimes  Decreased Appetite: YES  Nausea: YES- sometimes  Vomiting: YES- from migraine on the first 2 days   Diarrhea: No  Dysuria/Freq.: No  Dysuria or Hematuria: No  Fatigue/Achiness: YES  Sick/Strep Exposure: No  Therapies tried and outcome: None  {additonal problems for provider to add (Optional):962211}    {ROS Picklists (Optional):595540}      Objective    /68   Pulse 87   Temp 97.8  F (36.6  C) (Tympanic)   Resp 16   Ht 1.702 m (5' 7.01\")   Wt 83 kg (183 lb)   LMP 04/16/2025 (Approximate)   SpO2 99%   BMI 28.66 kg/m    Body mass index is 28.66 kg/m .  Physical Exam   {Exam List (Optional):862795}    {Diagnostic Test Results (Optional):539828}        Signed Electronically by: TAYLOR Holley CNP  {Email feedback regarding this note to [email or web address]   :[id number]}  "   GENERAL: alert and no distress  EYES: Eyes grossly normal to inspection, PERRL and conjunctivae and sclerae normal  HENT: ear canals and TM's normal, nose and mouth without ulcers or lesions  NECK: no adenopathy, no asymmetry, masses, or scars  NECK: cervical adenopathy   RESP: lungs clear to auscultation - no rales, rhonchi or wheezes  CV: regular rate and rhythm, normal S1 S2, no S3 or S4, no murmur, click or rub, no peripheral edema  ABDOMEN: soft, nontender, no hepatosplenomegaly, no masses and bowel sounds normal  MS: no gross musculoskeletal defects noted, no edema  SKIN: no suspicious lesions or rashes  NEURO: Normal strength and tone, mentation intact and speech normal  PSYCH: mentation appears normal, affect normal/bright    Results for orders placed or performed during the hospital encounter of 05/22/25   US Abdomen Limited     Status: None    Narrative    EXAM: US ABDOMEN LIMITED  LOCATION: [facility]  DATE: 5/22/2025    INDICATION: Elevated liver enzymes in the setting of mono  COMPARISON: None.  TECHNIQUE: Limited abdominal ultrasound.    FINDINGS:    GALLBLADDER: No gallstones, wall thickening, or pericholecystic fluid. Negative sonographic Gutierrez's sign.    BILE DUCTS: No intrahepatic biliary dilatation. The common duct measures 3 mm.    LIVER: Normal parenchyma with smooth contour. The portal vein is patent with flow in the normal direction.    RIGHT KIDNEY: No hydronephrosis.    PANCREAS: The visualized portions are normal.    SPLEEN: Mildly enlarged, measuring up to 14 cm in greatest dimension. No focal lesions visualized.    No ascites.      Impression    IMPRESSION:  1.  Mild splenomegaly.  2.  Otherwise unremarkable limited abdominal ultrasound.       US Abdominal Doppler Complete     Status: None    Narrative    EXAM: US ABDOMEN OR PELVIS DOPPLER COMPLETE  LOCATION: [facility]  DATE: 5/22/2025    INDICATION: Please eval hepatic veins in  the setting of hepatitis  COMPARISON: Same day right upper quadrant ultrasound  TECHNIQUE: Color flow with spectral Doppler and waveform analysis performed.     FINDINGS:    Please see same day ultrasound for grayscale findings.    ABDOMINAL DUPLEX: The hepatic artery, hepatic veins, IVC, portal veins, and splenic vein are patent with flow in the normal direction.       Impression    IMPRESSION:  1.  Patent hepatic vasculature with proper directional flow.       Comprehensive metabolic panel     Status: Abnormal   Result Value Ref Range    Sodium 140 135 - 145 mmol/L    Potassium 3.9 3.4 - 5.3 mmol/L    Carbon Dioxide (CO2) 25 22 - 29 mmol/L    Anion Gap 13 7 - 15 mmol/L    Urea Nitrogen 12.4 6.0 - 20.0 mg/dL    Creatinine 1.00 (H) 0.51 - 0.95 mg/dL    GFR Estimate 83 >60 mL/min/1.73m2    Calcium 9.4 8.8 - 10.4 mg/dL    Chloride 102 98 - 107 mmol/L    Glucose 92 70 - 99 mg/dL    Alkaline Phosphatase 313 (H) 40 - 150 U/L     (H) 0 - 35 U/L     (HH) 0 - 50 U/L    Protein Total 7.5 6.4 - 8.3 g/dL    Albumin 4.2 3.5 - 5.2 g/dL    Bilirubin Total 0.6 <=1.2 mg/dL   UA with Microscopic reflex to Culture     Status: Abnormal    Specimen: Urine, Midstream   Result Value Ref Range    Color Urine Straw Colorless, Straw, Light Yellow, Yellow    Appearance Urine Clear Clear    Glucose Urine Negative Negative mg/dL    Bilirubin Urine Negative Negative    Ketones Urine Negative Negative mg/dL    Specific Gravity Urine 1.017 1.003 - 1.035    Blood Urine Negative Negative    pH Urine 6.5 5.0 - 7.0    Protein Albumin Urine Negative Negative mg/dL    Urobilinogen Urine Normal Normal mg/dL    Nitrite Urine Negative Negative    Leukocyte Esterase Urine Negative Negative    RBC Urine <1 <=2 /HPF    WBC Urine 1 <=5 /HPF    Squamous Epithelials Urine 4 (H) <=1 /HPF    Narrative    Urine Culture not indicated   HCG qualitative urine     Status: Normal   Result Value Ref Range    hCG Urine Qualitative Negative Negative   CBC  with platelets and differential     Status: Abnormal   Result Value Ref Range    WBC Count 11.9 (H) 4.0 - 11.0 10e3/uL    RBC Count 4.57 3.80 - 5.20 10e6/uL    Hemoglobin 12.8 11.7 - 15.7 g/dL    Hematocrit 39.4 35.0 - 47.0 %    MCV 86 78 - 100 fL    MCH 28.0 26.5 - 33.0 pg    MCHC 32.5 31.5 - 36.5 g/dL    RDW 14.8 10.0 - 15.0 %    Platelet Count 280 150 - 450 10e3/uL    % Neutrophils 25 %    % Lymphocytes 66 %    % Monocytes 7 %    % Eosinophils 1 %    % Basophils 0 %    % Immature Granulocytes 1 %    NRBCs per 100 WBC 0 <1 /100    Absolute Neutrophils 3.0 1.6 - 8.3 10e3/uL    Absolute Lymphocytes 7.9 (H) 0.8 - 5.3 10e3/uL    Absolute Monocytes 0.8 0.0 - 1.3 10e3/uL    Absolute Eosinophils 0.1 0.0 - 0.7 10e3/uL    Absolute Basophils 0.0 0.0 - 0.2 10e3/uL    Absolute Immature Granulocytes 0.1 <=0.4 10e3/uL    Absolute NRBCs 0.0 10e3/uL   RBC and Platelet Morphology     Status: None   Result Value Ref Range    RBC Morphology Confirmed RBC Indices     Platelet Assessment  Automated Count Confirmed. Platelet morphology is normal.     Automated Count Confirmed. Platelet morphology is normal.   Hepatitis B core antibody IgM     Status: Normal   Result Value Ref Range    Hepatitis B Core Antibody IgM Nonreactive Nonreactive   Hepatitis B Surface Antibody     Status: None   Result Value Ref Range    Hepatitis B Surface Antibody Nonreactive     Hepatitis B Surface Antibody Instrument Value 4.98 <8.5 m[IU]/mL   Hepatitis B surface antigen     Status: Normal   Result Value Ref Range    Hepatitis B Surface Antigen Nonreactive Nonreactive   INR     Status: Normal   Result Value Ref Range    INR 1.00 0.85 - 1.15    PT 13.1 11.8 - 14.8 Seconds   Acetaminophen level     Status: Abnormal   Result Value Ref Range    Acetaminophen <5.0 (L) 10.0 - 30.0 ug/mL   CBC with platelets, differential     Status: Abnormal    Narrative    The following orders were created for panel order CBC with platelets, differential.  Procedure                                Abnormality         Status                     ---------                               -----------         ------                     CBC with platelets and ...[[id number]]  Abnormal            Final result               RBC and Platelet Morpho...[[id number]]                      Final result                 Please view results for these tests on the individual orders.   Results for orders placed or performed in visit on 05/22/25   CBC with platelets     Status: Abnormal   Result Value Ref Range    WBC Count 11.4 (H) 4.0 - 11.0 10e3/uL    RBC Count 4.69 3.80 - 5.20 10e6/uL    Hemoglobin 13.1 11.7 - 15.7 g/dL    Hematocrit 40.6 35.0 - 47.0 %    MCV 87 78 - 100 fL    MCH 27.9 26.5 - 33.0 pg    MCHC 32.3 31.5 - 36.5 g/dL    RDW 14.6 10.0 - 15.0 %    Platelet Count 264 150 - 450 10e3/uL   Comprehensive metabolic panel (BMP + Alb, Alk Phos, ALT, AST, Total. Bili, TP)     Status: Abnormal   Result Value Ref Range    Sodium 141 135 - 145 mmol/L    Potassium 4.4 3.4 - 5.3 mmol/L    Carbon Dioxide (CO2) 23 22 - 29 mmol/L    Anion Gap 14 7 - 15 mmol/L    Urea Nitrogen 13.3 6.0 - 20.0 mg/dL    Creatinine 0.90 0.51 - 0.95 mg/dL    GFR Estimate >90 >60 mL/min/1.73m2    Calcium 9.4 8.8 - 10.4 mg/dL    Chloride 104 98 - 107 mmol/L    Glucose 86 70 - 99 mg/dL    Alkaline Phosphatase 322 (H) 40 - 150 U/L     (H) 0 - 35 U/L     (HH) 0 - 50 U/L    Protein Total 7.5 6.4 - 8.3 g/dL    Albumin 4.1 3.5 - 5.2 g/dL    Bilirubin Total 0.6 <=1.2 mg/dL   Hepatitis A Antibody Total     Status: None   Result Value Ref Range    Hepatitis A Antibody Total Reactive     Narrative    HAV antibody testing interpretation chart:      HAV Total Antibody - NONREACTIVE  HAV IgM - NOT TESTED  Comments: No evidence of vaccination or previous infection; Susceptible to Hepatitis A infection     HAV Total Antibody - REACTIVE   HAV IgM - NOT TESTED  Comments:Consistent with recent or remote Hepatitis A infection or antibody  response to HAV vaccination    HAV Total Antibody - REACTIVE  HAV IgM - NONREACTIVE  Comments:Consistent with resolved Hepatitis A infection or antibody response to HAV vaccination    HAV Total Antibody - REACTIVE  HAV IgM - REACTIVE  Comments:Consistent with active Hepatitis A infection   Results for orders placed or performed during the hospital encounter of 05/22/25   CT Soft Tissue Neck w Contrast     Status: None    Narrative    EXAM: CT SOFT TISSUE NECK W CONTRAST  LOCATION: [facility]  DATE: 5/22/2025    INDICATION:  Cervical lymphadenitis. Mononucleosis.  COMPARISON: Neck CT 05/15/2025.  CONTRAST: 80 mL Isovue 370  TECHNIQUE: Routine CT Soft Tissue Neck with IV contrast. Multiplanar reformats. Dose reduction techniques were used.    FINDINGS:     MUCOSAL SPACES/SOFT TISSUES: Redemonstrated mild enlargement of the adenoid tissue and bilateral palatine tonsils, similar to prior. No evidence of significant airway narrowing. Normal vocal cords and infraglottic trachea. Normal parapharyngeal space and   subcutaneous soft tissues. Normal oral cavity,  spaces, and floor of mouth structures.    LYMPH NODES: Redemonstrated multiple enhancing bilateral level 2, 3, and 5 cervical chain lymph nodes with enlargement of bilateral level 2 lymph nodes, similar to the prior study.     SALIVARY GLANDS: Normal parotid and submandibular glands.    THYROID: Normal.     VESSELS: Vascular structures of the neck are patent.    VISUALIZED INTRACRANIAL/ORBITS/SINUSES: No abnormality of the visualized intracranial compartment or orbits. Left maxillary sinus mucus retention cyst. No significant paranasal sinus mucosal thickening.    OTHER: No destructive osseous lesion. The included lung apices are clear.      Impression    IMPRESSION:   1.  No significant change compared to the prior neck CT from 05/15/2025.  2.  Redemonstrated bilateral cervical chain lymphadenopathy and enlargement of the  adenoid tissue and bilateral palatine tonsils. Findings are most consistent with patient's diagnosis of mononucleosis. No evidence of airway compromise.           Signed Electronically by: TAYLOR Holley CNP

## 2025-06-19 ENCOUNTER — PROCEDURE VISIT (OUTPATIENT)
Dept: NEUROLOGY | Facility: CLINIC | Age: 50
End: 2025-06-19
Payer: COMMERCIAL

## 2025-06-19 VITALS — HEART RATE: 87 BPM | SYSTOLIC BLOOD PRESSURE: 128 MMHG | DIASTOLIC BLOOD PRESSURE: 84 MMHG | TEMPERATURE: 98 F

## 2025-06-19 DIAGNOSIS — G43.709 CHRONIC MIGRAINE WITHOUT AURA WITHOUT STATUS MIGRAINOSUS, NOT INTRACTABLE: Primary | ICD-10-CM

## 2025-06-19 PROCEDURE — 64615 CHEMODENERV MUSC MIGRAINE: CPT | Performed by: PHYSICIAN ASSISTANT

## 2025-06-19 NOTE — PROGRESS NOTES
"Universal Protocol   procedure performed by consultantConsent: Verbal consent obtained. Written consent obtained  Risks and benefits: risks, benefits and alternatives were discussed  Consent given by: patient  Time out: Immediately prior to procedure a \"time out\" was called to verify the correct patient, procedure, equipment, support staff and site/side marked as required.  Patient understanding: patient states understanding of the procedure being performed  Patient consent: the patient's understanding of the procedure matches consent given  Procedure consent: procedure consent matches procedure scheduled  Relevant documents: relevant documents present and verified  Patient identity confirmed: verbally with patient      Chemodenervation     Date/Time  6/19/2025 8:30 AM     Performed by  Delphine Herrera PA-C   Authorized by  Delphine Herrera PA-C     Pre-procedure details      Preparation: Patient was prepped and draped in usual sterile fashion     Anesthesia  (see MAR for exact dosages):     Anesthesia method:  None   Procedure details      Position:  Upright   Botox      Botox Type:  Type A    Brand:  Botox    mL's of Botulinum Toxin:  300    mL's of preservative free sterile saline:  6    Final Concentration per CC:  50 units    Needle Gauge:  30 G 2.5 inch   Procedures      Botox Procedures: chronic headache     Injection Location      Head / Face:  L superior cervical paraspinal, R superior cervical paraspinal, L , R , R frontalis, L frontalis, R medial occipitalis, L medial occipitalis, procerus, R temporalis, L superior trapezius, R superior trapezius and L temporalis    L  injection amount:  5 unit(s)    R  injection amount:  5 unit(s)    L lateral frontalis:  5 unit(s)    R lateral frontalis:  5 unit(s)    L medial frontalis:  5 unit(s)    R medial frontalis:  5 unit(s)    L temporalis injection amount:  20 unit(s)    R temporalis injection amount:  20 unit(s)    Procerus " injection amount:  5 unit(s)    L medial occipitalis injection amount:  15 unit(s)    R medial occipitalis injection amount:  15 unit(s)    L superior cervical paraspinal injection amount:  10 unit(s)    R superior cervical paraspinal injection amount:  10 unit(s)    L superior trapezius injection amount:  15 unit(s)    R superior trapezius injection amount:  15 unit(s)   Total Units      Total units used:  300   Post-procedure details      Chemodenervation:  Chronic migraine    Facial Nerve Location::  Bilateral facial nerve    Patient tolerance of procedure:  Tolerated well, no immediate complications   Comments       5 units orbicularis oculi bilaterally  20 units trapezius  5 units SCM bilaterally  5 units splenius capitis bilaterally  5 units semisplenalis capitis bilaterally  25 units frontalis  5 units levator scapulae bilaterally  5 units temporalis bilaterally  30 units left apex  5 units suboccipitalis bilaterally    All medically necessary

## 2025-08-13 ENCOUNTER — APPOINTMENT (OUTPATIENT)
Dept: LAB | Facility: CLINIC | Age: 50
End: 2025-08-13
Payer: COMMERCIAL

## 2025-08-13 DIAGNOSIS — K90.49 PROTEIN-LOSING ENTEROPATHY: ICD-10-CM

## 2025-08-13 DIAGNOSIS — Z00.8 HEALTH EXAMINATION IN POPULATION SURVEY: ICD-10-CM

## 2025-08-13 LAB
ALBUMIN SERPL BCG-MCNC: 4 G/DL (ref 3.5–5)
ALP SERPL-CCNC: 73 U/L (ref 34–104)
ALT SERPL W P-5'-P-CCNC: 14 U/L (ref 7–52)
ANION GAP SERPL CALCULATED.3IONS-SCNC: 10 MMOL/L (ref 4–13)
AST SERPL W P-5'-P-CCNC: 18 U/L (ref 13–39)
BILIRUB SERPL-MCNC: 0.55 MG/DL (ref 0.2–1)
BUN SERPL-MCNC: 15 MG/DL (ref 5–25)
CALCIUM SERPL-MCNC: 9.5 MG/DL (ref 8.4–10.2)
CHLORIDE SERPL-SCNC: 103 MMOL/L (ref 96–108)
CHOLEST SERPL-MCNC: 225 MG/DL (ref ?–200)
CO2 SERPL-SCNC: 26 MMOL/L (ref 21–32)
CREAT SERPL-MCNC: 0.75 MG/DL (ref 0.6–1.3)
ERYTHROCYTE [DISTWIDTH] IN BLOOD BY AUTOMATED COUNT: 13.2 % (ref 11.6–15.1)
EST. AVERAGE GLUCOSE BLD GHB EST-MCNC: 108 MG/DL
GFR SERPL CREATININE-BSD FRML MDRD: 93 ML/MIN/1.73SQ M
GLUCOSE P FAST SERPL-MCNC: 76 MG/DL (ref 65–99)
HBA1C MFR BLD: 5.4 %
HCT VFR BLD AUTO: 38.5 % (ref 34.8–46.1)
HDLC SERPL-MCNC: 56 MG/DL
HGB BLD-MCNC: 12.5 G/DL (ref 11.5–15.4)
LDLC SERPL CALC-MCNC: 146 MG/DL (ref 0–100)
MAGNESIUM SERPL-MCNC: 2.1 MG/DL (ref 1.9–2.7)
MCH RBC QN AUTO: 29.5 PG (ref 26.8–34.3)
MCHC RBC AUTO-ENTMCNC: 32.5 G/DL (ref 31.4–37.4)
MCV RBC AUTO: 91 FL (ref 82–98)
NONHDLC SERPL-MCNC: 169 MG/DL
PLATELET # BLD AUTO: 337 THOUSANDS/UL (ref 149–390)
PMV BLD AUTO: 11.3 FL (ref 8.9–12.7)
POTASSIUM SERPL-SCNC: 4.7 MMOL/L (ref 3.5–5.3)
PROT SERPL-MCNC: 6.7 G/DL (ref 6.4–8.4)
RBC # BLD AUTO: 4.24 MILLION/UL (ref 3.81–5.12)
SODIUM SERPL-SCNC: 139 MMOL/L (ref 135–147)
TRIGL SERPL-MCNC: 117 MG/DL (ref ?–150)
TSH SERPL DL<=0.05 MIU/L-ACNC: 3.86 UIU/ML (ref 0.45–4.5)
VIT B12 SERPL-MCNC: 171 PG/ML (ref 180–914)
WBC # BLD AUTO: 7.65 THOUSAND/UL (ref 4.31–10.16)

## 2025-08-13 PROCEDURE — 83036 HEMOGLOBIN GLYCOSYLATED A1C: CPT

## 2025-08-13 PROCEDURE — 83735 ASSAY OF MAGNESIUM: CPT

## 2025-08-13 PROCEDURE — 84443 ASSAY THYROID STIM HORMONE: CPT

## 2025-08-13 PROCEDURE — 85027 COMPLETE CBC AUTOMATED: CPT

## 2025-08-13 PROCEDURE — 82607 VITAMIN B-12: CPT

## 2025-08-13 PROCEDURE — 36415 COLL VENOUS BLD VENIPUNCTURE: CPT

## 2025-08-13 PROCEDURE — 80053 COMPREHEN METABOLIC PANEL: CPT

## 2025-08-13 PROCEDURE — 80061 LIPID PANEL: CPT

## 2025-08-15 ENCOUNTER — TELEPHONE (OUTPATIENT)
Dept: NEUROLOGY | Facility: CLINIC | Age: 50
End: 2025-08-15

## 2025-08-19 ENCOUNTER — TELEPHONE (OUTPATIENT)
Dept: NEUROLOGY | Facility: CLINIC | Age: 50
End: 2025-08-19